# Patient Record
Sex: FEMALE | Race: WHITE | Employment: OTHER | URBAN - METROPOLITAN AREA
[De-identification: names, ages, dates, MRNs, and addresses within clinical notes are randomized per-mention and may not be internally consistent; named-entity substitution may affect disease eponyms.]

---

## 2017-01-18 ENCOUNTER — RADIANT APPOINTMENT (OUTPATIENT)
Dept: GENERAL RADIOLOGY | Facility: CLINIC | Age: 67
End: 2017-01-18
Attending: INTERNAL MEDICINE
Payer: COMMERCIAL

## 2017-01-18 ENCOUNTER — OFFICE VISIT (OUTPATIENT)
Dept: INTERNAL MEDICINE | Facility: CLINIC | Age: 67
End: 2017-01-18
Payer: COMMERCIAL

## 2017-01-18 VITALS
TEMPERATURE: 97 F | HEIGHT: 65 IN | HEART RATE: 90 BPM | SYSTOLIC BLOOD PRESSURE: 108 MMHG | OXYGEN SATURATION: 95 % | BODY MASS INDEX: 22.63 KG/M2 | DIASTOLIC BLOOD PRESSURE: 70 MMHG | WEIGHT: 135.8 LBS

## 2017-01-18 DIAGNOSIS — M25.531 RIGHT WRIST PAIN: ICD-10-CM

## 2017-01-18 DIAGNOSIS — D17.30 LIPOMA OF SKIN AND SUBCUTANEOUS TISSUE: Primary | ICD-10-CM

## 2017-01-18 DIAGNOSIS — M65.4 RADIAL STYLOID TENOSYNOVITIS: ICD-10-CM

## 2017-01-18 PROCEDURE — 99213 OFFICE O/P EST LOW 20 MIN: CPT | Performed by: INTERNAL MEDICINE

## 2017-01-18 PROCEDURE — 73110 X-RAY EXAM OF WRIST: CPT | Mod: RT

## 2017-01-18 NOTE — PROGRESS NOTES
SUBJECTIVE:                                                    Nita Trevizo is a 66 year old female who presents to clinic today for the following health issues:    Pt is a 66 year old female who is seen here to day with the complaints of having the pain in her right wrist on and off since  2 years  and the pain has increased in the last 2 months. Patient states that it has been difficult for her to lift things, open the jars with her right hand,  pt is left hand dominant,  Patient has had EMG in her right hand about 2 years ago and the results were inconclusive. Patient does complain of occasional tingling or numbness in the right fourth and fifth fingers.   Patient also complains of a lump in right wrist since a few years. No pain in the lump has not grown in size.      Patient Active Problem List   Diagnosis     Chronic maxillary sinusitis     Esophageal reflux     Osteopenia     CARDIOVASCULAR SCREENING; LDL GOAL LESS THAN 130     Dry eyes, bilateral     Past Surgical History   Procedure Laterality Date     C total abdom hysterectomy       for fibroids     Hc remove tonsils/adenoids,<12 y/o       C removal gallbladder       C ligate fallopian tube       C nonspecific procedure       vein stripping x 3     C appendectomy         Social History   Substance Use Topics     Smoking status: Never Smoker      Smokeless tobacco: Never Used     Alcohol Use: 0.0 oz/week     0 Standard drinks or equivalent per week     Family History   Problem Relation Age of Onset     DIABETES Mother      born 1928     Hypertension Mother      CANCER Paternal Grandfather      skin     Colon Cancer Maternal Grandfather          Current Outpatient Prescriptions   Medication Sig Dispense Refill     triamcinolone (KENALOG) 0.1 % cream Apply to affected area on neck bid for 7-10 days.Not to be used more then 10-14 consecutive days, not to be used on face or groin 30 g 0     polyethylene glycol 0.4%- propylene glycol 0.3% (SYSTANE ULTRA  "PF) 0.4-0.3 % SOLN ophthalmic solution Place 1 drop into both eyes 3 times daily as needed for dry eyes       estradiol 0.0375 MG/24HR PTWK Place 1 patch onto the skin once a week 12 patch 3     nitroglycerin (NITROSTAT) 0.4 MG SL tablet Place 1 tablet (0.4 mg) under the tongue every 5 minutes as needed for chest pain If you are still having symptoms after 3 doses (15 minutes) call 911. 25 tablet 0     NAPROXEN 500 MG OR TBEC 1 TABLET TWICE DAILY prn 30 3     IBUPROFEN 200 MG OR TABS 2 TABLET EVERY 4 TO 6 HOURS AS NEEDED for headaches          ROS:  C: NEGATIVE for fever, chills, change in weight  MUSCULOSKELETAL: rt wrist pain   NEURO: occ tingling /numbness Rt 4th/5th fingers.       OBJECTIVE:                                                    /70 mmHg  Pulse 90  Temp(Src) 97  F (36.1  C) (Oral)  Ht 5' 5\" (1.651 m)  Wt 135 lb 12.8 oz (61.598 kg)  BMI 22.60 kg/m2  SpO2 95%  Breastfeeding? No  Body mass index is 22.6 kg/(m^2).   GENERAL: healthy, alert, well nourished, well hydrated, no distress  MS:  Rt wrist- no swelling, no erythema, has tenderness at the base of rt thumb , also noted soft lump/lipoma on rt wrist on ulnar side.  NEURO: strength and tone- normal, sensory exam- grossly normal, mentation- intact, speech- normal, reflexes- symmetric, Tinel's and Phalen.s negative       ASSESSMENT/PLAN:                                                      (M65.4) Radial styloid tenosynovitis  Comment: explained about the condition  Plan:   DE QUERVAIN TENOSYNOVITIS-explained clearly about the condition.Apply ice,restrict thumb gripping and grasping.Try OTC Ibuprofen as directed,explined clearly about medication,insructions and side effects.will get x ray of the rt hand to r/o any arthritis. Also referred to hand specialist.. ORTHO  REFERRAL       (D17.30) Lipoma of skin and subcutaneous tissue    Plan: reassured, will be seeing hand specialist.         Andrae Burns MD  [place] " [facility]

## 2017-01-18 NOTE — NURSING NOTE
"Chief Complaint   Patient presents with     Wrist Pain     right wrist pain-had EMG done in past on that wrist       Initial /70 mmHg  Pulse 90  Temp(Src) 97  F (36.1  C) (Oral)  Ht 5' 5\" (1.651 m)  Wt 135 lb 12.8 oz (61.598 kg)  BMI 22.60 kg/m2  SpO2 95%  Breastfeeding? No Estimated body mass index is 22.6 kg/(m^2) as calculated from the following:    Height as of this encounter: 5' 5\" (1.651 m).    Weight as of this encounter: 135 lb 12.8 oz (61.598 kg).  BP completed using cuff size: regular  Toya Flynn CMA      "

## 2017-01-20 ENCOUNTER — OFFICE VISIT (OUTPATIENT)
Dept: ORTHOPEDICS | Facility: CLINIC | Age: 67
End: 2017-01-20
Payer: COMMERCIAL

## 2017-01-20 VITALS
WEIGHT: 135 LBS | DIASTOLIC BLOOD PRESSURE: 64 MMHG | SYSTOLIC BLOOD PRESSURE: 112 MMHG | HEIGHT: 65 IN | BODY MASS INDEX: 22.49 KG/M2

## 2017-01-20 DIAGNOSIS — M19.031 ARTHRITIS OF RIGHT WRIST: Primary | ICD-10-CM

## 2017-01-20 PROCEDURE — 99203 OFFICE O/P NEW LOW 30 MIN: CPT | Performed by: ORTHOPAEDIC SURGERY

## 2017-01-20 NOTE — NURSING NOTE
"Chief Complaint   Patient presents with     Wrist Pain     right wrist, radial styloid tenosynovitis       Initial /64 mmHg  Ht 5' 5\" (1.651 m)  Wt 135 lb (61.236 kg)  BMI 22.47 kg/m2 Estimated body mass index is 22.47 kg/(m^2) as calculated from the following:    Height as of this encounter: 5' 5\" (1.651 m).    Weight as of this encounter: 135 lb (61.236 kg).  BP completed using cuff size: small regular    "

## 2017-01-20 NOTE — MR AVS SNAPSHOT
After Visit Summary   1/20/2017    Nita Trevizo    MRN: [id number]           Patient Information     Date Of Birth          1950        Visit Information        Provider Department      1/20/2017 10:40 AM Rashid Toth MD [facility] ORTHOPEDIC SURGERY        Today's Diagnoses     Arthritis of right wrist    -  1        Follow-ups after your visit        Additional Services     ORTHO  REFERRAL       [facility] is referring you to the Orthopedic  Services at [facility].       The  Representative will assist you in the coordination of your Orthopedic and Musculoskeletal Care as prescribed by your physician.    The  Representative will call you within 1 business day to help schedule your appointment, or you may contact the  Representative at:    All areas ~ [phone]     Type of Referral : Non Surgical - Dr Sparks or Dr Zhong - US guided right distal radial ulnar joint injection for arthritis      Timeframe requested: Within 2 weeks    Coverage of these services is subject to the terms and limitations of your health insurance plan.  Please call member services at your health plan with any benefit or coverage questions.      If X-rays, CT or MRI's have been performed, please contact the facility where they were done to arrange for , prior to your scheduled appointment.  Please bring this referral request to your appointment and present it to your specialist.                  Who to contact     If you have questions or need follow up information about today's clinic visit or your schedule please contact [facility] ORTHOPEDIC SURGERY directly at [phone].  Normal or non-critical lab and imaging results will be communicated to you by MyChart, letter or phone within 4 business days after the clinic has received the results. If you do not hear from us within 7 days, please contact the  "clinic through [employer] or phone. If you have a critical or abnormal lab result, we will notify you by phone as soon as possible.  Submit refill requests through [employer] or call your pharmacy and they will forward the refill request to us. Please allow 3 business days for your refill to be completed.          Additional Information About Your Visit        [employer] Information     [employer] gives you secure access to your electronic health record. If you see a primary care provider, you can also send messages to your care team and make appointments. If you have questions, please call your primary care clinic.  If you do not have a primary care provider, please call [phone] and they will assist you.        Care EveryWhere ID     This is your Care EveryWhere ID. This could be used by other organizations to access your [place] medical records  [id number]        Your Vitals Were     Height BMI (Body Mass Index)                5' 5\" (1.651 m) 22.47 kg/m2           Blood Pressure from Last 3 Encounters:   01/20/17 112/64   01/18/17 108/70   10/12/16 90/50    Weight from Last 3 Encounters:   01/20/17 135 lb (61.236 kg)   01/18/17 135 lb 12.8 oz (61.598 kg)   10/12/16 137 lb (62.143 kg)              We Performed the Following     ORTHO  REFERRAL        Primary Care Provider Office Phone # Fax #    Evelynchevy SUSHMA Burns [phone]       [place] [address] [place] [address]        Thank you!     Thank you for choosing [facility] ORTHOPEDIC SURGERY  for your care. Our goal is always to provide you with excellent care. Hearing back from our patients is one way we can continue to improve our services. Please take a few minutes to complete the written survey that you may receive in the mail after your visit with us. Thank you!             Your Updated Medication List - Protect others around you: Learn how to safely use, store and throw away your medicines at " www.disposemymeds.org.          This list is accurate as of: 1/20/17 11:04 AM.  Always use your most recent med list.                   Brand Name Dispense Instructions for use    estradiol 0.0375 MG/24HR Ptwk     12 patch    Place 1 patch onto the skin once a week       ibuprofen 200 MG tablet    ADVIL/MOTRIN     2 TABLET EVERY 4 TO 6 HOURS AS NEEDED for headaches       naproxen 500 MG Tbec     30    1 TABLET TWICE DAILY prn       nitroglycerin 0.4 MG sublingual tablet    NITROSTAT    25 tablet    Place 1 tablet (0.4 mg) under the tongue every 5 minutes as needed for chest pain If you are still having symptoms after 3 doses (15 minutes) call 911.       SYSTANE ULTRA PF 0.4-0.3 % Soln ophthalmic solution   Generic drug:  polyethylene glycol 0.4%- propylene glycol 0.3%      Place 1 drop into both eyes 3 times daily as needed for dry eyes       triamcinolone 0.1 % cream    KENALOG    30 g    Apply to affected area on neck bid for 7-10 days.Not to be used more then 10-14 consecutive days, not to be used on face or groin

## 2017-01-20 NOTE — PROGRESS NOTES
HISTORY OF PRESENT ILLNESS:    Nita Trevizo is a 66 year old female who is seen in consultation at the request of Dr. Burns for right wrist pain.  Present symptoms: Pt states pain has been off and on for the last 2 years and has worsened over the last month.  Pt states pain is across the wrist, will get tingling in the 4th and 5th fingers, pain with turning door knobs and rotational movements cause pain. Pt states she will get pain through the palm of the hand, reports having a lipoma over the volar aspect of the wrist.  Treatments tried to this point: OTC Medication:  Ibuprofen (Advil)  Topical Treatments: Ice and wrist brace  Orthopedic PMH: no ortho surgeries    Past Medical History   Diagnosis Date     Disorder of bone and cartilage, unspecified      Esophageal reflux      Arthritis      Osteopenia 6/14     -1.5       Past Surgical History   Procedure Laterality Date     C total abdom hysterectomy       for fibroids     Hc remove tonsils/adenoids,<14 y/o       C removal gallbladder       C ligate fallopian tube       C nonspecific procedure       vein stripping x 3     C appendectomy         Family History   Problem Relation Age of Onset     DIABETES Mother      born 1928     Hypertension Mother      CANCER Paternal Grandfather      skin     Colon Cancer Maternal Grandfather        Social History     Social History     Marital Status:      Spouse Name: N/A     Number of Children: N/A     Years of Education: N/A     Occupational History     Not on file.     Social History Main Topics     Smoking status: Never Smoker      Smokeless tobacco: Never Used     Alcohol Use: 0.0 oz/week     0 Standard drinks or equivalent per week     Drug Use: No     Sexual Activity: No     Other Topics Concern     Not on file     Social History Narrative       Current Outpatient Prescriptions   Medication Sig Dispense Refill     triamcinolone (KENALOG) 0.1 % cream Apply to affected area on neck bid for 7-10 days.Not to be  "used more then 10-14 consecutive days, not to be used on face or groin 30 g 0     polyethylene glycol 0.4%- propylene glycol 0.3% (SYSTANE ULTRA PF) 0.4-0.3 % SOLN ophthalmic solution Place 1 drop into both eyes 3 times daily as needed for dry eyes       estradiol 0.0375 MG/24HR PTWK Place 1 patch onto the skin once a week 12 patch 3     nitroglycerin (NITROSTAT) 0.4 MG SL tablet Place 1 tablet (0.4 mg) under the tongue every 5 minutes as needed for chest pain If you are still having symptoms after 3 doses (15 minutes) call 911. 25 tablet 0     NAPROXEN 500 MG OR TBEC 1 TABLET TWICE DAILY prn 30 3     IBUPROFEN 200 MG OR TABS 2 TABLET EVERY 4 TO 6 HOURS AS NEEDED for headaches          Allergies   Allergen Reactions     Amides      Neosporin \"rash\"     Codeine      \"chest pain\"       REVIEW OF SYSTEMS:  CONSTITUTIONAL:  NEGATIVE for fever, chills, change in weight  INTEGUMENTARY/SKIN:  NEGATIVE for worrisome rashes, moles or lesions  EYES:  NEGATIVE for vision changes or irritation  ENT/MOUTH:  NEGATIVE for ear, mouth and throat problems  RESP:  NEGATIVE for significant cough or SOB  BREAST:  NEGATIVE for masses, tenderness or discharge  CV:  NEGATIVE for chest pain, palpitations or peripheral edema  GI:  NEGATIVE for nausea, abdominal pain, heartburn, or change in bowel habits  :  Negative   MUSCULOSKELETAL:  See HPI above  NEURO:  NEGATIVE for weakness, dizziness or paresthesias  ENDOCRINE:  NEGATIVE for temperature intolerance, skin/hair changes  HEME/ALLERGY/IMMUNE:  NEGATIVE for bleeding problems  PSYCHIATRIC:  NEGATIVE for changes in mood or affect      PHYSICAL EXAM:  /64 mmHg  Ht 5' 5\" (1.651 m)  Wt 135 lb (61.236 kg)  BMI 22.47 kg/m2  Body mass index is 22.47 kg/(m^2).   GENERAL APPEARANCE: healthy, alert and no distress   SKIN: no suspicious lesions or rashes  NEURO: Normal strength and tone, mentation intact and speech normal  VASCULAR: Good pulses, and capillary refill   LYMPH: no " lymphadenopathy   PSYCH:  mentation appears normal and affect normal/bright    MSK:  Distal radioulnar joint area tenderness and rotational pain. It is ligamentously stable however.     ASSESSMENT / PLAN: Distal radioulnar joint dysfunction. For both its diagnostic as well as therapeutic value I offered her an intra-articular corticosteroid injection.      Imaging Interpretation:         Manuel Toth MD  Department of Orthopedic Surgery        Disclaimer: This note consists of symbols derived from keyboarding, dictation and/or voice recognition software. As a result, there may be errors in the script that have gone undetected. Please consider this when interpreting information found in this chart.

## 2017-01-27 ENCOUNTER — OFFICE VISIT (OUTPATIENT)
Dept: ORTHOPEDICS | Facility: CLINIC | Age: 67
End: 2017-01-27
Payer: COMMERCIAL

## 2017-01-27 VITALS
WEIGHT: 135 LBS | DIASTOLIC BLOOD PRESSURE: 68 MMHG | SYSTOLIC BLOOD PRESSURE: 118 MMHG | HEIGHT: 65 IN | BODY MASS INDEX: 22.49 KG/M2

## 2017-01-27 DIAGNOSIS — M19.031 ARTHRITIS OF RIGHT WRIST: Primary | ICD-10-CM

## 2017-01-27 PROCEDURE — 20606 DRAIN/INJ JOINT/BURSA W/US: CPT | Mod: RT | Performed by: FAMILY MEDICINE

## 2017-01-27 RX ORDER — METHYLPREDNISOLONE ACETATE 40 MG/ML
40 INJECTION, SUSPENSION INTRA-ARTICULAR; INTRALESIONAL; INTRAMUSCULAR; SOFT TISSUE ONCE
Qty: 1 ML | Refills: 0 | OUTPATIENT
Start: 2017-01-27 | End: 2017-01-27

## 2017-01-27 NOTE — PROGRESS NOTES
ASSESSMENT & PLAN    ICD-10-CM    1. Arthritis of right wrist M19.90 HC ARTHROCENTESIS ASPIR&/INJ INTERM JT/BURS W/US     METHYLPREDNISOLONE 40 MG INJ     methylPREDNISolone acetate (DEPO-MEDROL) 40 MG/ML injection   Presents for DRUJ cortisone injection which was completed today.  No complications however given degree of arthritic changes she could only tolerate a total of 1 cc of the solution.    Follow up as directed by Dr. Toth. Call direct clinic number [phone] at any time with questions or concerns. Instructed to call the office if the condition evolves or worsens.    -----    SUBJECTIVE  Nita Trevizo is a/an 66 year old  left hand dominant female who is seen in consultation at the request of Dr. Toth for a DRUJ cortisone injection. The patient is seen by themselves.    Patient's past medical, surgical, social, and family histories were reviewed today and no changes are noted.    Right Distal Radioulnar Joint (DRUJ) Joint Corticosteroid Injection    Diagnoses (preoperative and postoperative): Right DRUJ osteoarthrosis    Current Procedure (include preoperative):  Sonographically guided right DRUJ corticosteroid injection    Current Indication (include preoperative):  Alleviation of pain    REFERRED BY: Dr. Toth    REASON FOR PROCEDURE: Dr. Toth has requested a sonographically guided right DRUJ corticosteroid injection to help with modulation of pain.     PATIENT EDUCATION: Ready to learn with no apparent learning barriers identified.  Learning preferences include listening. Explained diagnosis and treatment plan as well as treatment alternatives. Patient expressed understanding of the content.    Following denial of allergy and review of potential side effects and complications including but not necessarily limited to infection, bleeding, allergic reaction, post-injection flare, local tissue breakdown (including but not limited to potential for skin depigmentation and/or subcutaneous fat  atrophy), systemic effects of corticosteroids, elevation of blood glucose, injury to soft tissue and/or nerves and seizure, patient indicated their understanding and agreed to proceed. Written and signed consent was obtained and is scanned into the chart.    PROCEDURE: Prior to the procedure, the right wrist/hand region was examined with a hockey stick transducer to visualize the joint space and determine the approach for the procedure.    Procedure was carried out using sterile technique including ChloraPrep, a sterile transducer cover, and sterile transducer gel. A simple surgical tray was used.    PROCEDURAL PAUSE: Procedural pause conducted to verify correct patient identity, procedure to be performed, and as applicable, correct side/site, correct patient position, availability of implants, special equipment, or special requirements.     Patient position:  Seated    Transducer type:  Hockey-stick transducer    Approach:  Out of plane, distal to proximal    Local Anesthesia:  None    Aspirate:  None    Injectate: Sonographically guided 25-gauge, 1.5 inch needle was directly visualized entering down into the right DRUJ.  After confirming needle tip position a total volume of 1cc was injected of a solution containing 1 ml of 40 mg/ml Depo Medrol and 1 of 1% Lidocaine was injected and seen flowing into the DRUJ.    AFTERCARE:  Patient tolerated the procedure without complication. After a short observation period, patient was discharged under their own power and in excellent condition.     Patient noted to have 3/10 pain before completion of the procedure and 3/10 upon completion of the procedure.    Patient's conditions were thoroughly discussed during today's visit with greater than 50% of the visit spent counseling the patient with total time spent face-to-face with the patient being 20 minutes.    Arpan Zhong DO [facility] [place]

## 2017-01-27 NOTE — NURSING NOTE
"Chief Complaint   Patient presents with     Musculoskeletal Problem       Initial /68 mmHg  Ht 5' 5\" (1.651 m)  Wt 135 lb (61.236 kg)  BMI 22.47 kg/m2 Estimated body mass index is 22.47 kg/(m^2) as calculated from the following:    Height as of this encounter: 5' 5\" (1.651 m).    Weight as of this encounter: 135 lb (61.236 kg).  BP completed using cuff size: wm Paulino ATC    "

## 2017-01-27 NOTE — PATIENT INSTRUCTIONS
Thank you for allowing us to participate in your care today.  Please find below your visit diagnosis and the plan going forward.    1. Arthritis of right wrist      Steroid injection of the right wrist was performed today in clinic    - Would not soak in a hot tub, bath or swimming pool for 48 hours  - Ok to shower  - The lidocaine (what is giving you pain relief right now) will likely stop working in 1-2 hours.  You will then have pain again, similar to before you received the injection. The corticosteroid will not start working until approximately 1-2 weeks from now.  - Ice today and only do your normal amounts of activity    Follow up as needed (sooner if needed; call direct clinic number [[phone]] at any time with questions or concerns)    Arpan Zhong, DO [facility]

## 2017-02-02 ENCOUNTER — HOSPITAL ENCOUNTER (OUTPATIENT)
Dept: LAB | Facility: CLINIC | Age: 67
Discharge: HOME | End: 2017-02-02
Attending: ORTHOPAEDIC SURGERY | Admitting: ORTHOPAEDIC SURGERY
Payer: MEDICARE

## 2017-02-02 DIAGNOSIS — M25.539 WRIST PAIN: Primary | ICD-10-CM

## 2017-02-02 LAB
CRP SERPL-MCNC: <2.9 MG/L (ref 0–8)
ERYTHROCYTE [SEDIMENTATION RATE] IN BLOOD BY WESTERGREN METHOD: 10 MM/H (ref 0–30)

## 2017-02-02 PROCEDURE — 85652 RBC SED RATE AUTOMATED: CPT | Performed by: ORTHOPAEDIC SURGERY

## 2017-02-02 PROCEDURE — 86200 CCP ANTIBODY: CPT | Performed by: ORTHOPAEDIC SURGERY

## 2017-02-02 PROCEDURE — 36415 COLL VENOUS BLD VENIPUNCTURE: CPT | Performed by: ORTHOPAEDIC SURGERY

## 2017-02-02 PROCEDURE — 86140 C-REACTIVE PROTEIN: CPT | Performed by: ORTHOPAEDIC SURGERY

## 2017-02-02 PROCEDURE — 86038 ANTINUCLEAR ANTIBODIES: CPT | Performed by: ORTHOPAEDIC SURGERY

## 2017-02-02 PROCEDURE — 86431 RHEUMATOID FACTOR QUANT: CPT | Performed by: ORTHOPAEDIC SURGERY

## 2017-02-03 LAB
ANA SER QL IA: 2.4
CCP AB SER IA-ACNC: 1 U/ML
RHEUMATOID FACT SER NEPH-ACNC: <20 IU/ML (ref 0–20)

## 2017-02-28 ENCOUNTER — OFFICE VISIT (OUTPATIENT)
Dept: INTERNAL MEDICINE | Facility: CLINIC | Age: 67
End: 2017-02-28
Payer: COMMERCIAL

## 2017-02-28 VITALS
OXYGEN SATURATION: 99 % | RESPIRATION RATE: 16 BRPM | WEIGHT: 133.4 LBS | BODY MASS INDEX: 22.23 KG/M2 | HEIGHT: 65 IN | HEART RATE: 102 BPM | DIASTOLIC BLOOD PRESSURE: 66 MMHG | SYSTOLIC BLOOD PRESSURE: 108 MMHG | TEMPERATURE: 97.8 F

## 2017-02-28 DIAGNOSIS — Z01.818 PRE-OP EXAM: Primary | ICD-10-CM

## 2017-02-28 LAB
ANION GAP SERPL CALCULATED.3IONS-SCNC: 9 MMOL/L (ref 3–14)
BUN SERPL-MCNC: 14 MG/DL (ref 7–30)
CALCIUM SERPL-MCNC: 8.9 MG/DL (ref 8.5–10.1)
CHLORIDE SERPL-SCNC: 105 MMOL/L (ref 94–109)
CO2 SERPL-SCNC: 27 MMOL/L (ref 20–32)
CREAT SERPL-MCNC: 0.64 MG/DL (ref 0.52–1.04)
ERYTHROCYTE [DISTWIDTH] IN BLOOD BY AUTOMATED COUNT: 14.2 % (ref 10–15)
GFR SERPL CREATININE-BSD FRML MDRD: NORMAL ML/MIN/1.7M2
GLUCOSE SERPL-MCNC: 78 MG/DL (ref 70–99)
HCT VFR BLD AUTO: 46 % (ref 35–47)
HGB BLD-MCNC: 15 G/DL (ref 11.7–15.7)
MCH RBC QN AUTO: 28.6 PG (ref 26.5–33)
MCHC RBC AUTO-ENTMCNC: 32.6 G/DL (ref 31.5–36.5)
MCV RBC AUTO: 88 FL (ref 78–100)
PLATELET # BLD AUTO: 196 10E9/L (ref 150–450)
POTASSIUM SERPL-SCNC: 4 MMOL/L (ref 3.4–5.3)
RBC # BLD AUTO: 5.25 10E12/L (ref 3.8–5.2)
SODIUM SERPL-SCNC: 141 MMOL/L (ref 133–144)
WBC # BLD AUTO: 5.4 10E9/L (ref 4–11)

## 2017-02-28 PROCEDURE — 85027 COMPLETE CBC AUTOMATED: CPT | Performed by: NURSE PRACTITIONER

## 2017-02-28 PROCEDURE — 80048 BASIC METABOLIC PNL TOTAL CA: CPT | Performed by: NURSE PRACTITIONER

## 2017-02-28 PROCEDURE — 99214 OFFICE O/P EST MOD 30 MIN: CPT | Performed by: NURSE PRACTITIONER

## 2017-02-28 PROCEDURE — 93000 ELECTROCARDIOGRAM COMPLETE: CPT | Performed by: NURSE PRACTITIONER

## 2017-02-28 RX ORDER — CARBOXYMETHYLCELLULOSE SODIUM 5 MG/ML
1 SOLUTION/ DROPS OPHTHALMIC 3 TIMES DAILY PRN
COMMUNITY
End: 2017-08-10

## 2017-02-28 NOTE — NURSING NOTE
"Chief Complaint   Patient presents with     Pre-Op Exam     rt wrist on 03/07/17 at [facility]       Initial /66 (BP Location: Right arm, Patient Position: Chair, Cuff Size: Adult Regular)  Pulse 102  Temp 97.8  F (36.6  C) (Oral)  Resp 16  Ht 5' 5\" (1.651 m)  Wt 133 lb 6.4 oz (60.5 kg)  SpO2 99%  BMI 22.2 kg/m2 Estimated body mass index is 22.2 kg/(m^2) as calculated from the following:    Height as of this encounter: 5' 5\" (1.651 m).    Weight as of this encounter: 133 lb 6.4 oz (60.5 kg).  Medication Reconciliation: complete    "

## 2017-02-28 NOTE — MR AVS SNAPSHOT
After Visit Summary   2/28/2017    Nita Trevizo    MRN: [id number]           Patient Information     Date Of Birth          1950        Visit Information        Provider Department      2/28/2017 10:40 AM Deidre Lowry NP [facility]        Today's Diagnoses     Pre-op exam    -  1      Care Instructions      Before Your Surgery      Call your surgeon if there is any change in your health. This includes signs of a cold or flu (such as a sore throat, runny nose, cough, rash or fever).    Do not smoke, drink alcohol or take over the counter medicine (unless your surgeon or primary care doctor tells you to) for the 24 hours before and after surgery.    If you take prescribed drugs: Follow your doctor s orders about which medicines to take and which to stop until after surgery.    Eating and drinking prior to surgery: follow the instructions from your surgeon    Take a shower or bath the night before surgery. Use the soap your surgeon gave you to gently clean your skin. If you do not have soap from your surgeon, use your regular soap. Do not shave or scrub the surgery site.  Wear clean pajamas and have clean sheets on your bed.         Follow-ups after your visit        Who to contact     If you have questions or need follow up information about today's clinic visit or your schedule please contact [facility] directly at [phone].  Normal or non-critical lab and imaging results will be communicated to you by MyChart, letter or phone within 4 business days after the clinic has received the results. If you do not hear from us within 7 days, please contact the clinic through MyChart or phone. If you have a critical or abnormal lab result, we will notify you by phone as soon as possible.  Submit refill requests through [employer] or call your pharmacy and they will forward the refill request to us. Please allow 3 business days for your refill to be completed.  "         Additional Information About Your Visit        MyChart Information     [employer] gives you secure access to your electronic health record. If you see a primary care provider, you can also send messages to your care team and make appointments. If you have questions, please call your primary care clinic.  If you do not have a primary care provider, please call [phone] and they will assist you.        Care EveryWhere ID     This is your Care EveryWhere ID. This could be used by other organizations to access your [place] medical records  [id number]        Your Vitals Were     Pulse Temperature Respirations Height Pulse Oximetry BMI (Body Mass Index)    102 97.8  F (36.6  C) (Oral) 16 5' 5\" (1.651 m) 99% 22.2 kg/m2       Blood Pressure from Last 3 Encounters:   02/28/17 108/66   01/27/17 118/68   01/20/17 112/64    Weight from Last 3 Encounters:   02/28/17 133 lb 6.4 oz (60.5 kg)   01/27/17 135 lb (61.2 kg)   01/20/17 135 lb (61.2 kg)              We Performed the Following     Basic metabolic panel     Basic metabolic panel     CBC with platelets     CBC with platelets     EKG 12-lead complete w/read - Clinics        Primary Care Provider Office Phone # Fax #    Nasirknathanieli SUSHMA Burns [phone]       [place] [address] [place] [address]        Thank you!     Thank you for choosing [facility]  for your care. Our goal is always to provide you with excellent care. Hearing back from our patients is one way we can continue to improve our services. Please take a few minutes to complete the written survey that you may receive in the mail after your visit with us. Thank you!             Your Updated Medication List - Protect others around you: Learn how to safely use, store and throw away your medicines at www.disposemymeds.org.          This list is accurate as of: 2/28/17 11:02 AM.  Always use your most recent med list.                   Brand " Name Dispense Instructions for use    carboxymethylcellulose 0.5 % Soln ophthalmic solution    REFRESH PLUS     Place 1 drop into both eyes 3 times daily as needed for dry eyes       estradiol 0.0375 MG/24HR Ptwk     12 patch    Place 1 patch onto the skin once a week       ibuprofen 200 MG tablet    ADVIL/MOTRIN     2 TABLET EVERY 4 TO 6 HOURS AS NEEDED for headaches       naproxen 500 MG Tbec     30    1 TABLET TWICE DAILY prn       nitroglycerin 0.4 MG sublingual tablet    NITROSTAT    25 tablet    Place 1 tablet (0.4 mg) under the tongue every 5 minutes as needed for chest pain If you are still having symptoms after 3 doses (15 minutes) call 911.       SYSTANE ULTRA PF 0.4-0.3 % Soln ophthalmic solution   Generic drug:  polyethylene glycol 0.4%- propylene glycol 0.3%      Place 1 drop into both eyes 3 times daily as needed for dry eyes Reported on 2/28/2017       triamcinolone 0.1 % cream    KENALOG    30 g    Apply to affected area on neck bid for 7-10 days.Not to be used more then 10-14 consecutive days, not to be used on face or groin

## 2017-02-28 NOTE — PROGRESS NOTES
[place] [address]  [facility] [address]  [phone]  Dept: [phone]    PRE-OP EVALUATION:  Today's date: 2017    Nita Trevizo (: 1950) presents for pre-operative evaluation assessment as requested by Dr. Stevens.  She requires evaluation and anesthesia risk assessment prior to undergoing surgery/procedure for treatment of Right wrist bone spurs .  Proposed procedure: Right wrist surgery    Date of Surgery/ Procedure:   Time of Surgery/ Procedure: 1:30 pm  Hospital/Surgical Facility: [facility]  Fax number for surgical facility: [phone]  Primary Physician: Andrae Burns  Type of Anesthesia Anticipated: General    Patient has a Health Care Directive or Living Will:  NO    1. NO - Do you have a history of heart attack, stroke, stent, bypass or surgery on an artery in the head, neck, heart or legs?  2. NO - Do you ever have any pain or discomfort in your chest?  3. NO - Do you have a history of  Heart Failure?  4. NO - Are you troubled by shortness of breath when: walking on the level, up a slight hill or at night?  5. NO - Do you currently have a cold, bronchitis or other respiratory infection?  6. NO - Do you have a cough, shortness of breath or wheezing?  7. NO - Do you sometimes get pains in the calves of your legs when you walk?  8. NO - Do you or anyone in your family have previous history of blood clots?  9. NO - Do you or does anyone in your family have a serious bleeding problem such as prolonged bleeding following surgeries or cuts?  10. NO - Have you ever had problems with anemia or been told to take iron pills?  11. NO - Have you had any abnormal blood loss such as black, tarry or bloody stools, or abnormal vaginal bleeding?  12. NO - Have you ever had a blood transfusion?  13. YES - Have you or any of your relatives ever had problems with anesthesia?  14. NO - Do you have sleep apnea, excessive snoring  or daytime drowsiness?  15. NO - Do you have any prosthetic heart valves?  16. NO - Do you have prosthetic joints?  17. NO - Is there any chance that you may be pregnant?      HPI:                                                      Brief HPI related to upcoming procedure: R wrist bone spurs      See problem list for active medical problems.  Problems all longstanding and stable, except as noted/documented.  See ROS for pertinent symptoms related to these conditions.                                                                                                  .    MEDICAL HISTORY:                                                      Patient Active Problem List    Diagnosis Date Noted     Dry eyes, bilateral 10/12/2016     Priority: Medium     Osteopenia 08/01/2016     Priority: Medium     CARDIOVASCULAR SCREENING; LDL GOAL LESS THAN 130 08/01/2016     Priority: Medium     Esophageal reflux 06/27/2006     Priority: Medium     Chronic maxillary sinusitis 08/03/2004     Priority: Medium      Past Medical History   Diagnosis Date     Arthritis      Disorder of bone and cartilage, unspecified      Esophageal reflux      Osteopenia 6/14     -1.5     Past Surgical History   Procedure Laterality Date     C total abdom hysterectomy       for fibroids     Hc remove tonsils/adenoids,<12 y/o       C removal gallbladder       C ligate fallopian tube       C nonspecific procedure       vein stripping x 3     C appendectomy       Current Outpatient Prescriptions   Medication Sig Dispense Refill     carboxymethylcellulose (REFRESH PLUS) 0.5 % SOLN ophthalmic solution Place 1 drop into both eyes 3 times daily as needed for dry eyes       estradiol 0.0375 MG/24HR PTWK Place 1 patch onto the skin once a week 12 patch 3     nitroglycerin (NITROSTAT) 0.4 MG SL tablet Place 1 tablet (0.4 mg) under the tongue every 5 minutes as needed for chest pain If you are still having symptoms after 3 doses (15 minutes) call 911. 25 tablet 0      "NAPROXEN 500 MG OR TBEC 1 TABLET TWICE DAILY prn 30 3     IBUPROFEN 200 MG OR TABS 2 TABLET EVERY 4 TO 6 HOURS AS NEEDED for headaches        triamcinolone (KENALOG) 0.1 % cream Apply to affected area on neck bid for 7-10 days.Not to be used more then 10-14 consecutive days, not to be used on face or groin (Patient not taking: Reported on 2/28/2017) 30 g 0     polyethylene glycol 0.4%- propylene glycol 0.3% (SYSTANE ULTRA PF) 0.4-0.3 % SOLN ophthalmic solution Place 1 drop into both eyes 3 times daily as needed for dry eyes Reported on 2/28/2017       OTC products: None, except as noted above    Allergies   Allergen Reactions     Amides      Neosporin \"rash\"     Codeine      \"chest pain\"      Latex Allergy: NO    Social History   Substance Use Topics     Smoking status: Never Smoker     Smokeless tobacco: Never Used     Alcohol use 0.0 oz/week     0 Standard drinks or equivalent per week     History   Drug Use No       REVIEW OF SYSTEMS:                                                    C: NEGATIVE for fever, chills, change in weight  E: NEGATIVE for vision changes or irritation  E/M: NEGATIVE for ear, mouth and throat problems  R: NEGATIVE for significant cough or SOB  CV: NEGATIVE for chest pain, palpitations or peripheral edema  GI: NEGATIVE for nausea, abdominal pain, heartburn, or change in bowel habits  : NEGATIVE for frequency, dysuria, or hematuria  M: NEGATIVE for significant arthralgias or myalgia  E: NEGATIVE for temperature intolerance, skin/hair changes  H: NEGATIVE for bleeding problems  P: NEGATIVE for changes in mood or affect    EXAM:                                                    /66 (BP Location: Right arm, Patient Position: Chair, Cuff Size: Adult Regular)  Pulse 102  Temp 97.8  F (36.6  C) (Oral)  Resp 16  Ht 5' 5\" (1.651 m)  Wt 133 lb 6.4 oz (60.5 kg)  SpO2 99%  BMI 22.2 kg/m2    GENERAL APPEARANCE: healthy, alert and no distress     NECK: no adenopathy, no asymmetry, masses, or " scars and thyroid normal to palpation     RESP: lungs clear to auscultation - no rales, rhonchi or wheezes     CV: regular rates and rhythm, normal S1 S2, no S3 or S4 and no murmur, click or rub     ABDOMEN:  soft, nontender, no HSM or masses and bowel sounds normal     NEURO: Normal strength and tone, sensory exam grossly normal, mentation intact and speech normal     PSYCH: mentation appears normal. and affect normal/bright    DIAGNOSTICS:                                                    EKG: appears normal, NSR    Recent Labs   Lab Test  08/01/16   0902  05/30/09   0827   HGB   --   14.1   NA  140  140   POTASSIUM  3.9  5.3   CR  0.63  0.65        IMPRESSION:                                                    Reason for surgery/procedure: R wrist bone spurs    The proposed surgical procedure is considered LOW risk.    REVISED CARDIAC RISK INDEX  The patient has the following serious cardiovascular risks for perioperative complications such as (MI, PE, VFib and 3  AV Block):  No serious cardiac risks  INTERPRETATION: 0 risks: Class I (very low risk - 0.4% complication rate)    The patient has the following additional risks for perioperative complications:  No identified additional risks      ICD-10-CM    1. Pre-op exam Z01.818 EKG 12-lead complete w/read - Clinics       RECOMMENDATIONS:                                                      --Consult hospital rounder / IM to assist post-op medical management        APPROVAL GIVEN to proceed with proposed procedure, without further diagnostic evaluation       Signed Electronically by: Deidre Lowry NP    Copy of this evaluation report is provided to requesting physician.    [facility] Preop Guidelines

## 2017-07-07 ENCOUNTER — TELEPHONE (OUTPATIENT)
Dept: INTERNAL MEDICINE | Facility: CLINIC | Age: 67
End: 2017-07-07

## 2017-07-07 DIAGNOSIS — Z78.0 ASYMPTOMATIC MENOPAUSAL STATE: Primary | ICD-10-CM

## 2017-07-07 NOTE — TELEPHONE ENCOUNTER
Patient calling requesting Dexa scan, FLP, and any other lab necessary for physical with Dr. Henriquez 8/10/17. Patient would like all labs done that day as pt is coming from [place], [place]. Patient will come fasting.   FLP and Dexa pended.  Provider please review and advise.

## 2017-07-07 NOTE — TELEPHONE ENCOUNTER
We don't put in lab orders now; I order them when I see her and she goes to labs after the appointment. Not sure if there are openings for DXA that day but order has been put in.

## 2017-08-10 ENCOUNTER — OFFICE VISIT (OUTPATIENT)
Dept: INTERNAL MEDICINE | Facility: CLINIC | Age: 67
End: 2017-08-10
Payer: COMMERCIAL

## 2017-08-10 ENCOUNTER — RADIANT APPOINTMENT (OUTPATIENT)
Dept: BONE DENSITY | Facility: CLINIC | Age: 67
End: 2017-08-10
Attending: INTERNAL MEDICINE
Payer: COMMERCIAL

## 2017-08-10 ENCOUNTER — HOSPITAL ENCOUNTER (OUTPATIENT)
Dept: MAMMOGRAPHY | Facility: CLINIC | Age: 67
Discharge: HOME | End: 2017-08-10
Attending: INTERNAL MEDICINE | Admitting: INTERNAL MEDICINE
Payer: MEDICARE

## 2017-08-10 VITALS
SYSTOLIC BLOOD PRESSURE: 88 MMHG | HEIGHT: 65 IN | DIASTOLIC BLOOD PRESSURE: 62 MMHG | HEART RATE: 91 BPM | BODY MASS INDEX: 23.32 KG/M2 | WEIGHT: 140 LBS | RESPIRATION RATE: 16 BRPM | TEMPERATURE: 97.7 F | OXYGEN SATURATION: 96 %

## 2017-08-10 DIAGNOSIS — N95.1 SYMPTOMATIC MENOPAUSAL OR FEMALE CLIMACTERIC STATES: ICD-10-CM

## 2017-08-10 DIAGNOSIS — Z00.00 ENCOUNTER FOR ROUTINE ADULT HEALTH EXAMINATION WITHOUT ABNORMAL FINDINGS: Primary | ICD-10-CM

## 2017-08-10 DIAGNOSIS — Z78.0 ASYMPTOMATIC MENOPAUSAL STATE: ICD-10-CM

## 2017-08-10 DIAGNOSIS — Z12.31 VISIT FOR SCREENING MAMMOGRAM: ICD-10-CM

## 2017-08-10 DIAGNOSIS — L98.9 SKIN LESION: ICD-10-CM

## 2017-08-10 DIAGNOSIS — H61.22 IMPACTED CERUMEN OF LEFT EAR: ICD-10-CM

## 2017-08-10 DIAGNOSIS — Z00.00 MEDICARE WELCOME VISIT: ICD-10-CM

## 2017-08-10 LAB
ANION GAP SERPL CALCULATED.3IONS-SCNC: 6 MMOL/L (ref 3–14)
BUN SERPL-MCNC: 18 MG/DL (ref 7–30)
CALCIUM SERPL-MCNC: 9.1 MG/DL (ref 8.5–10.1)
CHLORIDE SERPL-SCNC: 107 MMOL/L (ref 94–109)
CHOLEST SERPL-MCNC: 216 MG/DL
CO2 SERPL-SCNC: 29 MMOL/L (ref 20–32)
CREAT SERPL-MCNC: 0.65 MG/DL (ref 0.52–1.04)
GFR SERPL CREATININE-BSD FRML MDRD: NORMAL ML/MIN/1.7M2
GLUCOSE SERPL-MCNC: 87 MG/DL (ref 70–99)
HCV AB SERPL QL IA: NORMAL
HDLC SERPL-MCNC: 78 MG/DL
LDLC SERPL CALC-MCNC: 122 MG/DL
NONHDLC SERPL-MCNC: 138 MG/DL
POTASSIUM SERPL-SCNC: 4.5 MMOL/L (ref 3.4–5.3)
SODIUM SERPL-SCNC: 142 MMOL/L (ref 133–144)
TRIGL SERPL-MCNC: 81 MG/DL

## 2017-08-10 PROCEDURE — 80048 BASIC METABOLIC PNL TOTAL CA: CPT | Performed by: INTERNAL MEDICINE

## 2017-08-10 PROCEDURE — 77080 DXA BONE DENSITY AXIAL: CPT | Performed by: INTERNAL MEDICINE

## 2017-08-10 PROCEDURE — 77063 BREAST TOMOSYNTHESIS BI: CPT

## 2017-08-10 PROCEDURE — G0402 INITIAL PREVENTIVE EXAM: HCPCS | Performed by: INTERNAL MEDICINE

## 2017-08-10 PROCEDURE — 80061 LIPID PANEL: CPT | Performed by: INTERNAL MEDICINE

## 2017-08-10 PROCEDURE — G0403 EKG FOR INITIAL PREVENT EXAM: HCPCS | Performed by: INTERNAL MEDICINE

## 2017-08-10 PROCEDURE — 36415 COLL VENOUS BLD VENIPUNCTURE: CPT | Performed by: INTERNAL MEDICINE

## 2017-08-10 PROCEDURE — 69210 REMOVE IMPACTED EAR WAX UNI: CPT | Performed by: INTERNAL MEDICINE

## 2017-08-10 PROCEDURE — 86803 HEPATITIS C AB TEST: CPT | Performed by: INTERNAL MEDICINE

## 2017-08-10 PROCEDURE — G0202 SCR MAMMO BI INCL CAD: HCPCS

## 2017-08-10 RX ORDER — ESTRADIOL 0.04 MG/D
1 PATCH TRANSDERMAL WEEKLY
Qty: 12 PATCH | Refills: 3 | Status: SHIPPED | OUTPATIENT
Start: 2017-08-10 | End: 2018-07-19

## 2017-08-10 NOTE — NURSING NOTE
"Chief Complaint   Patient presents with     Medicare Visit     Welcome to Medicare: Fasting       Initial BP (!) 88/62  Pulse 91  Temp 97.7  F (36.5  C) (Oral)  Resp 16  Ht 5' 5\" (1.651 m)  Wt 140 lb (63.5 kg)  SpO2 96%  BMI 23.3 kg/m2 Estimated body mass index is 23.3 kg/(m^2) as calculated from the following:    Height as of this encounter: 5' 5\" (1.651 m).    Weight as of this encounter: 140 lb (63.5 kg).  Medication Reconciliation: complete      Gino Leon, CHALINO    Pt decline Welcome to Medicare physical. Mammogram schedule for today       "

## 2017-08-10 NOTE — MR AVS SNAPSHOT
After Visit Summary   8/10/2017    Nita Trevizo    MRN: [id number]           Patient Information     Date Of Birth          1950        Visit Information        Provider Department      8/10/2017 7:40 AM Jerilyn Henriquez MD [facility]        Today's Diagnoses     Encounter for routine adult health examination without abnormal findings    -  1    Medicare welcome visit        Symptomatic menopausal or female climacteric states        Skin lesion        Impacted cerumen of left ear          Care Instructions      PREVENTIVE HEALTH RECOMMENDATIONS:     Vaccines: Get a flu shot each year. Get a tetanus shot every 10 years.     Exercise for at least 150 minutes a week (an average of 30 minutes a day, 5 days of the week). This will help you control your weight and prevent disease.    Limit alcohol to one drink per day.    No smoking.     Wear sunscreen to prevent skin cancer.     See your dentist twice a year for an exam and cleaning.    Try to get Calcium 1200 mg total per day. It is best to not take it all at once. Try to get Vitamin D at least 1000 units per day.    BMI or Body Mass Index is a way of indicating weight and health risk for cardiovascular diseases, high blood pressure, diabetes.   Definitions:    Underweight is less than 18.5 and will be associated with health risk.   Normal BMI is 18.5 to 25   Overweight is 25-29   Obesity is 30 or greater   Morbid Obesity is 40 or greater or 35 or greater with diabetes or high blood pressure  Obesity and Morbid Obesity are associated with higher health risks. Lowering calories, exercising more may lower your BMI and even small decreases can have positive impact on lowering health risks.   Your Body mass index is 23.3 kg/(m^2)..             Follow-ups after your visit        Additional Services     DERMATOLOGY REFERRAL       Your provider has referred you to: [facility]: [facility] [phone]    [email or web address]  [facility]: [facility] [place] [phone]  [facility]: [facility] [place] [phone]   [email or web address]  [facility] [phone]   [email or web address]    Please be aware that coverage of these services is subject to the terms and limitations of your health insurance plan.  Call member services at your health plan with any benefit or coverage questions.      Please bring the following with you to your appointment:    (1) Any X-Rays, CTs or MRIs which have been performed.  Contact the facility where they were done to arrange for  prior to your scheduled appointment.    (2) List of current medications  (3) This referral request   (4) Any documents/labs given to you for this referral                  Your next 10 appointments already scheduled     Aug 10, 2017 10:05 AM CDT   MA SCREENING DIGITAL BILATERAL with [id number]   [facility] ([facility])    [address], [address]  [facility] [address] [phone]           Do not use any powder, lotion or deodorant under your arms or on your breast. If you do, we will ask you to remove it before your exam.  Wear comfortable, two-piece clothing.  If you have any allergies, tell your care team.  Bring any previous mammograms from other facilities or have them mailed to the breast center. Three-dimensional (3D) mammograms are available at [facility] locations in [facility], and [place]. [facility] locations include [facility] in [place]. Benefits of 3D mammograms include: - Improved rate of cancer detection - Decreases your chance of having to go back for more tests, which means fewer: - \"False-positive\" results (This means that there is an abnormal area but it isn't cancer.) - Invasive testing " procedures, such as a biopsy or surgery - Can provide clearer images of the breast if you have dense breast tissue. 3D mammography is an optional exam that anyone can have with a 2D mammogram. It doesn't replace or take the place of a 2D mammogram. 2D mammograms remain an effective screening test for all women.  Not all insurance companies cover the cost of a 3D mammogram. Check with your insurance.            Aug 10, 2017 11:00 AM CDT   DX HIP/PELVIS/SPINE with RIDX1   [facility]    [address]  [address]  [facility] [address]              Please do not take any of the following 24 hours prior to the day of your exam: vitamins, calcium tablets, antacids.  If possible, please wear clothes without metal (snaps, zippers). A sweatsuit works well.              Future tests that were ordered for you today     Open Future Orders        Priority Expected Expires Ordered    MA Screening Digital Bilateral Routine  8/9/2018 8/9/2017            Who to contact     If you have questions or need follow up information about today's clinic visit or your schedule please contact [facility] directly at [phone].  Normal or non-critical lab and imaging results will be communicated to you by MyChart, letter or phone within 4 business days after the clinic has received the results. If you do not hear from us within 7 days, please contact the clinic through [employer] or phone. If you have a critical or abnormal lab result, we will notify you by phone as soon as possible.  Submit refill requests through [employer] or call your pharmacy and they will forward the refill request to us. Please allow 3 business days for your refill to be completed.          Additional Information About Your Visit        C[employer] Information     [employer] gives you secure access to your electronic health record. If you see a primary care provider, you can also send messages to your  "care team and make appointments. If you have questions, please call your primary care clinic.  If you do not have a primary care provider, please call [phone] and they will assist you.        Care EveryWhere ID     This is your Care EveryWhere ID. This could be used by other organizations to access your [place] medical records  [id number]        Your Vitals Were     Pulse Temperature Respirations Height Pulse Oximetry BMI (Body Mass Index)    91 97.7  F (36.5  C) (Oral) 16 5' 5\" (1.651 m) 96% 23.3 kg/m2       Blood Pressure from Last 3 Encounters:   08/10/17 (!) 88/62   02/28/17 108/66   01/27/17 118/68    Weight from Last 3 Encounters:   08/10/17 140 lb (63.5 kg)   02/28/17 133 lb 6.4 oz (60.5 kg)   01/27/17 135 lb (61.2 kg)              We Performed the Following     Basic metabolic panel     DERMATOLOGY REFERRAL     EKG 12-lead complete w/read - Clinics     Hepatitis C Screen Reflex to HCV RNA Quant and Genotype     Lipid panel reflex to direct LDL     REMOVE IMPACTED CERUMEN          Today's Medication Changes          These changes are accurate as of: 8/10/17  8:19 AM.  If you have any questions, ask your nurse or doctor.               Stop taking these medicines if you haven't already. Please contact your care team if you have questions.     carboxymethylcellulose 0.5 % Soln ophthalmic solution   Commonly known as:  REFRESH PLUS   Stopped by:  Jerilyn Henriquez MD                Where to get your medicines      These medications were sent to [facility] [address] [place] [address] - [address]  [address] [facility] [address]     Phone:  [phone]     estradiol 0.0375 MG/24HR Ptwk                Primary Care Provider Office Phone # Fax #    Jerilyn Henriquez [phone]       [address]   [facility] [address]        Equal Access to Services     HELENA GRACE AH: Hadii ruben romano hadasho Soomaali, waaxda luqadaha, qaybta kaalmada adeashleyyaana rosa, sandra perez " layony ge. So [facility] [phone].    ATENCIÓN: Si elidala emilie, tiene a gomes disposición servicios gratuitos de asistencia lingüística. Fariha sagastume [phone].    We comply with applicable federal civil rights laws and [place] laws. We do not discriminate on the basis of race, color, national origin, age, disability sex, sexual orientation or gender identity.            Thank you!     Thank you for choosing [facility]  for your care. Our goal is always to provide you with excellent care. Hearing back from our patients is one way we can continue to improve our services. Please take a few minutes to complete the written survey that you may receive in the mail after your visit with us. Thank you!             Your Updated Medication List - Protect others around you: Learn how to safely use, store and throw away your medicines at www.disposemymeds.org.          This list is accurate as of: 8/10/17  8:19 AM.  Always use your most recent med list.                   Brand Name Dispense Instructions for use Diagnosis    estradiol 0.0375 MG/24HR Ptwk     12 patch    Place 1 patch onto the skin once a week    Symptomatic menopausal or female climacteric states       ibuprofen 200 MG tablet    ADVIL/MOTRIN     2 TABLET EVERY 4 TO 6 HOURS AS NEEDED for headaches        naproxen 500 MG Tbec     30    1 TABLET TWICE DAILY prn    Sciatica       nitroGLYcerin 0.4 MG sublingual tablet    NITROSTAT    25 tablet    Place 1 tablet (0.4 mg) under the tongue every 5 minutes as needed for chest pain If you are still having symptoms after 3 doses (15 minutes) call 911.        SYSTANE ULTRA PF 0.4-0.3 % Soln ophthalmic solution   Generic drug:  polyethylene glycol 0.4%- propylene glycol 0.3%      Place 1 drop into both eyes 3 times daily as needed for dry eyes Reported on 2/28/2017    Dry eyes, bilateral

## 2017-08-10 NOTE — PATIENT INSTRUCTIONS
PREVENTIVE HEALTH RECOMMENDATIONS:     Vaccines: Get a flu shot each year. Get a tetanus shot every 10 years.     Exercise for at least 150 minutes a week (an average of 30 minutes a day, 5 days of the week). This will help you control your weight and prevent disease.    Limit alcohol to one drink per day.    No smoking.     Wear sunscreen to prevent skin cancer.     See your dentist twice a year for an exam and cleaning.    Try to get Calcium 1200 mg total per day. It is best to not take it all at once. Try to get Vitamin D at least 1000 units per day.    BMI or Body Mass Index is a way of indicating weight and health risk for cardiovascular diseases, high blood pressure, diabetes.   Definitions:    Underweight is less than 18.5 and will be associated with health risk.   Normal BMI is 18.5 to 25   Overweight is 25-29   Obesity is 30 or greater   Morbid Obesity is 40 or greater or 35 or greater with diabetes or high blood pressure  Obesity and Morbid Obesity are associated with higher health risks. Lowering calories, exercising more may lower your BMI and even small decreases can have positive impact on lowering health risks.   Your Body mass index is 23.3 kg/(m^2)..

## 2017-08-10 NOTE — PROGRESS NOTES
SUBJECTIVE:   Nita Trevizo is a 66 year old female who presents for Preventive Visit.    Are you in the first 12 months of your Medicare Part B coverage?  Yes,  Visual Acuity:  Right Eye: 25/20   Left Eye: 25/20  Both Eyes: 20/20    Healthy Habits:    Do you get at least three servings of calcium containing foods daily (dairy, green leafy vegetables, etc.)? yes    Amount of exercise or daily activities, outside of work: yes    Problems taking medications regularly No    Medication side effects: No    Have you had an eye exam in the past two years? yes    Do you see a dentist twice per year? yes    Do you have sleep apnea, excessive snoring or daytime drowsiness?yes      COGNITIVE SCREEN  1) Repeat 3 items (Banana, Sunrise, Chair)    2) Clock draw: NORMAL  3) 3 item recall: Recalls 3 objects  Results: 3 items recalled: COGNITIVE IMPAIRMENT LESS LIKELY    Mini-CogTM Copyright S Mark. Licensed by the author for use in [facility]; reprinted with permission ([email or web address]). All rights reserved.        Current concerns:   1. She has some chronic postnasal drainage, nasal congestion. Claritin did not help. Ears plugged.     Reviewed and updated as needed this visit by clinical staff         Reviewed and updated as needed this visit by Provider      Social History   Substance Use Topics     Smoking status: Never Smoker     Smokeless tobacco: Never Used     Alcohol use 0.0 oz/week     0 Standard drinks or equivalent per week       The patient does not drink >3 drinks per day nor >7 drinks per week.    Today's PHQ-2 Score:   PHQ-2 ( 1999 Pfizer) 8/1/2016   Q1: Little interest or pleasure in doing things 0   Q2: Feeling down, depressed or hopeless 0   PHQ-2 Score 0         Do you feel safe in your environment - Yes    Do you have a Health Care Directive?: No: Advance care planning reviewed with patient; information given to patient to review.      Current providers sharing in care for this patient include:  Patient Care Team:  Andrae Burns MD as PCP - General (Internal Medicine)      Hearing impairment: No    Ability to successfully perform activities of daily living: Yes,  assistance needed due to wrist surgery. Trouble lifting anything heavy.    Fall risk:  Fallen 2 or more times in the past year?: No  Any fall with injury in the past year?: No      Home safety:  none identified      The following health maintenance items are reviewed in Epic and correct as of today:Health Maintenance   Topic Date Due     HEPATITIS C SCREENING  08/16/1969     ADVANCE DIRECTIVE PLANNING Q5 YRS  08/16/2006     MAMMO Q1 YR  08/01/2017     INFLUENZA VACCINE (SYSTEM ASSIGNED)  09/01/2017     PNEUMOCOCCAL (2 of 2 - PPSV23) 09/01/2017     FALL RISK ASSESSMENT  01/18/2018     COLONOSCOPY Q10 YR  08/15/2020     LIPID SCREEN Q5 YR FEMALE (SYSTEM ASSIGNED)  08/01/2021     TETANUS Q10 YR  07/05/2026     DEXA SCAN SCREENING (SYSTEM ASSIGNED)  Completed     Patient Active Problem List   Diagnosis     Chronic maxillary sinusitis     Esophageal reflux     Osteopenia     CARDIOVASCULAR SCREENING; LDL GOAL LESS THAN 130     Dry eyes, bilateral     Current Outpatient Prescriptions   Medication Sig Dispense Refill     polyethylene glycol 0.4%- propylene glycol 0.3% (SYSTANE ULTRA PF) 0.4-0.3 % SOLN ophthalmic solution Place 1 drop into both eyes 3 times daily as needed for dry eyes Reported on 2/28/2017       estradiol 0.0375 MG/24HR PTWK Place 1 patch onto the skin once a week 12 patch 3     nitroglycerin (NITROSTAT) 0.4 MG SL tablet Place 1 tablet (0.4 mg) under the tongue every 5 minutes as needed for chest pain If you are still having symptoms after 3 doses (15 minutes) call 911. 25 tablet 0     NAPROXEN 500 MG OR TBEC 1 TABLET TWICE DAILY prn 30 3     IBUPROFEN 200 MG OR TABS 2 TABLET EVERY 4 TO 6 HOURS AS NEEDED for headaches         Review Of Systems  Skin: back of neck  Eyes: negative  Ears/Nose/Throat: as above  Respiratory:  "Cough- related postnasal drainage  and No dyspnea on exertion  Cardiovascular: palpitations stable  Gastrointestinal: constipation; has IBS and dumping  Genitourinary: negative  Musculoskeletal: negative  Neurologic: negative  Psychiatric: negative  Hematologic/Lymphatic/Immunologic: negative  Endocrine: negative   Gynecologic ros reveals:no breast pain or new or enlarging lumps on self exam    Objective:  Patient alert, in no acute distress  BP (!) 88/62  Pulse 91  Temp 97.7  F (36.5  C) (Oral)  Resp 16  Ht 5' 5\" (1.651 m)  Wt 140 lb (63.5 kg)  SpO2 96%  BMI 23.3 kg/m2    HEENT: extraocular movements are intact, pupils equal and reactive to light and accommodation, oropharynx clear, right TM clear, left obscured by wax, moderate amount removed manually with curette, the rest flushed with normal TM. Nasal mucosa moderately edematous  NECK: Neck supple. No adenopathy. Thyroid symmetric, normal size,, Carotids without bruits.  PULMONARY: clear to auscultation  CARDIAC: regular rate and rhythm and no murmurs, clicks, or gallops  PULSES: 2/2 throughout  BACK: no spinal or CVAT  ABDOMINAL: Soft, nontender.  Normal bowel sounds.  No hepatosplenomegaly or abnormal masses  BREAST: No breast masses or tenderness, No axillary masses or tenderness and No galactorrhea  PELVIC: not needed  REFLEXES: 2+ throughout  SKIN: small raised papule on left upper back with small eschar, irregular, seb ks on back      EKG: NSR, overall normal    ASSESSMENT / PLAN:   1. Encounter for routine adult health examination without abnormal findings    - Hepatitis C Screen Reflex to HCV RNA Quant and Genotype    2. Medicare welcome visit    - Basic metabolic panel  - Lipid panel reflex to direct LDL  - EKG 12-lead complete w/read - Clinics    3. Symptomatic menopausal or female climacteric states  Stable, refill med  - estradiol 0.0375 MG/24HR PTWK; Place 1 patch onto the skin once a week  Dispense: 12 patch; Refill: 3    4. Skin lesion  May " "be benign but hard to tell with eshcar, recommend derm  - DERMATOLOGY REFERRAL    5. Impacted cerumen of left ear    - REMOVE IMPACTED CERUMEN    End of Life Planning:  Patient currently has an advanced directive: No.  I have verified the patient's ablity to prepare an advanced directive/make health care decisions.  Literature was provided to assist patient in preparing an advanced directive.    COUNSELING:  Reviewed preventive health counseling, as reflected in patient instructions       Osteoporosis Prevention/Bone Health        Estimated body mass index is 22.2 kg/(m^2) as calculated from the following:    Height as of 2/28/17: 5' 5\" (1.651 m).    Weight as of 2/28/17: 133 lb 6.4 oz (60.5 kg).     reports that she has never smoked. She has never used smokeless tobacco.        Appropriate preventive services were discussed with this patient, including applicable screening as appropriate for cardiovascular disease, diabetes, osteopenia/osteoporosis, and glaucoma.  As appropriate for age/gender, discussed screening for colorectal cancer, prostate cancer, breast cancer, and cervical cancer. Checklist reviewing preventive services available has been given to the patient.    Reviewed patients plan of care and provided an AVS. The Basic Care Plan (routine screening as documented in Health Maintenance) for Nita meets the Care Plan requirement. This Care Plan has been established and reviewed with the Patient.    Counseling Resources:  ATP IV Guidelines  Pooled Cohorts Equation Calculator  Breast Cancer Risk Calculator  FRAX Risk Assessment  ICSI Preventive Guidelines  Dietary Guidelines for Americans, 2010  USDA's MyPlate  ASA Prophylaxis  Lung CA Screening    Jerilyn Henriquez MD  [facility]  "

## 2017-08-23 ENCOUNTER — OFFICE VISIT (OUTPATIENT)
Dept: FAMILY MEDICINE | Facility: CLINIC | Age: 67
End: 2017-08-23
Payer: COMMERCIAL

## 2017-08-23 DIAGNOSIS — L82.1 SEBORRHEIC KERATOSES: Primary | ICD-10-CM

## 2017-08-23 PROCEDURE — 99213 OFFICE O/P EST LOW 20 MIN: CPT | Performed by: FAMILY MEDICINE

## 2017-08-23 NOTE — MR AVS SNAPSHOT
After Visit Summary   8/23/2017    Nita Trevizo    MRN: [id number]           Patient Information     Date Of Birth          1950        Visit Information        Provider Department      8/23/2017 11:40 AM Luna Ventura MD [facility] - Primary Care Skin        Today's Diagnoses     Seborrheic keratoses    -  1      Care Instructions    FUTURE APPOINTMENTS  Follow up as needed.    DRY SKIN INSTRUCTIONS  Routine use of moisturizer is important for healthy, resilient skin.    Twice daily use of a moisturizer such as over-the-counter (OTC) CeraVe moisturizer cream (in the jar), OTC Cetaphil moisturizer cream or OTC Vanicream. CeraVe products contain ceramides and filaggrin proteins that can help to maintain the body's moisture layer.    Always apply moisturizer after washing, within 3 minutes of drying off for best effect.    Do not overuse soap. Just apply soap on the groin and armpits, unless you have been sweating extensively. Recommended products include OTC unscented Dove sensitive skin or OTC Vanicream cleansing bar.    Good facial cleansers include OTC CeraVe hydrating facial cleanser or OTC Cetaphil daily facial cleanser.    Avoid use of scented products and/or antistatic dryer sheets.    Always try to wear rubber gloves when washing dishes or cleaning.          Follow-ups after your visit        Your next 10 appointments already scheduled     Aug 23, 2017 11:40 AM CDT   Office Visit with Luna Ventura MD   [facility] - Primary Care Skin ([facility] )    [address] [place] [id number] [phone]           Bring a current list of meds and any records pertaining to this visit. For Physicals, please bring immunization records and any forms needing to be filled out. Please arrive 10 minutes early to complete paperwork.              Who to contact     If you have questions or need follow up  information about today's clinic visit or your schedule please contact [facility] - PRIMARY CARE SKIN directly at [phone].  Normal or non-critical lab and imaging results will be communicated to you by MyChart, letter or phone within 4 business days after the clinic has received the results. If you do not hear from us within 7 days, please contact the clinic through AdStack[employer] or phone. If you have a critical or abnormal lab result, we will notify you by phone as soon as possible.  Submit refill requests through [employer]Dash or call your pharmacy and they will forward the refill request to us. Please allow 3 business days for your refill to be completed.          Additional Information About Your Visit        AdStackhart Information     Bu[employer] gives you secure access to your electronic health record. If you see a primary care provider, you can also send messages to your care team and make appointments. If you have questions, please call your primary care clinic.  If you do not have a primary care provider, please call [phone] and they will assist you.        Care EveryWhere ID     This is your Care EveryWhere ID. This could be used by other organizations to access your [place] medical records  [id number]         Blood Pressure from Last 3 Encounters:   08/10/17 (!) 88/62   02/28/17 108/66   01/27/17 118/68    Weight from Last 3 Encounters:   08/10/17 140 lb (63.5 kg)   02/28/17 133 lb 6.4 oz (60.5 kg)   01/27/17 135 lb (61.2 kg)              Today, you had the following     No orders found for display       Primary Care Provider Office Phone # Fax #    Jerilyn Henriquez [phone]       [address]  [facility] [address]        Equal Access to Services     [facility]: Hadii aad rosalina martinez Soanitra, waaxda luqadaha, qaybta kaalmada hector, sandra ge. So [facility] [phone].    ATENCIÓN: Si habla español, tiene a gomes disposición servicios gratuitos de  inge lingüística. Fariha al [phone].    We comply with applicable federal civil rights laws and [place] laws. We do not discriminate on the basis of race, color, national origin, age, disability sex, sexual orientation or gender identity.            Thank you!     Thank you for choosing [facility] - PRIMARY CARE [facility]  for your care. Our goal is always to provide you with excellent care. Hearing back from our patients is one way we can continue to improve our services. Please take a few minutes to complete the written survey that you may receive in the mail after your visit with us. Thank you!             Your Updated Medication List - Protect others around you: Learn how to safely use, store and throw away your medicines at www.disposemymeds.org.          This list is accurate as of: 8/23/17 11:17 AM.  Always use your most recent med list.                   Brand Name Dispense Instructions for use Diagnosis    estradiol 0.0375 MG/24HR Ptwk     12 patch    Place 1 patch onto the skin once a week    Symptomatic menopausal or female climacteric states       ibuprofen 200 MG tablet    ADVIL/MOTRIN     2 TABLET EVERY 4 TO 6 HOURS AS NEEDED for headaches        KRILL OIL PO      250 mg capsules        naproxen 500 MG Tbec     30    1 TABLET TWICE DAILY prn    Sciatica       nitroGLYcerin 0.4 MG sublingual tablet    NITROSTAT    25 tablet    Place 1 tablet (0.4 mg) under the tongue every 5 minutes as needed for chest pain If you are still having symptoms after 3 doses (15 minutes) call 911.        SYSTANE ULTRA PF 0.4-0.3 % Soln ophthalmic solution   Generic drug:  polyethylene glycol 0.4%- propylene glycol 0.3%      Place 1 drop into both eyes 3 times daily as needed for dry eyes Reported on 2/28/2017    Dry eyes, bilateral

## 2017-08-23 NOTE — PROGRESS NOTES
[facility] - PRIMARY CARE SKIN    CC : Lesion(s)  SUBJECTIVE:                                                    Nita Trevizo is a 67 year old female who presents to clinic today because of a lesion on the back.    Bothersome lesions noticed by the patient or other skin concerns :  Issue One : There are itchy areas on the back. Itchiness is also noted on the arms with skin flaking. She uses CeraVe moisturizer cream and Lubriderm moisturizers, Dove for sensitive skin soap. She has also used Norwex silver-impregnated wash rag, [employer] facial cleanser.  Onset : unknown.  Enlarging : unknown.  Bleeding : YES - when scratched  Itchy or irritating : YES.  Pain or tenderness : NO.  Changing color : NO.    Sun Exposure History  Previous history of significant sun exposure:  Blistering sunburns : YES  Tanning beds : NO.  Sunscreen Use : YES.  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     Refer to electronic medical record (EMR) for past medical history and medications.    INTEGUMENTARY/SKIN: POSITIVE for pruritis  ROS : 14 point review of systems was negative except the symptoms listed above in the HPI.    This document serves as a record of the services and decisions personally performed and made by Constance Ventura MD. It was created on her behalf by Luis Felipe Gunderson, a trained medical scribe.  The creation of this document is based on the scribe's personal observations and the provider's statements to the medical scribe.  Luis Felipe Gunderson, August 23, 2017 11:09 AM      OBJECTIVE:                       "                              GENERAL: healthy, alert and no distress  SKIN: [place] Skin Type - II.  Trunk were examined. The dermatoscope was used to help evaluate pigmented lesions.  Skin Pertinent Findings:  Back : Multiple, 3 mm - 7 mm in size, \"stuck on\" appearing papules, raised, brown, coarse-textured, round lesion(s) most consistent with seborrheic keratoses.     Left upper back : Partially excoriated seborrheic keratosis.    Diagnostic Test Results:  none           ASSESSMENT:                                                      Encounter Diagnosis   Name Primary?     Seborrheic keratoses Yes         PLAN:                                                    Patient Instructions   FUTURE APPOINTMENTS  Follow up as needed.    DRY SKIN INSTRUCTIONS  Routine use of moisturizer is important for healthy, resilient skin.    Twice daily use of a moisturizer such as over-the-counter (OTC) CeraVe moisturizer cream (in the jar), OTC Cetaphil moisturizer cream or OTC Vanicream. CeraVe products contain ceramides and filaggrin proteins that can help to maintain the body's moisture layer.    Always apply moisturizer after washing, within 3 minutes of drying off for best effect.    Do not overuse soap. Just apply soap on the groin and armpits, unless you have been sweating extensively. Recommended products include OTC unscented Dove sensitive skin or OTC Vanicream cleansing bar.    Good facial cleansers include OTC CeraVe hydrating facial cleanser or OTC Cetaphil daily facial cleanser.    Avoid use of scented products and/or antistatic dryer sheets.    Always try to wear rubber gloves when washing dishes or cleaning.        PROCEDURES:                                                    None.    TT : 20 minutes.  CT : 15 minutes.      The information in this document, created by the medical scribe for me, accurately reflects the services I personally performed and the decisions made by me. I have reviewed and approved " this document for accuracy prior to leaving the patient care area.  Constance Ventura MD August 23, 2017 11:09 AM  [facility] - PRIMARY CARE SKIN

## 2017-08-23 NOTE — PATIENT INSTRUCTIONS
FUTURE APPOINTMENTS  Follow up as needed.    DRY SKIN INSTRUCTIONS  Routine use of moisturizer is important for healthy, resilient skin.    Twice daily use of a moisturizer such as over-the-counter (OTC) CeraVe moisturizer cream (in the jar), OTC Cetaphil moisturizer cream or OTC Vanicream. CeraVe products contain ceramides and filaggrin proteins that can help to maintain the body's moisture layer.    Always apply moisturizer after washing, within 3 minutes of drying off for best effect.    Do not overuse soap. Just apply soap on the groin and armpits, unless you have been sweating extensively. Recommended products include OTC unscented Dove sensitive skin or OTC Vanicream cleansing bar.    Good facial cleansers include OTC CeraVe hydrating facial cleanser or OTC Cetaphil daily facial cleanser.    Avoid use of scented products and/or antistatic dryer sheets.    Always try to wear rubber gloves when washing dishes or cleaning.

## 2017-10-19 ENCOUNTER — OFFICE VISIT (OUTPATIENT)
Dept: INTERNAL MEDICINE | Facility: CLINIC | Age: 67
End: 2017-10-19
Payer: COMMERCIAL

## 2017-10-19 VITALS
HEIGHT: 65 IN | HEART RATE: 106 BPM | OXYGEN SATURATION: 97 % | BODY MASS INDEX: 22.99 KG/M2 | WEIGHT: 138 LBS | DIASTOLIC BLOOD PRESSURE: 58 MMHG | TEMPERATURE: 98.2 F | RESPIRATION RATE: 16 BRPM | SYSTOLIC BLOOD PRESSURE: 108 MMHG

## 2017-10-19 DIAGNOSIS — Z01.818 PREOP GENERAL PHYSICAL EXAM: Primary | ICD-10-CM

## 2017-10-19 LAB
ERYTHROCYTE [DISTWIDTH] IN BLOOD BY AUTOMATED COUNT: 14.7 % (ref 10–15)
HCT VFR BLD AUTO: 43.9 % (ref 35–47)
HGB BLD-MCNC: 14.2 G/DL (ref 11.7–15.7)
MCH RBC QN AUTO: 28 PG (ref 26.5–33)
MCHC RBC AUTO-ENTMCNC: 32.3 G/DL (ref 31.5–36.5)
MCV RBC AUTO: 87 FL (ref 78–100)
PLATELET # BLD AUTO: 185 10E9/L (ref 150–450)
RBC # BLD AUTO: 5.07 10E12/L (ref 3.8–5.2)
WBC # BLD AUTO: 6.7 10E9/L (ref 4–11)

## 2017-10-19 PROCEDURE — 85027 COMPLETE CBC AUTOMATED: CPT | Performed by: NURSE PRACTITIONER

## 2017-10-19 PROCEDURE — 99214 OFFICE O/P EST MOD 30 MIN: CPT | Performed by: NURSE PRACTITIONER

## 2017-10-19 PROCEDURE — 80048 BASIC METABOLIC PNL TOTAL CA: CPT | Performed by: NURSE PRACTITIONER

## 2017-10-19 PROCEDURE — 36415 COLL VENOUS BLD VENIPUNCTURE: CPT | Performed by: NURSE PRACTITIONER

## 2017-10-19 RX ORDER — POLYETHYLENE GLYCOL 3350 17 G/17G
1 POWDER, FOR SOLUTION ORAL PRN
COMMUNITY

## 2017-10-19 NOTE — MR AVS SNAPSHOT
After Visit Summary   10/19/2017    Nita Trevizo    MRN: [id number]           Patient Information     Date Of Birth          1950        Visit Information        Provider Department      10/19/2017 12:40 PM Deidre Lowry NP [facility]        Today's Diagnoses     Preop general physical exam    -  1      Care Instructions      Before Your Surgery      Call your surgeon if there is any change in your health. This includes signs of a cold or flu (such as a sore throat, runny nose, cough, rash or fever).    Do not smoke, drink alcohol or take over the counter medicine (unless your surgeon or primary care doctor tells you to) for the 24 hours before and after surgery.    If you take prescribed drugs: Follow your doctor s orders about which medicines to take and which to stop until after surgery.    Eating and drinking prior to surgery: follow the instructions from your surgeon    Take a shower or bath the night before surgery. Use the soap your surgeon gave you to gently clean your skin. If you do not have soap from your surgeon, use your regular soap. Do not shave or scrub the surgery site.  Wear clean pajamas and have clean sheets on your bed.           Follow-ups after your visit        Who to contact     If you have questions or need follow up information about today's clinic visit or your schedule please contact [facility] directly at [phone].  Normal or non-critical lab and imaging results will be communicated to you by MyChart, letter or phone within 4 business days after the clinic has received the results. If you do not hear from us within 7 days, please contact the clinic through MyChart or phone. If you have a critical or abnormal lab result, we will notify you by phone as soon as possible.  Submit refill requests through [employer] or call your pharmacy and they will forward the refill request to us. Please allow 3 business days for your  "refill to be completed.          Additional Information About Your Visit        MyChart Information     [employer] gives you secure access to your electronic health record. If you see a primary care provider, you can also send messages to your care team and make appointments. If you have questions, please call your primary care clinic.  If you do not have a primary care provider, please call [phone] and they will assist you.        Care EveryWhere ID     This is your Care EveryWhere ID. This could be used by other organizations to access your [place] medical records  [id number]        Your Vitals Were     Pulse Temperature Respirations Height Pulse Oximetry BMI (Body Mass Index)    106 98.2  F (36.8  C) (Oral) 16 5' 5\" (1.651 m) 97% 22.96 kg/m2       Blood Pressure from Last 3 Encounters:   10/19/17 108/58   08/10/17 (!) 88/62   02/28/17 108/66    Weight from Last 3 Encounters:   10/19/17 138 lb (62.6 kg)   08/10/17 140 lb (63.5 kg)   02/28/17 133 lb 6.4 oz (60.5 kg)              We Performed the Following     Basic metabolic panel     CBC with platelets        Primary Care Provider Office Phone # Fax #    Deidre Lowry, MARCELA [phone]       [address] [facility] [address]        Equal Access to Services     HELENA GRACE : Hadii aad ku hadasho Soomaali, waaxda luqadaha, qaybta kaalmada adeegyada, waxay yosvany hayrebecca forrest . So [facility] [phone].    ATENCIÓN: Si habla español, tiene a gomes disposición servicios gratuitos de asistencia lingüística. Llame al [phone].    We comply with applicable federal civil rights laws and [place] laws. We do not discriminate on the basis of race, color, national origin, age, disability, sex, sexual orientation, or gender identity.            Thank you!     Thank you for choosing [facility]  for your care. Our goal is always to provide you with excellent care. Hearing back from our patients is one way we can " continue to improve our services. Please take a few minutes to complete the written survey that you may receive in the mail after your visit with us. Thank you!             Your Updated Medication List - Protect others around you: Learn how to safely use, store and throw away your medicines at www.disposemymeds.org.          This list is accurate as of: 10/19/17  1:21 PM.  Always use your most recent med list.                   Brand Name Dispense Instructions for use Diagnosis    estradiol 0.0375 MG/24HR Ptwk     12 patch    Place 1 patch onto the skin once a week    Symptomatic menopausal or female climacteric states       ibuprofen 200 MG tablet    ADVIL/MOTRIN     2 TABLET EVERY 4 TO 6 HOURS AS NEEDED for headaches        KRILL OIL PO      250 mg capsules        naproxen 500 MG Tbec     30    1 TABLET TWICE DAILY prn    Sciatica       nitroGLYcerin 0.4 MG sublingual tablet    NITROSTAT    25 tablet    Place 1 tablet (0.4 mg) under the tongue every 5 minutes as needed for chest pain If you are still having symptoms after 3 doses (15 minutes) call 911.        polyethylene glycol powder    MIRALAX/GLYCOLAX     Take 1 capful by mouth as needed for constipation        SYSTANE ULTRA PF 0.4-0.3 % Soln ophthalmic solution   Generic drug:  polyethylene glycol 0.4%- propylene glycol 0.3%      Place 1 drop into both eyes 3 times daily as needed for dry eyes Reported on 2/28/2017    Dry eyes, bilateral

## 2017-10-19 NOTE — NURSING NOTE
"Chief Complaint   Patient presents with     Pre-Op Exam     rt wrist.       Initial /58 (BP Location: Right arm, Patient Position: Sitting, Cuff Size: Adult Large)  Pulse 106  Temp 98.2  F (36.8  C) (Oral)  Resp 16  Ht 5' 5\" (1.651 m)  Wt 138 lb (62.6 kg)  SpO2 97%  BMI 22.96 kg/m2 Estimated body mass index is 22.96 kg/(m^2) as calculated from the following:    Height as of this encounter: 5' 5\" (1.651 m).    Weight as of this encounter: 138 lb (62.6 kg).  Medication Reconciliation: complete    "

## 2017-10-19 NOTE — LETTER
.[place] [address], [address]  [place] [address]  Appointments: [phone]      October 23, 2017    Nita Trevizo  [address]  [facility] [address]          Dear Nita,      The results of your recent lab tests were within acceptable limits/stable.    If you have any further questions or problems, please contact our office.    Sincerely,    Deidre Lowry CNP

## 2017-10-19 NOTE — PROGRESS NOTES
[place] [address]  [facility] [address]  [phone]  Dept: [phone]    PRE-OP EVALUATION:  Today's date: 10/19/2017    Nita Trevizo (: 1950) presents for pre-operative evaluation assessment as requested by Dr. Devora Stevens.  She requires evaluation and anesthesia risk assessment prior to undergoing surgery/procedure for treatment of rt wrist tendon repair.  Proposed procedure: R wrist tendon repair and fusion    Date of Surgery/ Procedure: 10/31/17  Time of Surgery/ Procedure: 11:15  Hospital/Surgical Facility: [facility]  Fax number for surgical facility: [phone]  Primary Physician: Deidre Lowry  Type of Anesthesia Anticipated: General    Patient has a Health Care Directive or Living Will:  NO    Preop Questions 10/19/2017   1.  Do you have a history of heart attack, stroke, stent, bypass or surgery on an artery in the head, neck, heart or legs? No   2.  Do you ever have any pain or discomfort in your chest? No   3.  Do you have a history of  Heart Failure? No   4.   Are you troubled by shortness of breath when:  walking on a level surface, or up a slight hill, or at night? No   5.  Do you currently have a cold, bronchitis or other respiratory infection? No   6.  Do you have a cough, shortness of breath, or wheezing? No   7.  Do you sometimes get pains in the calves of your legs when you walk? No   8. Do you or anyone in your family have previous history of blood clots? No   9.  Do you or does anyone in your family have a serious bleeding problem such as prolonged bleeding following surgeries or cuts? No   10. Have you ever had problems with anemia or been told to take iron pills? No   11. Have you had any abnormal blood loss such as black, tarry or bloody stools, or abnormal vaginal bleeding? No   12. Have you ever had a blood transfusion? No   13. Have you or any of your relatives ever had problems with anesthesia? YES - nausea    14. Do you have sleep apnea, excessive snoring or daytime drowsiness? No   15. Do you have any prosthetic heart valves? No   16. Do you have prosthetic joints? No   17. Is there any chance that you may be pregnant? No           HPI:                                                      Brief HPI related to upcoming procedure: R wrist tendon repair      See problem list for active medical problems.  Problems all longstanding and stable, except as noted/documented.  See ROS for pertinent symptoms related to these conditions.                                                                                                  .    MEDICAL HISTORY:                                                    Patient Active Problem List    Diagnosis Date Noted     Dry eyes, bilateral 10/12/2016     Priority: Medium     Osteopenia 08/01/2016     Priority: Medium     CARDIOVASCULAR SCREENING; LDL GOAL LESS THAN 130 08/01/2016     Priority: Medium     Esophageal reflux 06/27/2006     Priority: Medium     Chronic maxillary sinusitis 08/03/2004     Priority: Medium      Past Medical History:   Diagnosis Date     Arthritis      Disorder of bone and cartilage, unspecified      Esophageal reflux      Osteopenia 6/14    -1.5     Past Surgical History:   Procedure Laterality Date     C APPENDECTOMY       C LIGATE FALLOPIAN TUBE       C NONSPECIFIC PROCEDURE      vein stripping x 3     C TOTAL ABDOM HYSTERECTOMY      for fibroids with BSO     HC REMOVAL GALLBLADDER       HC REMOVE TONSILS/ADENOIDS,<11 Y/O       ORTHOPEDIC SURGERY      wrist 2017 [facility]      Current Outpatient Prescriptions   Medication Sig Dispense Refill     polyethylene glycol (MIRALAX/GLYCOLAX) powder Take 1 capful by mouth as needed for constipation       KRILL OIL  mg capsules       estradiol 0.0375 MG/24HR PTWK Place 1 patch onto the skin once a week 12 patch 3     polyethylene glycol 0.4%- propylene glycol 0.3% (SYSTANE ULTRA PF) 0.4-0.3 % SOLN ophthalmic  "solution Place 1 drop into both eyes 3 times daily as needed for dry eyes Reported on 2/28/2017       nitroglycerin (NITROSTAT) 0.4 MG SL tablet Place 1 tablet (0.4 mg) under the tongue every 5 minutes as needed for chest pain If you are still having symptoms after 3 doses (15 minutes) call 911. 25 tablet 0     NAPROXEN 500 MG OR TBEC 1 TABLET TWICE DAILY prn 30 3     IBUPROFEN 200 MG OR TABS 2 TABLET EVERY 4 TO 6 HOURS AS NEEDED for headaches        OTC products: None, except as noted above    Allergies   Allergen Reactions     Amides      Neosporin \"rash\"     Codeine      \"chest pain\"      Latex Allergy: NO    Social History   Substance Use Topics     Smoking status: Never Smoker     Smokeless tobacco: Never Used     Alcohol use No     History   Drug Use No       REVIEW OF SYSTEMS:                                                    C: NEGATIVE for fever, chills, change in weight  E/M: NEGATIVE for ear, mouth and throat problems  R: NEGATIVE for significant cough or SOB  CV: NEGATIVE for chest pain, palpitations or peripheral edema  GI: NEGATIVE for nausea, abdominal pain, heartburn, or change in bowel habits  : NEGATIVE for frequency, dysuria, or hematuria  M: NEGATIVE for significant arthralgias or myalgia  N: NEGATIVE for weakness, dizziness or paresthesias  E: NEGATIVE for temperature intolerance, skin/hair changes  H: NEGATIVE for bleeding problems  P: NEGATIVE for changes in mood or affect    EXAM:                                                    /58 (BP Location: Right arm, Patient Position: Sitting, Cuff Size: Adult Large)  Pulse 106  Temp 98.2  F (36.8  C) (Oral)  Resp 16  Ht 5' 5\" (1.651 m)  Wt 138 lb (62.6 kg)  SpO2 97%  BMI 22.96 kg/m2    GENERAL APPEARANCE: healthy, alert and no distress     NECK: no adenopathy, no asymmetry, masses, or scars and thyroid normal to palpation     RESP: lungs clear to auscultation - no rales, rhonchi or wheezes     CV: regular rates and rhythm, normal S1 " S2, no S3 or S4 and no murmur, click or rub     ABDOMEN:  soft, nontender, no HSM or masses and bowel sounds normal     MS: extremities normal- no gross deformities noted, no evidence of inflammation in joints, FROM in all extremities.     NEURO: Normal strength and tone, sensory exam grossly normal, mentation intact and speech normal     PSYCH: mentation appears normal. and affect normal/bright    DIAGNOSTICS:                                                    EKG: Not indicated due to non-vascular surgery and last ekg on 8/10/17 (within 30 days for CAD history or last year for cardiac risk factors)    Recent Labs   Lab Test  08/10/17   0854  02/28/17   1055   HGB   --   15.0   PLT   --   196   NA  142  141   POTASSIUM  4.5  4.0   CR  0.65  0.64        IMPRESSION:                                                    Reason for surgery/procedure: R wrist tendon repair    The proposed surgical procedure is considered LOW risk.    REVISED CARDIAC RISK INDEX  The patient has the following serious cardiovascular risks for perioperative complications such as (MI, PE, VFib and 3  AV Block):  No serious cardiac risks  INTERPRETATION: 0 risks: Class I (very low risk - 0.4% complication rate)    The patient has the following additional risks for perioperative complications:  No identified additional risks    (Z01.818) Preop general physical exam  (primary encounter diagnosis)  Comment:   Plan: CBC with platelets, Basic metabolic panel                RECOMMENDATIONS:                                                      --Consult hospital rounder / IM to assist post-op medical management    --Patient is to take all scheduled medications on the day of surgery EXCEPT for modifications listed below.    APPROVAL GIVEN to proceed with proposed procedure, without further diagnostic evaluation       Signed Electronically by: Deidre Lowry NP    Copy of this evaluation report is provided to requesting physicianKylie Martinez Preop  Guidelines

## 2017-10-20 LAB
ANION GAP SERPL CALCULATED.3IONS-SCNC: 9 MMOL/L (ref 3–14)
BUN SERPL-MCNC: 15 MG/DL (ref 7–30)
CALCIUM SERPL-MCNC: 8.5 MG/DL (ref 8.5–10.1)
CHLORIDE SERPL-SCNC: 102 MMOL/L (ref 94–109)
CO2 SERPL-SCNC: 27 MMOL/L (ref 20–32)
CREAT SERPL-MCNC: 0.69 MG/DL (ref 0.52–1.04)
GFR SERPL CREATININE-BSD FRML MDRD: 85 ML/MIN/1.7M2
GLUCOSE SERPL-MCNC: 69 MG/DL (ref 70–99)
POTASSIUM SERPL-SCNC: 3.9 MMOL/L (ref 3.4–5.3)
SODIUM SERPL-SCNC: 138 MMOL/L (ref 133–144)

## 2018-03-12 ENCOUNTER — OFFICE VISIT (OUTPATIENT)
Dept: INTERNAL MEDICINE | Facility: CLINIC | Age: 68
End: 2018-03-12
Payer: COMMERCIAL

## 2018-03-12 VITALS
OXYGEN SATURATION: 98 % | BODY MASS INDEX: 22.99 KG/M2 | HEIGHT: 65 IN | WEIGHT: 138 LBS | HEART RATE: 90 BPM | RESPIRATION RATE: 16 BRPM | DIASTOLIC BLOOD PRESSURE: 66 MMHG | SYSTOLIC BLOOD PRESSURE: 116 MMHG | TEMPERATURE: 98.6 F

## 2018-03-12 DIAGNOSIS — Z01.818 PRE-OP EXAM: Primary | ICD-10-CM

## 2018-03-12 LAB
ERYTHROCYTE [DISTWIDTH] IN BLOOD BY AUTOMATED COUNT: 15.4 % (ref 10–15)
HCT VFR BLD AUTO: 44.2 % (ref 35–47)
HGB BLD-MCNC: 13.8 G/DL (ref 11.7–15.7)
MCH RBC QN AUTO: 27.7 PG (ref 26.5–33)
MCHC RBC AUTO-ENTMCNC: 31.2 G/DL (ref 31.5–36.5)
MCV RBC AUTO: 89 FL (ref 78–100)
PLATELET # BLD AUTO: 202 10E9/L (ref 150–450)
RBC # BLD AUTO: 4.98 10E12/L (ref 3.8–5.2)
WBC # BLD AUTO: 5.6 10E9/L (ref 4–11)

## 2018-03-12 PROCEDURE — 80048 BASIC METABOLIC PNL TOTAL CA: CPT | Performed by: NURSE PRACTITIONER

## 2018-03-12 PROCEDURE — 93000 ELECTROCARDIOGRAM COMPLETE: CPT | Performed by: NURSE PRACTITIONER

## 2018-03-12 PROCEDURE — 36415 COLL VENOUS BLD VENIPUNCTURE: CPT | Performed by: NURSE PRACTITIONER

## 2018-03-12 PROCEDURE — 99214 OFFICE O/P EST MOD 30 MIN: CPT | Performed by: NURSE PRACTITIONER

## 2018-03-12 PROCEDURE — 85027 COMPLETE CBC AUTOMATED: CPT | Performed by: NURSE PRACTITIONER

## 2018-03-12 RX ORDER — NITROGLYCERIN 0.4 MG/1
0.4 TABLET SUBLINGUAL EVERY 5 MIN PRN
Qty: 25 TABLET | Refills: 0 | Status: SHIPPED | OUTPATIENT
Start: 2018-03-12 | End: 2021-09-13

## 2018-03-12 NOTE — LETTER
March 26, 2018      Nita Trevizo  [address]  [facility] [place] [address]        Dear ,    We are writing to inform you of your test results.    Test results were within normal range/stable    Resulted Orders   CBC with platelets   Result Value Ref Range    WBC 5.6 4.0 - 11.0 10e9/L    RBC Count 4.98 3.8 - 5.2 10e12/L    Hemoglobin 13.8 11.7 - 15.7 g/dL    Hematocrit 44.2 35.0 - 47.0 %    MCV 89 78 - 100 fl    MCH 27.7 26.5 - 33.0 pg    MCHC 31.2 (L) 31.5 - 36.5 g/dL    RDW 15.4 (H) 10.0 - 15.0 %    Platelet Count 202 150 - 450 10e9/L      Comment:      Results confirmed by repeat test   Basic metabolic panel   Result Value Ref Range    Sodium 142 133 - 144 mmol/L    Potassium 3.7 3.4 - 5.3 mmol/L    Chloride 106 94 - 109 mmol/L    Carbon Dioxide 30 20 - 32 mmol/L    Anion Gap 6 3 - 14 mmol/L    Glucose 91 70 - 99 mg/dL      Comment:      Non Fasting    Urea Nitrogen 13 7 - 30 mg/dL    Creatinine 0.61 0.52 - 1.04 mg/dL    GFR Estimate >90 >60 mL/min/1.7m2      Comment:      Non  GFR Calc    GFR Estimate If Black >90 >60 mL/min/1.7m2      Comment:       GFR Calc    Calcium 8.7 8.5 - 10.1 mg/dL       If you have any questions or concerns, please call the clinic at the number listed above.       Sincerely,        Deidre Lowry NP

## 2018-03-12 NOTE — MR AVS SNAPSHOT
After Visit Summary   3/12/2018    Nita Trevizo    MRN: [id number]           Patient Information     Date Of Birth          1950        Visit Information        Provider Department      3/12/2018 2:20 PM Deidre Lowry NP [facility]        Today's Diagnoses     Pre-op exam    -  1      Care Instructions      Before Your Surgery      Call your surgeon if there is any change in your health. This includes signs of a cold or flu (such as a sore throat, runny nose, cough, rash or fever).    Do not smoke, drink alcohol or take over the counter medicine (unless your surgeon or primary care doctor tells you to) for the 24 hours before and after surgery.    If you take prescribed drugs: Follow your doctor s orders about which medicines to take and which to stop until after surgery.    Eating and drinking prior to surgery: follow the instructions from your surgeon    Take a shower or bath the night before surgery. Use the soap your surgeon gave you to gently clean your skin. If you do not have soap from your surgeon, use your regular soap. Do not shave or scrub the surgery site.  Wear clean pajamas and have clean sheets on your bed.           Follow-ups after your visit        Who to contact     If you have questions or need follow up information about today's clinic visit or your schedule please contact [facility] directly at [phone].  Normal or non-critical lab and imaging results will be communicated to you by MyChart, letter or phone within 4 business days after the clinic has received the results. If you do not hear from us within 7 days, please contact the clinic through MyChart or phone. If you have a critical or abnormal lab result, we will notify you by phone as soon as possible.  Submit refill requests through [employer] or call your pharmacy and they will forward the refill request to us. Please allow 3 business days for your refill to be completed.  "         Additional Information About Your Visit        MyChart Information     [employer] gives you secure access to your electronic health record. If you see a primary care provider, you can also send messages to your care team and make appointments. If you have questions, please call your primary care clinic.  If you do not have a primary care provider, please call [phone] and they will assist you.        Care EveryWhere ID     This is your Care EveryWhere ID. This could be used by other organizations to access your [place] medical records  [id number]        Your Vitals Were     Pulse Temperature Respirations Height Pulse Oximetry BMI (Body Mass Index)    90 98.6  F (37  C) (Oral) 16 5' 5\" (1.651 m) 98% 22.96 kg/m2       Blood Pressure from Last 3 Encounters:   03/12/18 116/66   10/19/17 108/58   08/10/17 (!) 88/62    Weight from Last 3 Encounters:   03/12/18 138 lb (62.6 kg)   10/19/17 138 lb (62.6 kg)   08/10/17 140 lb (63.5 kg)              We Performed the Following     Basic metabolic panel     CBC with platelets     EKG 12-lead complete w/read - Clinics          Where to get your medicines      Some of these will need a paper prescription and others can be bought over the counter.  Ask your nurse if you have questions.     Bring a paper prescription for each of these medications     nitroGLYcerin 0.4 MG sublingual tablet          Primary Care Provider Office Phone # Fax #    Deidre Lowry [phone]       [address] [facility] [address]        Equal Access to Services     [facility] AH: Hadii aad ku hadasho Soomaali, waaxda luqadaha, qaybta kaalmada adeegyada, sandra forrest . So [facility] [phone].    ATENCIÓN: Si habla español, tiene a gomes disposición servicios gratuitos de asistencia lingüística. Llame al [phone].    We comply with applicable federal civil rights laws and [place] laws. We do not discriminate on the basis of race, " color, national origin, age, disability, sex, sexual orientation, or gender identity.            Thank you!     Thank you for choosing [facility]  for your care. Our goal is always to provide you with excellent care. Hearing back from our patients is one way we can continue to improve our services. Please take a few minutes to complete the written survey that you may receive in the mail after your visit with us. Thank you!             Your Updated Medication List - Protect others around you: Learn how to safely use, store and throw away your medicines at www.disposemymeds.org.          This list is accurate as of 3/12/18  2:50 PM.  Always use your most recent med list.                   Brand Name Dispense Instructions for use Diagnosis    estradiol 0.0375 MG/24HR Ptwk     12 patch    Place 1 patch onto the skin once a week    Symptomatic menopausal or female climacteric states       ibuprofen 200 MG tablet    ADVIL/MOTRIN     2 TABLET EVERY 4 TO 6 HOURS AS NEEDED for headaches        KRILL OIL PO      250 mg capsules        naproxen 500 MG Tbec     30    1 TABLET TWICE DAILY prn    Sciatica       nitroGLYcerin 0.4 MG sublingual tablet    NITROSTAT    25 tablet    Place 1 tablet (0.4 mg) under the tongue every 5 minutes as needed for chest pain If you are still having symptoms after 3 doses (15 minutes) call 911.    Pre-op exam       polyethylene glycol powder    MIRALAX/GLYCOLAX     Take 1 capful by mouth as needed for constipation        SYSTANE ULTRA PF 0.4-0.3 % Soln ophthalmic solution   Generic drug:  polyethylene glycol 0.4%- propylene glycol 0.3%      Place 1 drop into both eyes 3 times daily as needed for dry eyes Reported on 2/28/2017    Dry eyes, bilateral

## 2018-03-12 NOTE — NURSING NOTE
"Chief Complaint   Patient presents with     Pre-Op Exam      Rt Wrist       Initial /66 (BP Location: Right arm, Patient Position: Sitting, Cuff Size: Adult Large)  Pulse 90  Temp 98.6  F (37  C) (Oral)  Resp 16  Ht 5' 5\" (1.651 m)  Wt 138 lb (62.6 kg)  SpO2 98%  BMI 22.96 kg/m2 Estimated body mass index is 22.96 kg/(m^2) as calculated from the following:    Height as of this encounter: 5' 5\" (1.651 m).    Weight as of this encounter: 138 lb (62.6 kg).  Medication Reconciliation: complete    "

## 2018-03-12 NOTE — PROGRESS NOTES
[place] [address]  [facility] [address]  [phone]  Dept: [phone]    PRE-OP EVALUATION:  Today's date: 3/12/2018    Nita Trevizo (: 1950) presents for pre-operative evaluation assessment as requested by Dr. Joi Dunham.  She requires evaluation and anesthesia risk assessment prior to undergoing surgery/procedure for treatment of Rt wrist fusion revision    Fax number for surgical facility: [phone]  Primary Physician: Deidre Lowry  Type of Anesthesia Anticipated: General    Patient has a Health Care Directive or Living Will:  NO    Preop Questions 3/12/2018   Who is doing your surgery? joi dunham   What are you having done? R wrist fusion revision   Date of Surgery/Procedure: 4-3-2018   Facility or Hospital where procedure/surgery will be performed: [facility]   1.  Do you have a history of Heart attack, stroke, stent, coronary bypass surgery, or other heart surgery? No   2.  Do you ever have any pain or discomfort in your chest? No   3.  Do you have a history of  Heart Failure? No   4.   Are you troubled by shortness of breath when:  walking on a level surface, or up a slight hill, or at night? No   5.  Do you currently have a cold, bronchitis or other respiratory infection? No   6.  Do you have a cough, shortness of breath, or wheezing? No   7.  Do you sometimes get pains in the calves of your legs when you walk? No   8. Do you or anyone in your family have previous history of blood clots? No   9.  Do you or does anyone in your family have a serious bleeding problem such as prolonged bleeding following surgeries or cuts? No   10. Have you ever had problems with anemia or been told to take iron pills? No   11. Have you had any abnormal blood loss such as black, tarry or bloody stools, or abnormal vaginal bleeding? No   12. Have you ever had a blood transfusion? No   13. Have you or any of your relatives ever had problems with anesthesia? YES  - PONV   14. Do you have sleep apnea, excessive snoring or daytime drowsiness? No   15. Do you have any prosthetic heart valves? No   16. Do you have prosthetic joints? No   17. Is there any chance that you may be pregnant? No         HPI:     HPI related to upcoming procedure: R wrist fusion revision      See problem list for active medical problems.  Problems all longstanding and stable, except as noted/documented.  See ROS for pertinent symptoms related to these conditions.                                                                                                  .    MEDICAL HISTORY:     Patient Active Problem List    Diagnosis Date Noted     Dry eyes, bilateral 10/12/2016     Priority: Medium     Osteopenia 08/01/2016     Priority: Medium     CARDIOVASCULAR SCREENING; LDL GOAL LESS THAN 130 08/01/2016     Priority: Medium     Esophageal reflux 06/27/2006     Priority: Medium     Chronic maxillary sinusitis 08/03/2004     Priority: Medium      Past Medical History:   Diagnosis Date     Arthritis      Disorder of bone and cartilage, unspecified      Esophageal reflux      Osteopenia 6/14    -1.5     Past Surgical History:   Procedure Laterality Date     C APPENDECTOMY       C LIGATE FALLOPIAN TUBE       C NONSPECIFIC PROCEDURE      vein stripping x 3     C TOTAL ABDOM HYSTERECTOMY      for fibroids with BSO     HC REMOVAL GALLBLADDER       HC REMOVE TONSILS/ADENOIDS,<13 Y/O       ORTHOPEDIC SURGERY      wrist 2017 george dunham      Current Outpatient Prescriptions   Medication Sig Dispense Refill     polyethylene glycol (MIRALAX/GLYCOLAX) powder Take 1 capful by mouth as needed for constipation       KRILL OIL  mg capsules       estradiol 0.0375 MG/24HR PTWK Place 1 patch onto the skin once a week 12 patch 3     polyethylene glycol 0.4%- propylene glycol 0.3% (SYSTANE ULTRA PF) 0.4-0.3 % SOLN ophthalmic solution Place 1 drop into both eyes 3 times daily as needed for dry eyes Reported on 2/28/2017        "nitroglycerin (NITROSTAT) 0.4 MG SL tablet Place 1 tablet (0.4 mg) under the tongue every 5 minutes as needed for chest pain If you are still having symptoms after 3 doses (15 minutes) call 911. 25 tablet 0     NAPROXEN 500 MG OR TBEC 1 TABLET TWICE DAILY prn 30 3     IBUPROFEN 200 MG OR TABS 2 TABLET EVERY 4 TO 6 HOURS AS NEEDED for headaches        OTC products: None, except as noted above    Allergies   Allergen Reactions     Amides      Neosporin \"rash\"     Codeine      \"chest pain\"      Latex Allergy: NO    Social History   Substance Use Topics     Smoking status: Never Smoker     Smokeless tobacco: Never Used     Alcohol use No     History   Drug Use No       REVIEW OF SYSTEMS:   CONSTITUTIONAL: NEGATIVE for fever, chills, change in weight  ENT/MOUTH: NEGATIVE for ear, mouth and throat problems  RESP: NEGATIVE for significant cough or SOB  CV: NEGATIVE for chest pain, palpitations or peripheral edema  GI: NEGATIVE for nausea, abdominal pain, heartburn, or change in bowel habits  : NEGATIVE for frequency, dysuria, or hematuria  MUSCULOSKELETAL: NEGATIVE for significant arthralgias or myalgia  NEURO: NEGATIVE for weakness, dizziness or paresthesias  ENDOCRINE: NEGATIVE for temperature intolerance, skin/hair changes  HEME: NEGATIVE for bleeding problems  PSYCHIATRIC: NEGATIVE for changes in mood or affect    EXAM:   /66 (BP Location: Right arm, Patient Position: Sitting, Cuff Size: Adult Large)  Pulse 90  Temp 98.6  F (37  C) (Oral)  Resp 16  Ht 5' 5\" (1.651 m)  Wt 138 lb (62.6 kg)  SpO2 98%  BMI 22.96 kg/m2    GENERAL APPEARANCE: healthy, alert and no distress     NECK: no adenopathy, no asymmetry, masses, or scars and thyroid normal to palpation     RESP: lungs clear to auscultation - no rales, rhonchi or wheezes     CV: regular rates and rhythm, normal S1 S2, no S3 or S4 and no murmur, click or rub     ABDOMEN:  soft, nontender, no HSM or masses and bowel sounds normal     MS: extremities " normal- no gross deformities noted, no evidence of inflammation in joints, FROM in all extremities.     SKIN: no suspicious lesions or rashes     NEURO: Normal strength and tone, sensory exam grossly normal, mentation intact and speech normal     PSYCH: mentation appears normal. and affect normal/bright    DIAGNOSTICS:   EKG: appears normal, NSR    Recent Labs   Lab Test  10/19/17   1315  08/10/17   0854  02/28/17   1055   HGB  14.2   --   15.0   PLT  185   --   196   NA  138  142  141   POTASSIUM  3.9  4.5  4.0   CR  0.69  0.65  0.64        IMPRESSION:   Reason for surgery/procedure: R wrist fusion revision    The proposed surgical procedure is considered LOW risk.    REVISED CARDIAC RISK INDEX  The patient has the following serious cardiovascular risks for perioperative complications such as (MI, PE, VFib and 3  AV Block):  No serious cardiac risks  INTERPRETATION: 0 risks: Class I (very low risk - 0.4% complication rate)    The patient has the following additional risks for perioperative complications:  No identified additional risks      ICD-10-CM    1. Pre-op exam Z01.818 EKG 12-lead complete w/read - Clinics   2. Preop general physical exam Z01.818        RECOMMENDATIONS:     --Consult hospital rounder / IM to assist post-op medical management    --Patient is to take all scheduled medications on the day of surgery EXCEPT for modifications listed below.    APPROVAL GIVEN to proceed with proposed procedure, without further diagnostic evaluation       Signed Electronically by: Deidre Lowry NP    Copy of this evaluation report is provided to requesting physician.    Michelle Preop Guidelines

## 2018-03-13 LAB
ANION GAP SERPL CALCULATED.3IONS-SCNC: 6 MMOL/L (ref 3–14)
BUN SERPL-MCNC: 13 MG/DL (ref 7–30)
CALCIUM SERPL-MCNC: 8.7 MG/DL (ref 8.5–10.1)
CHLORIDE SERPL-SCNC: 106 MMOL/L (ref 94–109)
CO2 SERPL-SCNC: 30 MMOL/L (ref 20–32)
CREAT SERPL-MCNC: 0.61 MG/DL (ref 0.52–1.04)
GFR SERPL CREATININE-BSD FRML MDRD: >90 ML/MIN/1.7M2
GLUCOSE SERPL-MCNC: 91 MG/DL (ref 70–99)
POTASSIUM SERPL-SCNC: 3.7 MMOL/L (ref 3.4–5.3)
SODIUM SERPL-SCNC: 142 MMOL/L (ref 133–144)

## 2018-07-18 ENCOUNTER — MYC MEDICAL ADVICE (OUTPATIENT)
Dept: INTERNAL MEDICINE | Facility: CLINIC | Age: 68
End: 2018-07-18

## 2018-07-18 DIAGNOSIS — N95.1 SYMPTOMATIC MENOPAUSAL OR FEMALE CLIMACTERIC STATES: ICD-10-CM

## 2018-07-19 RX ORDER — ESTRADIOL 0.04 MG/D
1 PATCH TRANSDERMAL WEEKLY
Qty: 12 PATCH | Refills: 0 | Status: SHIPPED | OUTPATIENT
Start: 2018-07-19 | End: 2019-02-11

## 2018-07-19 NOTE — TELEPHONE ENCOUNTER
"Requested Prescriptions   Pending Prescriptions Disp Refills     estradiol 0.0375 MG/24HR PTWK  Last Written Prescription Date:  8/10/17  Last Fill Quantity: 12,  # refills: 3   Last office visit: 3/12/2018 with prescribing provider:  Alen   Future Office Visit:   Next 5 appointments (look out 90 days)     Aug 13, 2018  9:00 AM CDT   [facility]    [address]  [facility] [id number] [phone]                  12 patch 3     Sig: Place 1 patch onto the skin once a week    Hormone Replacement Therapy Passed    7/19/2018  8:27 AM       Passed - Blood pressure under 140/90 in past 12 months    BP Readings from Last 3 Encounters:   03/12/18 116/66   10/19/17 108/58   08/10/17 (!) 88/62                Passed - Recent (12 mo) or future (30 days) visit within the authorizing provider's specialty    Patient had office visit in the last 12 months or has a visit in the next 30 days with authorizing provider or within the authorizing provider's specialty.  See \"Patient Info\" tab in inbasket, or \"Choose Columns\" in Meds & Orders section of the refill encounter.           Passed - Patient has mammogram in past 2 years on file if age 50-75       Passed - Patient is 18 years of age or older       Passed - No active pregnancy on record       Passed - No positive pregnancy test on record in past 12 months      Medication is being filled for 1 time refill only due to:  future appt scheduled   "

## 2018-08-13 ENCOUNTER — OFFICE VISIT (OUTPATIENT)
Dept: INTERNAL MEDICINE | Facility: CLINIC | Age: 68
End: 2018-08-13
Payer: COMMERCIAL

## 2018-08-13 ENCOUNTER — HOSPITAL ENCOUNTER (OUTPATIENT)
Dept: MAMMOGRAPHY | Facility: CLINIC | Age: 68
Discharge: HOME | End: 2018-08-13
Attending: NURSE PRACTITIONER | Admitting: NURSE PRACTITIONER
Payer: MEDICARE

## 2018-08-13 VITALS
HEART RATE: 95 BPM | DIASTOLIC BLOOD PRESSURE: 62 MMHG | BODY MASS INDEX: 23.39 KG/M2 | HEIGHT: 65 IN | TEMPERATURE: 97.9 F | OXYGEN SATURATION: 97 % | RESPIRATION RATE: 18 BRPM | SYSTOLIC BLOOD PRESSURE: 110 MMHG | WEIGHT: 140.4 LBS

## 2018-08-13 DIAGNOSIS — N95.1 MENOPAUSAL SYNDROME (HOT FLASHES): ICD-10-CM

## 2018-08-13 DIAGNOSIS — Z12.31 VISIT FOR SCREENING MAMMOGRAM: ICD-10-CM

## 2018-08-13 DIAGNOSIS — Z13.6 CARDIOVASCULAR SCREENING; LDL GOAL LESS THAN 130: ICD-10-CM

## 2018-08-13 DIAGNOSIS — Z00.00 HEALTH CARE MAINTENANCE: Primary | ICD-10-CM

## 2018-08-13 LAB
ERYTHROCYTE [DISTWIDTH] IN BLOOD BY AUTOMATED COUNT: 14.9 % (ref 10–15)
HCT VFR BLD AUTO: 43.6 % (ref 35–47)
HGB BLD-MCNC: 13.8 G/DL (ref 11.7–15.7)
MCH RBC QN AUTO: 28 PG (ref 26.5–33)
MCHC RBC AUTO-ENTMCNC: 31.7 G/DL (ref 31.5–36.5)
MCV RBC AUTO: 88 FL (ref 78–100)
PLATELET # BLD AUTO: 196 10E9/L (ref 150–450)
RBC # BLD AUTO: 4.93 10E12/L (ref 3.8–5.2)
WBC # BLD AUTO: 5.2 10E9/L (ref 4–11)

## 2018-08-13 PROCEDURE — 77067 SCR MAMMO BI INCL CAD: CPT

## 2018-08-13 PROCEDURE — 84443 ASSAY THYROID STIM HORMONE: CPT | Performed by: NURSE PRACTITIONER

## 2018-08-13 PROCEDURE — 84460 ALANINE AMINO (ALT) (SGPT): CPT | Performed by: NURSE PRACTITIONER

## 2018-08-13 PROCEDURE — 80048 BASIC METABOLIC PNL TOTAL CA: CPT | Performed by: NURSE PRACTITIONER

## 2018-08-13 PROCEDURE — G0438 PPPS, INITIAL VISIT: HCPCS | Performed by: NURSE PRACTITIONER

## 2018-08-13 PROCEDURE — 85027 COMPLETE CBC AUTOMATED: CPT | Performed by: NURSE PRACTITIONER

## 2018-08-13 PROCEDURE — 80061 LIPID PANEL: CPT | Performed by: NURSE PRACTITIONER

## 2018-08-13 PROCEDURE — 36415 COLL VENOUS BLD VENIPUNCTURE: CPT | Performed by: NURSE PRACTITIONER

## 2018-08-13 RX ORDER — VENLAFAXINE HYDROCHLORIDE 75 MG/1
75 CAPSULE, EXTENDED RELEASE ORAL DAILY
Qty: 90 CAPSULE | Refills: 1 | Status: SHIPPED | OUTPATIENT
Start: 2018-08-13 | End: 2019-02-11

## 2018-08-13 ASSESSMENT — ACTIVITIES OF DAILY LIVING (ADL)
CURRENT_FUNCTION: NO ASSISTANCE NEEDED
I_NEED_ASSISTANCE_FOR_THE_FOLLOWING_DAILY_ACTIVITIES:: NO ASSISTANCE IS NEEDED

## 2018-08-13 NOTE — PROGRESS NOTES
SUBJECTIVE:   Nita Trevizo is a 67 year old female who presents for Preventive Visit:    Are you in the first 12 months of your Medicare Part B coverage?  No    Answers for HPI/ROS submitted by the patient on 8/13/2018   Annual Exam:  Getting at least 3 servings of Calcium per day:: Yes  Bi-annual eye exam:: Yes  Dental care twice a year:: Yes  Sleep apnea or symptoms of sleep apnea:: None  Diet:: Regular (no restrictions)  Frequency of exercise:: 4-5 days/week  Taking medications regularly:: Yes  Medication side effects:: Not applicable  Additional concerns today:: No  Activities of Daily Living: no assistance needed  Home safety: lack of grab bars in the bathroom  Hearing Impairment:: no hearing concerns  PHQ-2 Score: 0  Duration of exercise:: 15-30 minutes    Fall risk:  Fallen 2 or more times in the past year?: No  Any fall with injury in the past year?: No        COGNITIVE SCREEN  1) Repeat 3 items (Leader, Season, Table)    2) Clock draw: NORMAL  3) 3 item recall: Recalls 3 objects  Results: 3 items recalled: COGNITIVE IMPAIRMENT LESS LIKELY    Mini-CogTM Copyright S Mark. Licensed by the author for use in [facility]; reprinted with permission (soob@[facility]). All rights reserved.            She has some sinus issue, drainage in the back of her throat, and coughing.    Reviewed and updated as needed this visit by clinical staff  Tobacco  Allergies  Meds  Med Hx  Surg Hx  Fam Hx  Soc Hx        Reviewed and updated as needed this visit by Provider        Social History   Substance Use Topics     Smoking status: Never Smoker     Smokeless tobacco: Never Used     Alcohol use No       If you drink alcohol do you typically have >3 drinks per day or >7 drinks per week? No                        Today's PHQ-2 Score:   PHQ-2 ( 1999 [employer]) 8/13/2018 8/10/2017   Q1: Little interest or pleasure in doing things 0 0   Q2: Feeling down, depressed or hopeless 0 0   PHQ-2 Score 0 0   Q1: Little  interest or pleasure in doing things Not at all -   Q2: Feeling down, depressed or hopeless Not at all -   PHQ-2 Score 0 -       Do you feel safe in your environment - Yes    Do you have a Health Care Directive?: No: Advance care planning was reviewed with patient; patient declined at this time.    Current providers sharing in care for this patient include:   Patient Care Team:  Deidre Lowry, NP as PCP - General (Nurse Practitioner - Adult Health)    The following health maintenance items are reviewed in Epic and correct as of today:  Health Maintenance   Topic Date Due     ADVANCE DIRECTIVE PLANNING Q5 YRS  08/16/2006     PNEUMOCOCCAL (2 of 2 - PPSV23) 09/01/2017     FALL RISK ASSESSMENT  08/10/2018     MAMMO Q1 YR  08/10/2018     PHQ-2 Q1 YR  08/10/2018     INFLUENZA VACCINE (1) 09/01/2018     COLONOSCOPY Q10 YR  08/15/2020     LIPID SCREEN Q5 YR FEMALE (SYSTEM ASSIGNED)  08/10/2022     TETANUS Q10 YR  07/05/2026     DEXA SCAN SCREENING (SYSTEM ASSIGNED)  Completed     HEPATITIS C SCREENING  Completed     BP Readings from Last 3 Encounters:   08/13/18 110/62   03/12/18 116/66   10/19/17 108/58    Wt Readings from Last 3 Encounters:   08/13/18 140 lb 6.4 oz (63.7 kg)   03/12/18 138 lb (62.6 kg)   10/19/17 138 lb (62.6 kg)                  Patient Active Problem List   Diagnosis     Chronic maxillary sinusitis     Esophageal reflux     Osteopenia     CARDIOVASCULAR SCREENING; LDL GOAL LESS THAN 130     Dry eyes, bilateral     Past Surgical History:   Procedure Laterality Date     C APPENDECTOMY       C LIGATE FALLOPIAN TUBE       C NONSPECIFIC PROCEDURE      vein stripping x 3     C TOTAL ABDOM HYSTERECTOMY      for fibroids with BSO     HC REMOVAL GALLBLADDER       HC REMOVE TONSILS/ADENOIDS,<13 Y/O       ORTHOPEDIC SURGERY      wrist 2017 george dunham        Social History   Substance Use Topics     Smoking status: Never Smoker     Smokeless tobacco: Never Used     Alcohol use No     Family History   Problem  "Relation Age of Onset     Diabetes Mother      born 1928     Hypertension Mother      Cancer Paternal Grandfather      skin     Colon Cancer Maternal Grandfather          Current Outpatient Prescriptions   Medication Sig Dispense Refill     estradiol 0.0375 MG/24HR PTWK Place 1 patch onto the skin once a week 12 patch 0     IBUPROFEN 200 MG OR TABS 2 TABLET EVERY 4 TO 6 HOURS AS NEEDED for headaches        KRILL OIL  mg capsules       NAPROXEN 500 MG OR TBEC 1 TABLET TWICE DAILY prn 30 3     nitroGLYcerin (NITROSTAT) 0.4 MG sublingual tablet Place 1 tablet (0.4 mg) under the tongue every 5 minutes as needed for chest pain If you are still having symptoms after 3 doses (15 minutes) call 911. 25 tablet 0     polyethylene glycol (MIRALAX/GLYCOLAX) powder Take 1 capful by mouth as needed for constipation       polyethylene glycol 0.4%- propylene glycol 0.3% (SYSTANE ULTRA PF) 0.4-0.3 % SOLN ophthalmic solution Place 1 drop into both eyes 3 times daily as needed for dry eyes Reported on 2/28/2017       venlafaxine (EFFEXOR-XR) 75 MG 24 hr capsule Take 1 capsule (75 mg) by mouth daily 90 capsule 1       Mammogram Screening: Patient over age 50, mutual decision to screen reflected in health maintenance.    ROS:  CONSTITUTIONAL: NEGATIVE for fever, chills, change in weight  ENT/MOUTH: NEGATIVE for ear, mouth and throat problems  RESP: NEGATIVE for significant cough or SOB  CV: NEGATIVE for chest pain, palpitations or peripheral edema  PSYCHIATRIC: NEGATIVE for changes in mood or affect  ROS otherwise negative    OBJECTIVE:   /62 (BP Location: Right arm, Patient Position: Sitting, Cuff Size: Adult Regular)  Pulse 95  Temp 97.9  F (36.6  C) (Oral)  Resp 18  Ht 5' 5.25\" (1.657 m)  Wt 140 lb 6.4 oz (63.7 kg)  SpO2 97%  BMI 23.19 kg/m2 Estimated body mass index is 23.19 kg/(m^2) as calculated from the following:    Height as of this encounter: 5' 5.25\" (1.657 m).    Weight as of this encounter: 140 lb 6.4 oz " "(63.7 kg).  EXAM:   GENERAL: healthy, alert and no distress  NECK: no adenopathy, no asymmetry, masses, or scars and thyroid normal to palpation  RESP: lungs clear to auscultation - no rales, rhonchi or wheezes  CV: regular rate and rhythm, normal S1 S2, no S3 or S4, no murmur, click or rub, no peripheral edema and peripheral pulses strong  ABDOMEN: soft, nontender, no hepatosplenomegaly, no masses and bowel sounds normal  MS: no gross musculoskeletal defects noted, no edema  PSYCH: mentation appears normal, affect normal/bright and anxious        ASSESSMENT / PLAN:       ICD-10-CM    1. Health care maintenance Z00.00 CBC with platelets     Basic metabolic panel   2. CARDIOVASCULAR SCREENING; LDL GOAL LESS THAN 130 Z13.6 ALT     Lipid Profile   3. Menopausal syndrome (hot flashes) N95.1 TSH with free T4 reflex     venlafaxine (EFFEXOR-XR) 75 MG 24 hr capsule       End of Life Planning:  Patient currently has an advanced directive: Yes.  Practitioner is supportive of decision.    COUNSELING:  Reviewed preventive health counseling, as reflected in patient instructions     Poor out come when discontinue estrogen patch in past.  Willing to try effexor and ween off patch.    BP Readings from Last 1 Encounters:   08/13/18 110/62     Estimated body mass index is 23.19 kg/(m^2) as calculated from the following:    Height as of this encounter: 5' 5.25\" (1.657 m).    Weight as of this encounter: 140 lb 6.4 oz (63.7 kg).           reports that she has never smoked. She has never used smokeless tobacco.      Appropriate preventive services were discussed with this patient, including applicable screening as appropriate for cardiovascular disease, diabetes, osteopenia/osteoporosis, and glaucoma.  As appropriate for age/gender, discussed screening for colorectal cancer, prostate cancer, breast cancer, and cervical cancer. Checklist reviewing preventive services available has been given to the patient.    Reviewed patients plan of " care and provided an AVS. The Basic Care Plan (routine screening as documented in Health Maintenance) for Nita meets the Care Plan requirement. This Care Plan has been established and reviewed with the Patient.    Counseling Resources:  ATP IV Guidelines  Pooled Cohorts Equation Calculator  Breast Cancer Risk Calculator  FRAX Risk Assessment  ICSI Preventive Guidelines  Dietary Guidelines for Americans, 2010  USDA's MyPlate  ASA Prophylaxis  Lung CA Screening    Deidre Lowry NP  [facility]

## 2018-08-13 NOTE — PATIENT INSTRUCTIONS

## 2018-08-13 NOTE — NURSING NOTE
"Vital signs:  Temp: 97.9  F (36.6  C) Temp src: Oral BP: 110/62 Pulse: 95   Resp: 18 SpO2: 97 %     Height: 5' 5.25\" (165.7 cm) Weight: 140 lb 6.4 oz (63.7 kg)  Estimated body mass index is 23.19 kg/(m^2) as calculated from the following:    Height as of this encounter: 5' 5.25\" (1.657 m).    Weight as of this encounter: 140 lb 6.4 oz (63.7 kg).        "

## 2018-08-13 NOTE — MR AVS SNAPSHOT
After Visit Summary   8/13/2018    Nita Trevizo    MRN: [id number]           Patient Information     Date Of Birth          1950        Visit Information        Provider Department      8/13/2018 9:00 AM Deidre Lowry NP [facility]        Today's Diagnoses     [facility] care maintenance    -  1    CARDIOVASCULAR SCREENING; LDL GOAL LESS THAN 130        Menopausal syndrome (hot flashes)          Care Instructions      Preventive Health Recommendations    Female Ages 65 +    Yearly exam:     See your health care provider every year in order to  o Review health changes.   o Discuss preventive care.    o Review your medicines if your doctor has prescribed any.      You no longer need a yearly Pap test unless you've had an abnormal Pap test in the past 10 years. If you have vaginal symptoms, such as bleeding or discharge, be sure to talk with your provider about a Pap test.      Every 1 to 2 years, have a mammogram.  If you are over 69, talk with your health care provider about whether or not you want to continue having screening mammograms.      Every 10 years, have a colonoscopy. Or, have a yearly FIT test (stool test). These exams will check for colon cancer.       Have a cholesterol test every 5 years, or more often if your doctor advises it.       Have a diabetes test (fasting glucose) every three years. If you are at risk for diabetes, you should have this test more often.       At age 65, have a bone density scan (DEXA) to check for osteoporosis (brittle bone disease).    Shots:    Get a flu shot each year.    Get a tetanus shot every 10 years.    Talk to your doctor about your pneumonia vaccines. There are now two you should receive - Pneumovax (PPSV 23) and Prevnar (PCV 13).    Talk to your pharmacist about the shingles vaccine.    Talk to your doctor about the hepatitis B vaccine.    Nutrition:     Eat at least 5 servings of fruits and vegetables each day.      Eat  "whole-grain bread, whole-wheat pasta and brown rice instead of white grains and rice.      Get adequate Calcium and Vitamin D.     Lifestyle    Exercise at least 150 minutes a week (30 minutes a day, 5 days a week). This will help you control your weight and prevent disease.      Limit alcohol to one drink per day.      No smoking.       Wear sunscreen to prevent skin cancer.       See your dentist twice a year for an exam and cleaning.      See your eye doctor every 1 to 2 years to screen for conditions such as glaucoma, macular degeneration and cataracts.    Effexor x 2 weeks, then stop premarin patch    Deidre Lowry CNP            Follow-ups after your visit        Your next 10 appointments already scheduled     Aug 13, 2018 10:30 AM CDT   MA SCREENING BILATERAL W/ ANNALEE with [id number]   [facility] ([facility])    [address] [facility], [address]  [facility] [address]   [phone]           Three-dimensional (3D) mammograms are available at [facility] locations in [facility], [facility], [place], [place], and [place]. -Health locations include [place] and [facility] in [place]. Benefits of 3D mammograms include: - Improved rate of cancer detection - Decreases your chance of having to go back for more tests, which means fewer: - \"False-positive\" results (This means that there is an abnormal area but it isn't cancer.) - Invasive testing procedures, such as a biopsy or surgery - Can provide clearer images of the breast if you have dense breast tissue. 3D mammography is an optional exam that anyone can have with a 2D mammogram. It doesn't replace or take the place of a 2D mammogram. 2D mammograms remain an effective screening test for all women.  Not all insurance companies cover the cost of a 3D mammogram. Check with your insurance.              Who to contact     If you have questions or need follow up information about today's clinic " "visit or your schedule please contact [facility] directly at [phone].  Normal or non-critical lab and imaging results will be communicated to you by MyChart, letter or phone within 4 business days after the clinic has received the results. If you do not hear from us within 7 days, please contact the clinic through [employer] or phone. If you have a critical or abnormal lab result, we will notify you by phone as soon as possible.  Submit refill requests through [employer] or call your pharmacy and they will forward the refill request to us. Please allow 3 business days for your refill to be completed.          Additional Information About Your Visit        [employer] Information     [employer] gives you secure access to your electronic health record. If you see a primary care provider, you can also send messages to your care team and make appointments. If you have questions, please call your primary care clinic.  If you do not have a primary care provider, please call [phone] and they will assist you.        Care EveryWhere ID     This is your Care EveryWhere ID. This could be used by other organizations to access your [place] medical records  [id number]        Your Vitals Were     Pulse Temperature Respirations Height Pulse Oximetry BMI (Body Mass Index)    95 97.9  F (36.6  C) (Oral) 18 5' 5.25\" (1.657 m) 97% 23.19 kg/m2       Blood Pressure from Last 3 Encounters:   08/13/18 110/62   03/12/18 116/66   10/19/17 108/58    Weight from Last 3 Encounters:   08/13/18 140 lb 6.4 oz (63.7 kg)   03/12/18 138 lb (62.6 kg)   10/19/17 138 lb (62.6 kg)              We Performed the Following     ALT     Basic metabolic panel     CBC with platelets     Lipid Profile     TSH with free T4 reflex          Today's Medication Changes          These changes are accurate as of 8/13/18  9:26 AM.  If you have any questions, ask your nurse or doctor.               Start taking these medicines.        Dose/Directions    " venlafaxine 75 MG 24 hr capsule   Commonly known as:  EFFEXOR-XR   Used for:  Menopausal syndrome (hot flashes)   Started by:  Deidre Lowry NP        Dose:  75 mg   Take 1 capsule (75 mg) by mouth daily   Quantity:  90 capsule   Refills:  1            Where to get your medicines      These medications were sent to [id number] - [place], [place] - [address]  [address] [facility] [address]     Phone:  [phone]     venlafaxine 75 MG 24 hr capsule                Primary Care Provider Office Phone # Fax #    Deidre Lowry [phone]       [address] [facility] [address]        Equal Access to Services     [facility]: Hadii ruben romano hadasho Soomaali, waaxda luqadaha, qaybta kaalmada adeegyada, sandra forrest . So [facility] [phone].    ATENCIÓN: Si habla español, tiene a gomes disposición servicios gratuitos de asistencia lingüística. [facility] [phone].    We comply with applicable federal civil rights laws and [place] laws. We do not discriminate on the basis of race, color, national origin, age, disability, sex, sexual orientation, or gender identity.            Thank you!     Thank you for choosing [facility]  for your care. Our goal is always to provide you with excellent care. Hearing back from our patients is one way we can continue to improve our services. Please take a few minutes to complete the written survey that you may receive in the mail after your visit with us. Thank you!             Your Updated Medication List - Protect others around you: Learn how to safely use, store and throw away your medicines at www.disposemymeds.org.          This list is accurate as of 8/13/18  9:26 AM.  Always use your most recent med list.                   Brand Name Dispense Instructions for use Diagnosis    estradiol 0.0375 MG/24HR Ptwk     12 patch    Place 1 patch onto the skin once a week    Symptomatic  menopausal or female climacteric states       ibuprofen 200 MG tablet    ADVIL/MOTRIN     2 TABLET EVERY 4 TO 6 HOURS AS NEEDED for headaches        KRILL OIL PO      250 mg capsules        naproxen 500 MG Tbec     30    1 TABLET TWICE DAILY prn    Sciatica       nitroGLYcerin 0.4 MG sublingual tablet    NITROSTAT    25 tablet    Place 1 tablet (0.4 mg) under the tongue every 5 minutes as needed for chest pain If you are still having symptoms after 3 doses (15 minutes) call 911.    Pre-op exam       polyethylene glycol powder    MIRALAX/GLYCOLAX     Take 1 capful by mouth as needed for constipation        SYSTANE ULTRA PF 0.4-0.3 % Soln ophthalmic solution   Generic drug:  polyethylene glycol 0.4%- propylene glycol 0.3%      Place 1 drop into both eyes 3 times daily as needed for dry eyes Reported on 2/28/2017    Dry eyes, bilateral       venlafaxine 75 MG 24 hr capsule    EFFEXOR-XR    90 capsule    Take 1 capsule (75 mg) by mouth daily    Menopausal syndrome (hot flashes)

## 2018-08-14 LAB
ALT SERPL W P-5'-P-CCNC: 22 U/L (ref 0–50)
ANION GAP SERPL CALCULATED.3IONS-SCNC: 9 MMOL/L (ref 3–14)
BUN SERPL-MCNC: 12 MG/DL (ref 7–30)
CALCIUM SERPL-MCNC: 8.3 MG/DL (ref 8.5–10.1)
CHLORIDE SERPL-SCNC: 108 MMOL/L (ref 94–109)
CHOLEST SERPL-MCNC: 200 MG/DL
CO2 SERPL-SCNC: 28 MMOL/L (ref 20–32)
CREAT SERPL-MCNC: 0.7 MG/DL (ref 0.52–1.04)
GFR SERPL CREATININE-BSD FRML MDRD: 84 ML/MIN/1.7M2
GLUCOSE SERPL-MCNC: 85 MG/DL (ref 70–99)
HDLC SERPL-MCNC: 70 MG/DL
LDLC SERPL CALC-MCNC: 118 MG/DL
NONHDLC SERPL-MCNC: 130 MG/DL
POTASSIUM SERPL-SCNC: 3.8 MMOL/L (ref 3.4–5.3)
SODIUM SERPL-SCNC: 145 MMOL/L (ref 133–144)
TRIGL SERPL-MCNC: 61 MG/DL
TSH SERPL DL<=0.005 MIU/L-ACNC: 0.74 MU/L (ref 0.4–4)

## 2018-08-16 ENCOUNTER — MYC MEDICAL ADVICE (OUTPATIENT)
Dept: INTERNAL MEDICINE | Facility: CLINIC | Age: 68
End: 2018-08-16

## 2018-08-16 NOTE — TELEPHONE ENCOUNTER
Please advise pt take with food, any side effects should subside.  She can try taking it at different time of day.  Does not need to take it, just thought it might make it easier to stop estrogen patch, which she should do.  Deidre Lowry CNP

## 2019-02-11 ENCOUNTER — TELEPHONE (OUTPATIENT)
Dept: INTERNAL MEDICINE | Facility: CLINIC | Age: 69
End: 2019-02-11

## 2019-02-11 ENCOUNTER — OFFICE VISIT (OUTPATIENT)
Dept: INTERNAL MEDICINE | Facility: CLINIC | Age: 69
End: 2019-02-11
Payer: COMMERCIAL

## 2019-02-11 VITALS
HEART RATE: 94 BPM | RESPIRATION RATE: 18 BRPM | HEIGHT: 65 IN | BODY MASS INDEX: 23.32 KG/M2 | SYSTOLIC BLOOD PRESSURE: 110 MMHG | WEIGHT: 140 LBS | TEMPERATURE: 98.1 F | OXYGEN SATURATION: 98 % | DIASTOLIC BLOOD PRESSURE: 70 MMHG

## 2019-02-11 DIAGNOSIS — R30.0 DYSURIA: ICD-10-CM

## 2019-02-11 DIAGNOSIS — R10.2 PELVIC PAIN IN FEMALE: Primary | ICD-10-CM

## 2019-02-11 LAB
ALBUMIN UR-MCNC: NEGATIVE MG/DL
APPEARANCE UR: CLEAR
BILIRUB UR QL STRIP: NEGATIVE
COLOR UR AUTO: YELLOW
GLUCOSE UR STRIP-MCNC: NEGATIVE MG/DL
HGB UR QL STRIP: ABNORMAL
KETONES UR STRIP-MCNC: NEGATIVE MG/DL
LEUKOCYTE ESTERASE UR QL STRIP: NEGATIVE
NITRATE UR QL: NEGATIVE
NON-SQ EPI CELLS #/AREA URNS LPF: NORMAL /LPF
PH UR STRIP: 5.5 PH (ref 5–7)
RBC #/AREA URNS AUTO: NORMAL /HPF
SOURCE: ABNORMAL
SP GR UR STRIP: <=1.005 (ref 1–1.03)
UROBILINOGEN UR STRIP-ACNC: 0.2 EU/DL (ref 0.2–1)
WBC #/AREA URNS AUTO: NORMAL /HPF

## 2019-02-11 PROCEDURE — 99213 OFFICE O/P EST LOW 20 MIN: CPT | Performed by: NURSE PRACTITIONER

## 2019-02-11 PROCEDURE — 81001 URINALYSIS AUTO W/SCOPE: CPT | Performed by: NURSE PRACTITIONER

## 2019-02-11 RX ORDER — CHOLECALCIFEROL (VITAMIN D3) 50 MCG
1 TABLET ORAL DAILY
COMMUNITY
End: 2020-01-10

## 2019-02-11 ASSESSMENT — MIFFLIN-ST. JEOR: SCORE: 1174.88

## 2019-02-11 NOTE — PROGRESS NOTES
SUBJECTIVE:   Nita Trevizo is a 68 year old female who presents to clinic today for the following health issues:  Patient here for abdominal pain, all the way across.    Abdominal Pain      Duration: 2 days    Description (location/character/radiation): pelvic, RLQ, LLQ intermittent pain and bloating       Associated flank pain: None    Intensity:  mild    Accompanying signs and symptoms:        Fever/Chills: no        Gas/Bloating: YES       Nausea/vomitting: no        Diarrhea: YES- h/o IBS       Dysuria or Hematuria: no     History (previous similar pain/trauma/previous testing): no    Precipitating or alleviating factors:       Pain worse with eating/BM/urination: no       Pain relieved by BM: no     Therapies tried and outcome: None    LMP:  not applicable          Problem list and histories reviewed & adjusted, as indicated.  Additional history: as documented    Patient Active Problem List   Diagnosis     Chronic maxillary sinusitis     Esophageal reflux     Osteopenia     CARDIOVASCULAR SCREENING; LDL GOAL LESS THAN 130     Dry eyes, bilateral     Past Surgical History:   Procedure Laterality Date     C APPENDECTOMY       C LIGATE FALLOPIAN TUBE       C NONSPECIFIC PROCEDURE      vein stripping x 3     C TOTAL ABDOM HYSTERECTOMY      for fibroids with BSO     HC REMOVAL GALLBLADDER       HC REMOVE TONSILS/ADENOIDS,<13 Y/O       ORTHOPEDIC SURGERY      wrist 2017 [facility]        Social History     Tobacco Use     Smoking status: Never Smoker     Smokeless tobacco: Never Used   Substance Use Topics     Alcohol use: No     Alcohol/week: 0.0 oz     Family History   Problem Relation Age of Onset     Diabetes Mother         born 1928     Hypertension Mother      Cancer Paternal Grandfather         skin     Colon Cancer Maternal Grandfather          Current Outpatient Medications   Medication Sig Dispense Refill     IBUPROFEN 200 MG OR TABS 2 TABLET EVERY 4 TO 6 HOURS AS NEEDED for headaches        KRILL OIL  " mg capsules       NAPROXEN 500 MG OR TBEC 1 TABLET TWICE DAILY prn 30 3     nitroGLYcerin (NITROSTAT) 0.4 MG sublingual tablet Place 1 tablet (0.4 mg) under the tongue every 5 minutes as needed for chest pain If you are still having symptoms after 3 doses (15 minutes) call 911. 25 tablet 0     polyethylene glycol (MIRALAX/GLYCOLAX) powder Take 1 capful by mouth as needed for constipation       polyethylene glycol 0.4%- propylene glycol 0.3% (SYSTANE ULTRA PF) 0.4-0.3 % SOLN ophthalmic solution Place 1 drop into both eyes 3 times daily as needed for dry eyes Reported on 2/28/2017       vitamin D3 (CHOLECALCIFEROL) 2000 units tablet Take 1 tablet by mouth daily       BP Readings from Last 3 Encounters:   02/11/19 110/70   08/13/18 110/62   03/12/18 116/66    Wt Readings from Last 3 Encounters:   02/11/19 63.5 kg (140 lb)   08/13/18 63.7 kg (140 lb 6.4 oz)   03/12/18 62.6 kg (138 lb)                    Reviewed and updated as needed this visit by clinical staff  Tobacco  Allergies  Meds  Med Hx  Surg Hx  Fam Hx  Soc Hx      Reviewed and updated as needed this visit by Provider         ROS:  Constitutional, HEENT, cardiovascular, pulmonary, gi and gu systems are negative, except as otherwise noted.    OBJECTIVE:     /70 (BP Location: Left arm, Patient Position: Sitting, Cuff Size: Adult Regular)   Pulse 94   Temp 98.1  F (36.7  C) (Oral)   Resp 18   Ht 1.657 m (5' 5.25\")   Wt 63.5 kg (140 lb)   SpO2 98%   BMI 23.12 kg/m    Body mass index is 23.12 kg/m .  GENERAL: healthy, alert and no distress  NECK: no adenopathy, no asymmetry, masses, or scars and thyroid normal to palpation  RESP: lungs clear to auscultation - no rales, rhonchi or wheezes  CV: regular rate and rhythm, normal S1 S2, no S3 or S4, no murmur, click or rub, no peripheral edema and peripheral pulses strong  ABDOMEN: soft, nontender, no hepatosplenomegaly, no masses and bowel sounds normal        ASSESSMENT/PLAN:               " ICD-10-CM    1. Pelvic pain in female R10.2    2. Dysuria R30.0 UA reflex to Microscopic     Urine Microscopic       Daily miralax  Consider OAB tx.    Deidre Lowry NP  [facility]

## 2019-02-11 NOTE — TELEPHONE ENCOUNTER
"Nita Trevizo is a 68 year old female  who calls with abdominal pain.    NURSING ASSESSMENT:  The pain began 5-7 days ago, worse the past 2 nights.    Pain scale (0-10): right now 2/10 but has been as high as 10/10 for brief periods.  Always present to some degree.  LMP  was  Total abdominal hysterectomy age 41s  The pain is described as aching, burning and cramping and is located suprapubic, which is without radiation.  Symptom associated with the abdominal pain: none. Denies fever, nausea, vomiting, black or burgundy stools, dysuria, frequency.  Patient has had previous abdominal surgery, including appendectomy, cholecystectomy and CHANTAL with BSO.  Pain is aggravated just before having a bowel movement or when laying on her left side, and relieved by pressing on area and slight relief after bowel movements.  Patient with history of IBS so does vacillate between constipation and diarrhea.  Allergies:   Allergies   Allergen Reactions     Amides      Neosporin \"rash\"     Codeine      \"chest pain\"       MEDICATIONS:   Taking medication(s) as prescribed? Yes  Taking over the counter medication(s)? No  Any medication side effects? No significant side effects    Any barriers to taking medication(s) as prescribed?  No  Medication(s) improving/managing symptoms?  N/A  Medication reconciliation completed: No    NURSING PLAN: Nursing advice to patient Needs to be seen in clinic but if pain worsens significantly to head to ER.    RECOMMENDED DISPOSITION:  To office now, scheduled with primary care provider.  Patient driving from [place].  Will comply with recommendation: Yes  If further questions/concerns or if symptoms do not improve, worsen or new symptoms develop, call your PCP or [facility] as soon as possible.    Guideline used:  Telephone Triage Protocols for Nurses, Fifth Edition, Denise Lopez RN    "

## 2019-02-11 NOTE — NURSING NOTE
"/70 (BP Location: Left arm, Patient Position: Sitting, Cuff Size: Adult Regular)   Pulse 94   Temp 98.1  F (36.7  C) (Oral)   Resp 18   Ht 1.657 m (5' 5.25\")   Wt 63.5 kg (140 lb)   SpO2 98%   BMI 23.12 kg/m    Patient here for abdominal pain. All the way across  "

## 2019-08-19 ENCOUNTER — HOSPITAL ENCOUNTER (OUTPATIENT)
Dept: MAMMOGRAPHY | Facility: CLINIC | Age: 69
Discharge: HOME | End: 2019-08-19
Attending: INTERNAL MEDICINE | Admitting: INTERNAL MEDICINE
Payer: COMMERCIAL

## 2019-08-19 ENCOUNTER — OFFICE VISIT (OUTPATIENT)
Dept: INTERNAL MEDICINE | Facility: CLINIC | Age: 69
End: 2019-08-19
Payer: COMMERCIAL

## 2019-08-19 VITALS
DIASTOLIC BLOOD PRESSURE: 68 MMHG | SYSTOLIC BLOOD PRESSURE: 102 MMHG | BODY MASS INDEX: 22.16 KG/M2 | OXYGEN SATURATION: 99 % | WEIGHT: 133 LBS | TEMPERATURE: 98 F | RESPIRATION RATE: 12 BRPM | HEART RATE: 94 BPM | HEIGHT: 65 IN

## 2019-08-19 DIAGNOSIS — Z12.31 VISIT FOR SCREENING MAMMOGRAM: ICD-10-CM

## 2019-08-19 DIAGNOSIS — R30.0 DYSURIA: ICD-10-CM

## 2019-08-19 DIAGNOSIS — R10.2 VAGINAL PAIN: ICD-10-CM

## 2019-08-19 DIAGNOSIS — Z00.00 ROUTINE HISTORY AND PHYSICAL EXAMINATION OF ADULT: Primary | ICD-10-CM

## 2019-08-19 LAB — HGB BLD-MCNC: 14.3 G/DL (ref 11.7–15.7)

## 2019-08-19 PROCEDURE — 84443 ASSAY THYROID STIM HORMONE: CPT | Performed by: INTERNAL MEDICINE

## 2019-08-19 PROCEDURE — 36415 COLL VENOUS BLD VENIPUNCTURE: CPT | Performed by: INTERNAL MEDICINE

## 2019-08-19 PROCEDURE — 77063 BREAST TOMOSYNTHESIS BI: CPT

## 2019-08-19 PROCEDURE — 85018 HEMOGLOBIN: CPT | Performed by: INTERNAL MEDICINE

## 2019-08-19 PROCEDURE — 99397 PER PM REEVAL EST PAT 65+ YR: CPT | Performed by: INTERNAL MEDICINE

## 2019-08-19 PROCEDURE — 80053 COMPREHEN METABOLIC PANEL: CPT | Performed by: INTERNAL MEDICINE

## 2019-08-19 PROCEDURE — 80061 LIPID PANEL: CPT | Performed by: INTERNAL MEDICINE

## 2019-08-19 RX ORDER — CYCLOSPORINE 0.5 MG/ML
1 EMULSION OPHTHALMIC 2 TIMES DAILY
COMMUNITY
End: 2020-01-13

## 2019-08-19 ASSESSMENT — MIFFLIN-ST. JEOR: SCORE: 1138.12

## 2019-08-19 ASSESSMENT — ENCOUNTER SYMPTOMS
SORE THROAT: 0
HEMATURIA: 0
MYALGIAS: 0
FREQUENCY: 0
NAUSEA: 0
HEMATOCHEZIA: 0
ABDOMINAL PAIN: 0
FEVER: 0
HEARTBURN: 0
COUGH: 0
WEAKNESS: 0
JOINT SWELLING: 0
SHORTNESS OF BREATH: 0
PARESTHESIAS: 0
BREAST MASS: 0
CONSTIPATION: 0
DIARRHEA: 1
DIARRHEA: 0
PALPITATIONS: 1
EYE PAIN: 0
NERVOUS/ANXIOUS: 0
ARTHRALGIAS: 0
DIZZINESS: 0
PALPITATIONS: 0
DYSURIA: 0
EYE PAIN: 1
CHILLS: 0
HEADACHES: 0

## 2019-08-19 ASSESSMENT — ACTIVITIES OF DAILY LIVING (ADL): CURRENT_FUNCTION: NO ASSISTANCE NEEDED

## 2019-08-19 NOTE — NURSING NOTE
"/68   Pulse 94   Temp 98  F (36.7  C) (Oral)   Resp 12   Ht 1.657 m (5' 5.25\")   Wt 60.3 kg (133 lb)   SpO2 99%   Breastfeeding? No   BMI 21.96 kg/m      "

## 2019-08-20 LAB
ALBUMIN SERPL-MCNC: 3.8 G/DL (ref 3.4–5)
ALP SERPL-CCNC: 73 U/L (ref 40–150)
ALT SERPL W P-5'-P-CCNC: 38 U/L (ref 0–50)
ANION GAP SERPL CALCULATED.3IONS-SCNC: 9 MMOL/L (ref 3–14)
AST SERPL W P-5'-P-CCNC: 23 U/L (ref 0–45)
BILIRUB SERPL-MCNC: 0.3 MG/DL (ref 0.2–1.3)
BUN SERPL-MCNC: 15 MG/DL (ref 7–30)
CALCIUM SERPL-MCNC: 9 MG/DL (ref 8.5–10.1)
CHLORIDE SERPL-SCNC: 108 MMOL/L (ref 94–109)
CHOLEST SERPL-MCNC: 203 MG/DL
CO2 SERPL-SCNC: 26 MMOL/L (ref 20–32)
CREAT SERPL-MCNC: 0.58 MG/DL (ref 0.52–1.04)
GFR SERPL CREATININE-BSD FRML MDRD: >90 ML/MIN/{1.73_M2}
GLUCOSE SERPL-MCNC: 87 MG/DL (ref 70–99)
HDLC SERPL-MCNC: 66 MG/DL
LDLC SERPL CALC-MCNC: 114 MG/DL
NONHDLC SERPL-MCNC: 137 MG/DL
POTASSIUM SERPL-SCNC: 4.5 MMOL/L (ref 3.4–5.3)
PROT SERPL-MCNC: 7.3 G/DL (ref 6.8–8.8)
SODIUM SERPL-SCNC: 143 MMOL/L (ref 133–144)
TRIGL SERPL-MCNC: 115 MG/DL
TSH SERPL DL<=0.005 MIU/L-ACNC: 0.94 MU/L (ref 0.4–4)

## 2019-09-09 ENCOUNTER — OFFICE VISIT (OUTPATIENT)
Dept: OBGYN | Facility: CLINIC | Age: 69
End: 2019-09-09
Payer: COMMERCIAL

## 2019-09-09 VITALS — DIASTOLIC BLOOD PRESSURE: 70 MMHG | WEIGHT: 135 LBS | SYSTOLIC BLOOD PRESSURE: 110 MMHG | BODY MASS INDEX: 22.29 KG/M2

## 2019-09-09 DIAGNOSIS — N89.8 VAGINAL LESION: Primary | ICD-10-CM

## 2019-09-09 DIAGNOSIS — N90.89 VULVAR LESION: ICD-10-CM

## 2019-09-09 PROCEDURE — 99203 OFFICE O/P NEW LOW 30 MIN: CPT | Performed by: OBSTETRICS & GYNECOLOGY

## 2019-09-09 RX ORDER — CLOBETASOL PROPIONATE 0.5 MG/G
OINTMENT TOPICAL 2 TIMES DAILY
Qty: 15 G | Refills: 3 | Status: SHIPPED | OUTPATIENT
Start: 2019-09-09 | End: 2020-01-13

## 2019-09-09 RX ORDER — LIDOCAINE HYDROCHLORIDE 20 MG/ML
JELLY TOPICAL PRN
Qty: 30 ML | Refills: 3 | Status: SHIPPED | OUTPATIENT
Start: 2019-09-09 | End: 2020-01-10

## 2019-09-09 NOTE — NURSING NOTE
"Chief Complaint   Patient presents with     Consult     vaginal dryness and sensitivity. Patient is not sexually active and states that her vulva is very tender, when she is wiping.        Initial /70 (BP Location: Right arm, Patient Position: Chair, Cuff Size: Adult Regular)   Wt 61.2 kg (135 lb)   BMI 22.29 kg/m   Estimated body mass index is 22.29 kg/m  as calculated from the following:    Height as of 19: 1.657 m (5' 5.25\").    Weight as of this encounter: 61.2 kg (135 lb).  BP completed using cuff size: regular    Questioned patient about current smoking habits.  Pt. has never smoked.          The following HM Due: NONE    Damaso Sidhu CMA               "

## 2019-09-09 NOTE — PROGRESS NOTES
OB/Gyn Consultation:    Nita Trevizo was sent to me for consultation by Dr Burns for evaluation of vaginal pain and dryness.      SUBJECTIVE:                                                   CC:  Patient presents with:  Consult: vaginal dryness and sensitivity. Patient is not sexually active and states that her vulva is very tender, when she is wiping.       HPI:  Nita Trevizo is a 69 year old  with months of vaginal pain and dryness.   She had been on premarin cream for years, then went to the hormone patch which she stopped last year.   For the past several months the vagina has been hurting, mostly irritated when she wipes.  No intercourse x4-5 years due to medication her  is taking.    ROS: 10 point ROS negative other than as listed above in HPI.    Gyn History:  No LMP recorded. Patient has had a hysterectomy.       PMH, PSH, Soc Hx, Fam Hx, Meds, and allergies reviewed in Epic.    OBJECTIVE:     /70 (BP Location: Right arm, Patient Position: Chair, Cuff Size: Adult Regular)   Wt 61.2 kg (135 lb)   BMI 22.29 kg/m      Gen: Healthy appearing female, no acute distress, comfortable, appears stated age, well groomed  HENT: No scleral injection or icterus  CV: Regular rate  Resp: Normal work of breathing, no cough  GI: Abdomen soft, non-tender. No masses, organomegaly  Skin: No suspicious lesions or rashes  Psychiatric: mentation appears normal and affect bright  : Normal external female genitalia c/w menopausal status.  No external lesions, normal hair distribution.   The posterior introitus is slightly fused and behind the area of fusion it contains a small 1cm x 2cm whitish raised ulcerated lesion which is painful to the touch, not bleeding      ASSESSMENT/PLAN:                                                      1. Vaginal lesion, concern for LS/SCC  Discussed biopsying the area to exclude SCC or other cell abnormality.  She is going on a [place] cruise in a couple of  weeks for her 30th anniversary, would like to wait until after that time.  rx for clobetasol given, if it improves the ulceration there may be no need for a biopsy.  Due to the fusion of the posterior introitus it could be due to presence of LS and this was discussed in brief.  Also gave rx for lidocaine jelly for pain and premarin as it may also help with the introital stenosis.  - lidocaine (XYLOCAINE) 2 % external gel; Apply topically as needed for moderate pain  Dispense: 30 mL; Refill: 3  - clobetasol (TEMOVATE) 0.05 % external ointment; Apply topically 2 times daily  Dispense: 15 g; Refill: 3  - conjugated estrogens (PREMARIN) 0.625 MG/GM vaginal cream; Place 0.5 g vaginally twice a week  Dispense: 4 g; Refill: 3    RTC 4-6 weeks for vulvar/vaginal biopsy.     Fiona Bertrand MD, MPH  Obstetrics and Gynecology

## 2019-09-11 ENCOUNTER — TELEPHONE (OUTPATIENT)
Dept: OBGYN | Facility: CLINIC | Age: 69
End: 2019-09-11

## 2019-09-11 DIAGNOSIS — N89.8 VAGINAL LESION: Primary | ICD-10-CM

## 2019-09-11 RX ORDER — LIDOCAINE HYDROCHLORIDE 20 MG/ML
JELLY TOPICAL PRN
Qty: 30 ML | Refills: 3 | Status: SHIPPED | OUTPATIENT
Start: 2019-09-11 | End: 2020-01-10

## 2019-09-11 NOTE — TELEPHONE ENCOUNTER
Fax received from [facility] pharmacy stating the external lidocaine 2% gel is not covered by the pt's insurance plan. Please advise if you would like to send in an alternative or start a PA.          Su Vences RN

## 2019-09-11 NOTE — TELEPHONE ENCOUNTER
Will start PA. Usually the pharmacy recommends sending in any other medication the provider thinks might be an acceptable alternative, as they don't usually get a list of covered alternatives. Okay to send different formulation as you desire.        Su Vences RN

## 2019-09-11 NOTE — TELEPHONE ENCOUNTER
Could we try a different formulation of lidocaine?  Also maybe we should start a prior auth just in case.    Fiona Bertrand MD, MPH  [facility]

## 2019-10-21 ENCOUNTER — OFFICE VISIT (OUTPATIENT)
Dept: OBGYN | Facility: CLINIC | Age: 69
End: 2019-10-21
Payer: COMMERCIAL

## 2019-10-21 VITALS
WEIGHT: 135 LBS | BODY MASS INDEX: 22.49 KG/M2 | HEIGHT: 65 IN | SYSTOLIC BLOOD PRESSURE: 120 MMHG | DIASTOLIC BLOOD PRESSURE: 80 MMHG

## 2019-10-21 DIAGNOSIS — N90.89 VULVAR LESION: Primary | ICD-10-CM

## 2019-10-21 PROCEDURE — 56605 BIOPSY OF VULVA/PERINEUM: CPT | Performed by: OBSTETRICS & GYNECOLOGY

## 2019-10-21 PROCEDURE — 88305 TISSUE EXAM BY PATHOLOGIST: CPT | Performed by: OBSTETRICS & GYNECOLOGY

## 2019-10-21 PROCEDURE — 88305 TISSUE EXAM BY PATHOLOGIST: CPT | Mod: 26 | Performed by: OBSTETRICS & GYNECOLOGY

## 2019-10-21 ASSESSMENT — MIFFLIN-ST. JEOR: SCORE: 1147.2

## 2019-10-21 NOTE — NURSING NOTE
"Chief Complaint   Patient presents with     Vaginal Problem     F/U Lichen sclerosis       Initial /80 (BP Location: Right arm, Patient Position: Chair, Cuff Size: Adult Regular)   Ht 1.657 m (5' 5.25\")   Wt 61.2 kg (135 lb)   Breastfeeding? No   BMI 22.29 kg/m   Estimated body mass index is 22.29 kg/m  as calculated from the following:    Height as of this encounter: 1.657 m (5' 5.25\").    Weight as of this encounter: 61.2 kg (135 lb).  BP completed using cuff size: regular    Questioned patient about current smoking habits.  Pt. has never smoked.          The following HM Due: NONE  Justa Arana CMA      "

## 2019-10-21 NOTE — PROGRESS NOTES
INDICATIONS:                                                    Nita Trevizo is a 69 year old female that was found to have a mildline vulvar/vaginal lesion on the posterior introitus just inside the introitus.  She was previously noted to have the lesion but deferred biopsy at the time due to planning a vacation.  Has been using lidocaine jelly on the spot, also clobetasol every day, a very small amount.  Never filled the premarin because it was too expensive.    Is a pregnancy test required: No.  Was a consent obtained?  Yes    PROCEDURE:                                                      The area was prepped with Betadine. the area was injected with 4 cc's of 1%  Lidocaine without epinephrine. After adequate anesthesia was reached, the skin was flattened with one hand and a sample of the abnormal area was made with a 4 mm punch biopsy instrument.  The skin disc was elevated and scissors used to cut the underlying tissue.  The specimen was placed in formalin and sent to pathology for evaluation.  Pressure was applied to the biopsy site to control bleeding. Silver nitrate was applied.  Hemostasis was adequate.     POST PROCEDURE:                                                      She was observed.  She tolerated the procedure well with minimal discomfort. There were no complications. Patient was discharged in stable condition.    Hot Sitz bath BID, no douching, tampons or intercourse.  Call if bleeding, swelling, pain, redness or fever occur.  Patient will be called with pathology results.    Fiona Bertrand MD    1. Vulvar lesion  Will call in 7-10 days with results from biopsy.  If consistent with LS, continue clobetasol and follow up annually.  If SCC or other process, may need further work up or surgical mgmt.   - Surgical pathology exam  - BIOPSY VULVA/PERINEUM, ONE LESION     Fiona Bertrand MD, MPH  [facility]

## 2019-10-23 LAB — COPATH REPORT: NORMAL

## 2019-11-03 ENCOUNTER — HEALTH MAINTENANCE LETTER (OUTPATIENT)
Age: 69
End: 2019-11-03

## 2019-12-04 ENCOUNTER — MYC MEDICAL ADVICE (OUTPATIENT)
Dept: INTERNAL MEDICINE | Facility: CLINIC | Age: 69
End: 2019-12-04

## 2020-01-02 ENCOUNTER — TRANSFERRED RECORDS (OUTPATIENT)
Dept: HEALTH INFORMATION MANAGEMENT | Facility: CLINIC | Age: 70
End: 2020-01-02

## 2020-01-09 ENCOUNTER — APPOINTMENT (OUTPATIENT)
Dept: CT IMAGING | Facility: CLINIC | Age: 70
End: 2020-01-09
Attending: PHYSICIAN ASSISTANT
Payer: COMMERCIAL

## 2020-01-09 ENCOUNTER — HOSPITAL ENCOUNTER (EMERGENCY)
Facility: CLINIC | Age: 70
Discharge: HOME | End: 2020-01-09
Attending: PHYSICIAN ASSISTANT | Admitting: PHYSICIAN ASSISTANT
Payer: COMMERCIAL

## 2020-01-09 VITALS
SYSTOLIC BLOOD PRESSURE: 135 MMHG | DIASTOLIC BLOOD PRESSURE: 58 MMHG | OXYGEN SATURATION: 97 % | RESPIRATION RATE: 16 BRPM | HEART RATE: 85 BPM | TEMPERATURE: 97.9 F

## 2020-01-09 DIAGNOSIS — R10.32 ABDOMINAL PAIN, LEFT LOWER QUADRANT: ICD-10-CM

## 2020-01-09 LAB
ALBUMIN SERPL-MCNC: 4 G/DL (ref 3.4–5)
ALBUMIN UR-MCNC: NEGATIVE MG/DL
ALP SERPL-CCNC: 73 U/L (ref 40–150)
ALT SERPL W P-5'-P-CCNC: 75 U/L (ref 0–50)
ANION GAP SERPL CALCULATED.3IONS-SCNC: 3 MMOL/L (ref 3–14)
APPEARANCE UR: CLEAR
AST SERPL W P-5'-P-CCNC: 13 U/L (ref 0–45)
BASOPHILS # BLD AUTO: 0 10E9/L (ref 0–0.2)
BASOPHILS NFR BLD AUTO: 0.4 %
BILIRUB SERPL-MCNC: 0.5 MG/DL (ref 0.2–1.3)
BILIRUB UR QL STRIP: NEGATIVE
BUN SERPL-MCNC: 26 MG/DL (ref 7–30)
CALCIUM SERPL-MCNC: 9.2 MG/DL (ref 8.5–10.1)
CHLORIDE SERPL-SCNC: 106 MMOL/L (ref 94–109)
CO2 SERPL-SCNC: 31 MMOL/L (ref 20–32)
COLOR UR AUTO: YELLOW
CREAT SERPL-MCNC: 0.64 MG/DL (ref 0.52–1.04)
DIFFERENTIAL METHOD BLD: ABNORMAL
EOSINOPHIL # BLD AUTO: 0.1 10E9/L (ref 0–0.7)
EOSINOPHIL NFR BLD AUTO: 1.6 %
ERYTHROCYTE [DISTWIDTH] IN BLOOD BY AUTOMATED COUNT: 13.2 % (ref 10–15)
GFR SERPL CREATININE-BSD FRML MDRD: >90 ML/MIN/{1.73_M2}
GLUCOSE SERPL-MCNC: 91 MG/DL (ref 70–99)
GLUCOSE UR STRIP-MCNC: NEGATIVE MG/DL
HCG UR QL: NORMAL
HCT VFR BLD AUTO: 48.6 % (ref 35–47)
HGB BLD-MCNC: 15.3 G/DL (ref 11.7–15.7)
HGB UR QL STRIP: ABNORMAL
IMM GRANULOCYTES # BLD: 0 10E9/L (ref 0–0.4)
IMM GRANULOCYTES NFR BLD: 0.4 %
KETONES UR STRIP-MCNC: NEGATIVE MG/DL
LACTATE BLD-SCNC: 0.6 MMOL/L (ref 0.7–2)
LEUKOCYTE ESTERASE UR QL STRIP: ABNORMAL
LIPASE SERPL-CCNC: 330 U/L (ref 73–393)
LYMPHOCYTES # BLD AUTO: 1.3 10E9/L (ref 0.8–5.3)
LYMPHOCYTES NFR BLD AUTO: 14.8 %
MCH RBC QN AUTO: 27.9 PG (ref 26.5–33)
MCHC RBC AUTO-ENTMCNC: 31.5 G/DL (ref 31.5–36.5)
MCV RBC AUTO: 89 FL (ref 78–100)
MONOCYTES # BLD AUTO: 0.7 10E9/L (ref 0–1.3)
MONOCYTES NFR BLD AUTO: 7.2 %
MUCOUS THREADS #/AREA URNS LPF: PRESENT /LPF
NEUTROPHILS # BLD AUTO: 6.8 10E9/L (ref 1.6–8.3)
NEUTROPHILS NFR BLD AUTO: 75.6 %
NITRATE UR QL: NEGATIVE
NRBC # BLD AUTO: 0 10*3/UL
NRBC BLD AUTO-RTO: 0 /100
PH UR STRIP: 5.5 PH (ref 5–7)
PLATELET # BLD AUTO: 237 10E9/L (ref 150–450)
POTASSIUM SERPL-SCNC: 3.8 MMOL/L (ref 3.4–5.3)
PROT SERPL-MCNC: 8 G/DL (ref 6.8–8.8)
RBC # BLD AUTO: 5.49 10E12/L (ref 3.8–5.2)
RBC #/AREA URNS AUTO: 2 /HPF (ref 0–2)
SODIUM SERPL-SCNC: 140 MMOL/L (ref 133–144)
SOURCE: ABNORMAL
SP GR UR STRIP: 1.02 (ref 1–1.03)
SQUAMOUS #/AREA URNS AUTO: 2 /HPF (ref 0–1)
UROBILINOGEN UR STRIP-MCNC: NORMAL MG/DL (ref 0–2)
WBC # BLD AUTO: 9 10E9/L (ref 4–11)
WBC #/AREA URNS AUTO: 4 /HPF (ref 0–5)

## 2020-01-09 PROCEDURE — 83605 ASSAY OF LACTIC ACID: CPT | Performed by: PHYSICIAN ASSISTANT

## 2020-01-09 PROCEDURE — 80053 COMPREHEN METABOLIC PANEL: CPT | Performed by: PHYSICIAN ASSISTANT

## 2020-01-09 PROCEDURE — 96374 THER/PROPH/DIAG INJ IV PUSH: CPT | Mod: 59

## 2020-01-09 PROCEDURE — 83690 ASSAY OF LIPASE: CPT | Performed by: PHYSICIAN ASSISTANT

## 2020-01-09 PROCEDURE — 74177 CT ABD & PELVIS W/CONTRAST: CPT

## 2020-01-09 PROCEDURE — 96361 HYDRATE IV INFUSION ADD-ON: CPT

## 2020-01-09 PROCEDURE — 25000128 H RX IP 250 OP 636: Performed by: PHYSICIAN ASSISTANT

## 2020-01-09 PROCEDURE — 99285 EMERGENCY DEPT VISIT HI MDM: CPT | Mod: 25

## 2020-01-09 PROCEDURE — 81001 URINALYSIS AUTO W/SCOPE: CPT | Performed by: EMERGENCY MEDICINE

## 2020-01-09 PROCEDURE — 25800030 ZZH RX IP 258 OP 636: Performed by: PHYSICIAN ASSISTANT

## 2020-01-09 PROCEDURE — 85025 COMPLETE CBC W/AUTO DIFF WBC: CPT | Performed by: PHYSICIAN ASSISTANT

## 2020-01-09 RX ORDER — KETOROLAC TROMETHAMINE 15 MG/ML
15 INJECTION, SOLUTION INTRAMUSCULAR; INTRAVENOUS ONCE
Status: COMPLETED | OUTPATIENT
Start: 2020-01-09 | End: 2020-01-09

## 2020-01-09 RX ORDER — NITROGLYCERIN 0.4 MG/1
0.4 TABLET SUBLINGUAL EVERY 5 MIN PRN
Qty: 10 TABLET | Refills: 0 | Status: SHIPPED | OUTPATIENT
Start: 2020-01-09 | End: 2020-01-10

## 2020-01-09 RX ORDER — HYDROCODONE BITARTRATE AND ACETAMINOPHEN 5; 325 MG/1; MG/1
1 TABLET ORAL EVERY 4 HOURS PRN
Qty: 15 TABLET | Refills: 0 | Status: SHIPPED | OUTPATIENT
Start: 2020-01-09 | End: 2020-05-28

## 2020-01-09 RX ORDER — IOPAMIDOL 755 MG/ML
68 INJECTION, SOLUTION INTRAVASCULAR ONCE
Status: COMPLETED | OUTPATIENT
Start: 2020-01-09 | End: 2020-01-09

## 2020-01-09 RX ADMIN — IOPAMIDOL 68 ML: 755 INJECTION, SOLUTION INTRAVENOUS at 12:55

## 2020-01-09 RX ADMIN — SODIUM CHLORIDE 1000 ML: 9 INJECTION, SOLUTION INTRAVENOUS at 12:32

## 2020-01-09 RX ADMIN — KETOROLAC TROMETHAMINE 15 MG: 15 INJECTION, SOLUTION INTRAMUSCULAR; INTRAVENOUS at 12:42

## 2020-01-09 ASSESSMENT — ENCOUNTER SYMPTOMS
NAUSEA: 0
FREQUENCY: 1
ABDOMINAL PAIN: 1
DIARRHEA: 1
DYSURIA: 0
VOMITING: 0

## 2020-01-09 NOTE — ED TRIAGE NOTES
Pt c/o left sided abdominal pain with associated lower abdominal cramping for the past 3 days, pt c/o increased cramping when urinating but denies actual dysuria. Pt has a hx of IBS but states this feels different. Pt alert, oriented x3 ABCs intact

## 2020-01-09 NOTE — ED PROVIDER NOTES
History     Chief Complaint:  Abdominal Pain       HPI   Nita Trevizo is a 69 year old female who presents with abdominal pain. The patient developed left upper abdominal pain and cramping lower abdominal pain 3 days ago. She reports having acid reflux as well and her abdominal pain is worse when she eats. She took miralax yesterday and had several episodes of diarrhea. In addition, the patient also reports having increased frequency and urgency today but denies any dysuria. She denies having nausea or vomiting.     Allergies:  Amides  Codeine     Medications:    Medications reviewed. No current medications.     Past Medical History:    Arthritis   Disorder of bone and cartilage, unspecified   Esophageal reflux   Osteopenia     Past Surgical History:    Appendectomy   Ligate fallopian tube  Vein stripping  Total abdominal hysterectomy   Cholecystectomy   Tonsillectomy and adenoidectomy   wrist surgery     Family History:    Diabetes     Social History:  The patient was accompanied to the ED by .  Smoking Status: Never Smoker  Smokeless Tobacco: Never Used  Alcohol Use: No  Drug Use: No  PCP: Andrae Burns       Review of Systems   Gastrointestinal: Positive for abdominal pain and diarrhea. Negative for nausea and vomiting.   Genitourinary: Positive for frequency and urgency. Negative for dysuria.   All other systems reviewed and are negative.        Physical Exam     Patient Vitals for the past 24 hrs:   BP Temp Temp src Pulse Heart Rate Resp SpO2   01/09/20 1245 -- -- -- -- -- -- 99 %   01/09/20 1237 -- -- -- -- -- -- 99 %   01/09/20 1125 132/71 97.9  F (36.6  C) Oral 101 101 16 99 %        Physical Exam  Constitutional: well appearing, no acute distress.   Head: No external signs of trauma noted to head or face.   Eyes: Pupils are equal, round, and reactive to light. Conjunctiva normal. No scleral icterus.   ENT: MMM. Normal voice.   Neck: normal ROM   Cardiovascular: Normal rate, regular  rhythm, and intact distal pulses.    Respiratory: Effort normal. No respiratory distress. Lungs clear to auscultation bilaterally.   GI: Soft. Left sided and suprapubic abdominal tenderness without rebound or guarding.   Musculoskeletal: No deformities appreciated. Normal ROM. No edema noted.  Neurological: Alert and Oriented x 3. Speech normal. Moves all extremities equally.   Psychiatric: Appropriate mood, affect, and behavior.   Skin: Skin is warm and dry. No jaundice.        Emergency Department Course     Imaging:  Radiology findings were communicated with the patient who voiced understanding of the findings.    CT Abdomen Pelvis w Contrast  Preliminary Result  IMPRESSION:  1. Distal colonic diverticulosis noted, but no convincing visible  diverticulitis at this time. No abscess.  2. Indeterminate hypodensity within the right kidney, too small for  characterization. This may be a complex cyst. Nonurgent renal MRI  should be considered for further assessment.  3. Nonobstructing stone within the right kidney.  Reading per radiology     Laboratory:  Laboratory findings were communicated with the patient who voiced understanding of the findings.    CBC:  WBC 9.0, HGB 15.3, , o/w WNL     CMP: ALT 75 (H), o/w WNL (Creatinine: 0.64)     Lipase: 330     Lactic Acid whole blood (1233): 0.6 (L)      UA with micro: Urine Blood trace (A) Leukocyte esterase urine trace (A) Squamous epithelial/HPF urine 2 (H) Mucous urine present (A)  o/w wnl/negative       Interventions:  1232 0.9% sodium chloride BOLUS 1000 mL IV   1242 toradol 15 mg IV    Emergency Department Course:  Past medical records, nursing notes, and vitals reviewed.    1155 I performed an exam of the patient as documented above.    IV was inserted and blood was drawn for laboratory testing, results above.     The patient was sent for a CT abdomen pelvis while in the emergency department, results above.      The patient provided a urine sample here in the  emergency department. This was sent for laboratory testing, findings above.      1400 Patient rechecked and updated.       Findings and plan explained to the Patient. Patient discharged home with instructions regarding supportive care, medications, and reasons to return. The importance of close follow-up was reviewed. The patient was prescribed norco.      Impression & Plan     Medical Decision Making:  Nita Trevizo is a 69 year old female who presents to the emergency department today with abdominal pain. Differential diagnosis considered includes but is not limited to UTI, pyelonephritis, renal colic, diverticulitis, IBS, IBD, among others. She is afebrile and well appearing. She does have left-sided abdominal tenderness, but no rebound or guarding. Labs are unrevealing, including a normal lactate. Urinalysis does not reveal any evidence of infection. CT abd/pelvis was obtained. This showed diverticulosis, but no evidence of diverticulitis at this time. No other cause of her pain noted on CT. The exact etiology of the pain is not clear at this time, but no life threatening cause has been identified today.  She will be discharged, and was warned that persistent or worsening symptoms should prompt re-examination (ED if necessary) in 8-12 hours.        Discharge Diagnosis:    ICD-10-CM    1. Abdominal pain, left lower quadrant R10.32        Disposition:  The patient is discharged to home.    Discharge Medications:  New Prescriptions    HYDROCODONE-ACETAMINOPHEN (NORCO) 5-325 MG TABLET    Take 1 tablet by mouth every 4 hours as needed for pain       Scribe Disclosure:  Mark FUENTES, am serving as a scribe at 11:55 AM on 1/9/2020 to document services personally performed by Denise Do PA-C based on my observations and the provider's statements to me.      1/9/2020   Denise Do PA-C Skiba, Julie Ann, PA-C  01/09/20 8748

## 2020-01-09 NOTE — ED AVS SNAPSHOT
[facility] Emergency Department  [address]  [facility] [address]  Phone:  [phone]  Fax:  [phone]                                    Nita Trevizo   MRN: [id number]    Department:  [facility] Emergency Department   Date of Visit:  1/9/2020           After Visit Summary Signature Page    I have received my discharge instructions, and my questions have been answered. I have discussed any challenges I see with this plan with the nurse or doctor.    ..........................................................................................................................................  Patient/Patient Representative Signature      ..........................................................................................................................................  Patient Representative Print Name and Relationship to Patient    ..................................................               ................................................  Date                                   Time    ..........................................................................................................................................  Reviewed by Signature/Title    ...................................................              ..............................................  Date                                               Time          22EPIC Rev 08/18

## 2020-01-10 ENCOUNTER — OFFICE VISIT (OUTPATIENT)
Dept: INTERNAL MEDICINE | Facility: CLINIC | Age: 70
End: 2020-01-10
Payer: COMMERCIAL

## 2020-01-10 VITALS
OXYGEN SATURATION: 97 % | RESPIRATION RATE: 18 BRPM | DIASTOLIC BLOOD PRESSURE: 75 MMHG | HEART RATE: 81 BPM | TEMPERATURE: 97.2 F | SYSTOLIC BLOOD PRESSURE: 146 MMHG

## 2020-01-10 DIAGNOSIS — N28.89 RIGHT KIDNEY MASS: ICD-10-CM

## 2020-01-10 DIAGNOSIS — R10.32 LLQ ABDOMINAL PAIN: Primary | ICD-10-CM

## 2020-01-10 PROCEDURE — 99214 OFFICE O/P EST MOD 30 MIN: CPT | Performed by: INTERNAL MEDICINE

## 2020-01-10 RX ORDER — CIPROFLOXACIN 500 MG/1
500 TABLET, FILM COATED ORAL 2 TIMES DAILY
Qty: 20 TABLET | Refills: 0 | Status: SHIPPED | OUTPATIENT
Start: 2020-01-10 | End: 2020-05-28

## 2020-01-10 RX ORDER — METRONIDAZOLE 500 MG/1
500 TABLET ORAL 3 TIMES DAILY
Qty: 30 TABLET | Refills: 0 | Status: SHIPPED | OUTPATIENT
Start: 2020-01-10 | End: 2020-05-28

## 2020-01-10 RX ORDER — ONDANSETRON 8 MG/1
8 TABLET, FILM COATED ORAL EVERY 8 HOURS PRN
Qty: 30 TABLET | Refills: 0 | Status: SHIPPED | OUTPATIENT
Start: 2020-01-10 | End: 2020-05-28

## 2020-01-10 NOTE — PROGRESS NOTES
"[facility] mri  Patient's instructions / PLAN:                                                        Plan:  1. Cipro 500 mg twice a day - antibiotic  2. Metronidazole 500 mg 3 times a day - antibiotic  3. Liquid diet and once you feel better you may change to soft diet  4. Follow up with dr Burns next Monday or Tuesday   5. Ondansetron 8 mg 3 times a day as needed for nausea     ASSESSMENT & PLAN:                                                      (R10.32) LLQ abdominal pain  (primary encounter diagnosis)  Comment: worse.   I will treat her empirically for diverticulitis   Since the pain is worse, I made an appointment with dr Burns on Jan 13 for f/u. I discussed her case briefly with dr Burns   Plan: ciprofloxacin (CIPRO) 500 MG tablet,         metroNIDAZOLE (FLAGYL) 500 MG tablet,         ondansetron (ZOFRAN) 8 MG tablet            (N28.89) Right kidney mass  Comment: Discussed with Dr. Burns and I ordered an MRI under her name,   Plan: MR Renal wo & w Contrast          Chief Complaint:                                                      ER follow up  Abd pain    SUBJECTIVE:                                                    History of present illness     Abd Pain  -- 4 days ago  -- intermittent, it comes in waves   -- She was in the ER yesterday and was prescribed hydrocodone for pain. She is feeling worse than yesterday.  -- Notes side effects from hydrocodone \"esophageal spasms\" so she is not taking  -- S/p Cholecystectomy, Appendectomy 1978  -- she notes very loose diarrhea yesterday afternoon  -- no blood in stools  -- Abd CT Reviewed  -- I explained to her despite the CT findings from yesterday which showed minor diverticulosis and no visible diverticulitis, I decided to treat her for diverticulitis. There is no clear explanation for her acute pain so we will try antibiotics and monitor.    ROS:                                                      ROS: negative for fever, chills, cough, wheezes, " chest pain, shortness of breath, vomiting, leg swelling, pos for abdominal pain    This document serves as a record of the services and decisions personally performed and made by Dr. Oj MD. It was created on their behalf by Kahlil Suero, a trained medical scribe. The creation of this document is based on the provider's statements to the medical scribe.  Kahlil Suero January 10, 2020 10:31 AM    OBJECTIVE:                                                    Physical Exam :    Blood pressure (!) 146/75, pulse 81, temperature 97.2  F (36.2  C), resp. rate 18, SpO2 97 %, not currently breastfeeding.   NAD, appears uncomfortable because of the pain   Skin: no rashes   Neck: supple, no JVD, No thyroidmegaly. Lymph nodes nonpalpable cervical and supraclavicular.  Chest: clear to auscultation bilaterally, good respiratory effort  Heart: S1 S2, RRR, no mgr appreciated  Abdomen: soft, very tender on the R side, no hepatosplenomegaly or masses appreciated, no abdominal bruit, present bowel sounds  Extremities: no edema,   Neurologic: A, Ox3, no focal signs appreciated    PMHx: reviewed  Past Medical History:   Diagnosis Date     Arthritis      Disorder of bone and cartilage, unspecified      Esophageal reflux      Osteopenia 6/14    -1.5      PSHx: reviewed  Past Surgical History:   Procedure Laterality Date     C APPENDECTOMY       C LIGATE FALLOPIAN TUBE       C NONSPECIFIC PROCEDURE      vein stripping x 3     C TOTAL ABDOM HYSTERECTOMY      for fibroids with BSO     HC REMOVAL GALLBLADDER       HC REMOVE TONSILS/ADENOIDS,<11 Y/O       ORTHOPEDIC SURGERY      wrist 2017 [facility]orge [facility]         Meds: reviewed  Current Outpatient Medications   Medication Sig Dispense Refill     clobetasol (TEMOVATE) 0.05 % external ointment Apply topically 2 times daily 15 g 3     cycloSPORINE (RESTASIS) 0.05 % ophthalmic emulsion 1 drop 2 times daily       IBUPROFEN 200 MG OR TABS 2 TABLET EVERY 4 TO 6 HOURS AS NEEDED for headaches         KRILL OIL  mg capsules       NAPROXEN 500 MG OR TBEC 1 TABLET TWICE DAILY prn 30 3     nitroGLYcerin (NITROSTAT) 0.4 MG sublingual tablet Place 1 tablet (0.4 mg) under the tongue every 5 minutes as needed for chest pain If you are still having symptoms after 3 doses (15 minutes) call 911. 25 tablet 0     polyethylene glycol (MIRALAX/GLYCOLAX) powder Take 1 capful by mouth as needed for constipation       polyethylene glycol 0.4%- propylene glycol 0.3% (SYSTANE ULTRA PF) 0.4-0.3 % SOLN ophthalmic solution Place 1 drop into both eyes 3 times daily as needed for dry eyes Reported on 2/28/2017       HYDROcodone-acetaminophen (NORCO) 5-325 MG tablet Take 1 tablet by mouth every 4 hours as needed for pain (Patient not taking: Reported on 1/10/2020) 15 tablet 0       Soc Hx: reviewed  Fam Hx: reviewed    The information in this document, created by the medical scribe for me, accurately reflects the services I personally performed and the decisions made by me. I have reviewed and approved this document for accuracy prior to leaving the patient care area.  January 10, 2020 11:09 AM     Jessy Mcwilliams MD  Internal Medicine

## 2020-01-10 NOTE — PATIENT INSTRUCTIONS
Plan:  1. Cipro 500 mg twice a day - antibiotic  2. Metronidazole 500 mg 3 times a day - antibiotic  3. Liquid diet and once you feel better you may change to soft diet  4. Follow up with dr Burns next Monday or Tuesday   5. Ondansetron 8 mg 3 times a day as needed for nausea

## 2020-01-13 ENCOUNTER — HOSPITAL ENCOUNTER (OUTPATIENT)
Dept: MRI IMAGING | Facility: CLINIC | Age: 70
Discharge: HOME | End: 2020-01-13
Attending: INTERNAL MEDICINE | Admitting: INTERNAL MEDICINE
Payer: COMMERCIAL

## 2020-01-13 ENCOUNTER — OFFICE VISIT (OUTPATIENT)
Dept: INTERNAL MEDICINE | Facility: CLINIC | Age: 70
End: 2020-01-13
Payer: COMMERCIAL

## 2020-01-13 VITALS
SYSTOLIC BLOOD PRESSURE: 137 MMHG | HEIGHT: 62 IN | BODY MASS INDEX: 25.65 KG/M2 | RESPIRATION RATE: 19 BRPM | DIASTOLIC BLOOD PRESSURE: 64 MMHG | HEART RATE: 77 BPM | OXYGEN SATURATION: 98 % | WEIGHT: 139.4 LBS | TEMPERATURE: 97.8 F

## 2020-01-13 DIAGNOSIS — R19.7 DIARRHEA, UNSPECIFIED TYPE: Primary | ICD-10-CM

## 2020-01-13 DIAGNOSIS — N28.89 RIGHT KIDNEY MASS: ICD-10-CM

## 2020-01-13 LAB
ERYTHROCYTE [DISTWIDTH] IN BLOOD BY AUTOMATED COUNT: 13.8 % (ref 10–15)
HCT VFR BLD AUTO: 47.3 % (ref 35–47)
HGB BLD-MCNC: 14.8 G/DL (ref 11.7–15.7)
MCH RBC QN AUTO: 27.6 PG (ref 26.5–33)
MCHC RBC AUTO-ENTMCNC: 31.3 G/DL (ref 31.5–36.5)
MCV RBC AUTO: 88 FL (ref 78–100)
PLATELET # BLD AUTO: 214 10E9/L (ref 150–450)
RBC # BLD AUTO: 5.36 10E12/L (ref 3.8–5.2)
WBC # BLD AUTO: 6.5 10E9/L (ref 4–11)

## 2020-01-13 PROCEDURE — 25500064 ZZH RX 255 OP 636: Performed by: INTERNAL MEDICINE

## 2020-01-13 PROCEDURE — 36415 COLL VENOUS BLD VENIPUNCTURE: CPT | Performed by: INTERNAL MEDICINE

## 2020-01-13 PROCEDURE — 80053 COMPREHEN METABOLIC PANEL: CPT | Performed by: INTERNAL MEDICINE

## 2020-01-13 PROCEDURE — 87506 IADNA-DNA/RNA PROBE TQ 6-11: CPT | Performed by: INTERNAL MEDICINE

## 2020-01-13 PROCEDURE — A9585 GADOBUTROL INJECTION: HCPCS | Performed by: INTERNAL MEDICINE

## 2020-01-13 PROCEDURE — 99214 OFFICE O/P EST MOD 30 MIN: CPT | Performed by: INTERNAL MEDICINE

## 2020-01-13 PROCEDURE — 87209 SMEAR COMPLEX STAIN: CPT | Performed by: INTERNAL MEDICINE

## 2020-01-13 PROCEDURE — 87177 OVA AND PARASITES SMEARS: CPT | Performed by: INTERNAL MEDICINE

## 2020-01-13 PROCEDURE — 74183 MRI ABD W/O CNTR FLWD CNTR: CPT

## 2020-01-13 PROCEDURE — 85027 COMPLETE CBC AUTOMATED: CPT | Performed by: INTERNAL MEDICINE

## 2020-01-13 PROCEDURE — 87493 C DIFF AMPLIFIED PROBE: CPT | Mod: XU | Performed by: INTERNAL MEDICINE

## 2020-01-13 PROCEDURE — 89055 LEUKOCYTE ASSESSMENT FECAL: CPT | Performed by: INTERNAL MEDICINE

## 2020-01-13 RX ORDER — GADOBUTROL 604.72 MG/ML
7.5 INJECTION INTRAVENOUS ONCE
Status: COMPLETED | OUTPATIENT
Start: 2020-01-13 | End: 2020-01-13

## 2020-01-13 RX ORDER — CYCLOSPORINE 0.5 MG/ML
EMULSION OPHTHALMIC
Refills: 3 | COMMUNITY
Start: 2019-10-16

## 2020-01-13 RX ADMIN — GADOBUTROL 6.5 ML: 604.72 INJECTION INTRAVENOUS at 13:35

## 2020-01-13 ASSESSMENT — MIFFLIN-ST. JEOR: SCORE: 1119.53

## 2020-01-13 NOTE — PROGRESS NOTES
Subjective     Nita Trevizo is a 69 year old female who presents to clinic today for the following health issues:    HPI   ED/UC Followup:    Facility:  [facility]  Date of visit: 1/9/2020  Reason for visit: Abdominal Pain  Current Status: abd pain is better, but still has Nausea, fatigue      Pt is a 69 year old female who is seen here to day to follow-up on ER visit.  Patient was seen in ER on 1 /09/20 for acute abdominal pain and had a CT abdomen done which was negative patient saw Dr. Aguilar Clifton the next day and she was empirically started on antibiotic for presumed diverticulitis.  Was started on Cipro and Flagyl. Patient says the abdominal pain is slightly better .  Patient also complains of having on and off diarrhea since before her ER visit but this was worsened yesterday, and loose stools watery at least 10-12 times.  No blood or mucus in the stool.  No fever or chills.  Has some nausea.  Patient has not had any bowel movement since last night and this morning.  Patient has not been drinking enough water, had only 2 glasses of water yesterday and has some dizziness this morning.  Patient is scheduled to get MRI abdomen today as she was found to have a indeterminate mass in the right kidney on her CT abdomen..       Patient Active Problem List   Diagnosis     Chronic maxillary sinusitis     Esophageal reflux     Osteopenia     CARDIOVASCULAR SCREENING; LDL GOAL LESS THAN 130     Dry eyes, bilateral     Past Surgical History:   Procedure Laterality Date     C APPENDECTOMY       C LIGATE FALLOPIAN TUBE       C NONSPECIFIC PROCEDURE      vein stripping x 3     C TOTAL ABDOM HYSTERECTOMY      for fibroids with BSO     HC REMOVAL GALLBLADDER       HC REMOVE TONSILS/ADENOIDS,<13 Y/O       ORTHOPEDIC SURGERY      wrist 2017 [facility]orge [facility]ham        Social History     Tobacco Use     Smoking status: Never Smoker     Smokeless tobacco: Never Used   Substance Use Topics     Alcohol use: No     Alcohol/week: 0.0 standard  drinks     Family History   Problem Relation Age of Onset     Diabetes Mother         born 1928     Hypertension Mother      Cancer Paternal Grandfather         skin     Colon Cancer Maternal Grandfather          Current Outpatient Medications   Medication Sig Dispense Refill     ciprofloxacin (CIPRO) 500 MG tablet Take 1 tablet (500 mg) by mouth 2 times daily 20 tablet 0     KRILL OIL  mg capsules       metroNIDAZOLE (FLAGYL) 500 MG tablet Take 1 tablet (500 mg) by mouth 3 times daily for 10 days 30 tablet 0     nitroGLYcerin (NITROSTAT) 0.4 MG sublingual tablet Place 1 tablet (0.4 mg) under the tongue every 5 minutes as needed for chest pain If you are still having symptoms after 3 doses (15 minutes) call 911. 25 tablet 0     ondansetron (ZOFRAN) 8 MG tablet Take 1 tablet (8 mg) by mouth every 8 hours as needed for nausea 30 tablet 0     polyethylene glycol 0.4%- propylene glycol 0.3% (SYSTANE ULTRA PF) 0.4-0.3 % SOLN ophthalmic solution Place 1 drop into both eyes 3 times daily as needed for dry eyes Reported on 2/28/2017       HYDROcodone-acetaminophen (NORCO) 5-325 MG tablet Take 1 tablet by mouth every 4 hours as needed for pain (Patient not taking: Reported on 1/10/2020) 15 tablet 0     IBUPROFEN 200 MG OR TABS 2 TABLET EVERY 4 TO 6 HOURS AS NEEDED for headaches        polyethylene glycol (MIRALAX/GLYCOLAX) powder Take 1 capful by mouth as needed for constipation       RESTASIS 0.05 % ophthalmic emulsion INSTILL ONE DROP IN EACH EYE TWO TIMES A DAY  3         Reviewed and updated as needed this visit by Provider         Review of Systems   ROS COMP: CONSTITUTIONAL: NEGATIVE for fever, chills, change in weight  EYES: NEGATIVE for vision changes or irritation  ENT/MOUTH: NEGATIVE for ear, mouth and throat problems  RESP: NEGATIVE for significant cough or SOB  CV: NEGATIVE for chest pain, palpitations or peripheral edema  GI: diarrhea  : negative  MUSCULOSKELETAL: NEGATIVE for significant arthralgias or  "myalgia      Objective    /64   Pulse 77   Temp 97.8  F (36.6  C) (Oral)   Resp 19   Ht 1.581 m (5' 2.25\")   Wt 63.2 kg (139 lb 6.4 oz)   SpO2 98%   BMI 25.29 kg/m    Body mass index is 25.29 kg/m .  Physical Exam   GENERAL: healthy, alert and no distress  EYES: Eyes grossly normal to inspection, PERRL and conjunctivae and sclerae normal  NECK: no adenopathy, no asymmetry, masses, or scars and thyroid normal to palpation  RESP: lungs clear to auscultation - no rales, rhonchi or wheezes  CV: regular rate and rhythm,   ABDOMEN: soft,  Mild tenderness present in LLQ, bowel sounds normal  MS: no gross musculoskeletal defects noted, no edema  NEURO: Normal strength and tone, mentation intact and speech normal           Assessment & Plan     (R19.7) Diarrhea, unspecified type  (primary encounter diagnosis)  Plan: advised to Maintain hydration by drinking small amounts of clear fluids (such as Pedialyte and others) frequently, then advance diet as tolerated.  May try BRAT diet if desired.   Check  Ova and ParasiteExam Routine, Fecal leukocyte, Enteric Bacteria and Virus Panel by [facility] Stool, Clostridium difficile Toxin B PCR, CBC with platelets.Comprehensive metabolic panel  Call if symptoms worsen, high fever, severe weakness or fainting, increased abdominal pain, blood in stool or vomit, or failure to improve          Andrae Burns MD  [facility]        "

## 2020-01-13 NOTE — NURSING NOTE
"/64   Pulse 77   Temp 97.8  F (36.6  C) (Oral)   Resp 19   Ht 1.581 m (5' 2.25\")   Wt 63.2 kg (139 lb 6.4 oz)   SpO2 98%   BMI 25.29 kg/m    Patient in for ED follow up.  Sinai Patrick CMA    "

## 2020-01-14 DIAGNOSIS — R19.7 DIARRHEA, UNSPECIFIED TYPE: ICD-10-CM

## 2020-01-14 LAB
ALBUMIN SERPL-MCNC: 3.8 G/DL (ref 3.4–5)
ALP SERPL-CCNC: 68 U/L (ref 40–150)
ALT SERPL W P-5'-P-CCNC: 44 U/L (ref 0–50)
ANION GAP SERPL CALCULATED.3IONS-SCNC: 5 MMOL/L (ref 3–14)
AST SERPL W P-5'-P-CCNC: 15 U/L (ref 0–45)
BILIRUB SERPL-MCNC: 0.4 MG/DL (ref 0.2–1.3)
BUN SERPL-MCNC: 18 MG/DL (ref 7–30)
C DIFF TOX B STL QL: NEGATIVE
CALCIUM SERPL-MCNC: 9 MG/DL (ref 8.5–10.1)
CHLORIDE SERPL-SCNC: 107 MMOL/L (ref 94–109)
CO2 SERPL-SCNC: 28 MMOL/L (ref 20–32)
CREAT SERPL-MCNC: 0.72 MG/DL (ref 0.52–1.04)
GFR SERPL CREATININE-BSD FRML MDRD: 86 ML/MIN/{1.73_M2}
GLUCOSE SERPL-MCNC: 94 MG/DL (ref 70–99)
POTASSIUM SERPL-SCNC: 4.2 MMOL/L (ref 3.4–5.3)
PROT SERPL-MCNC: 7.3 G/DL (ref 6.8–8.8)
SODIUM SERPL-SCNC: 140 MMOL/L (ref 133–144)
SPECIMEN SOURCE: NORMAL
SPECIMEN SOURCE: NORMAL
WBC STL QL MICRO: NORMAL

## 2020-01-15 LAB
C COLI+JEJUNI+LARI FUSA STL QL NAA+PROBE: NOT DETECTED
EC STX1 GENE STL QL NAA+PROBE: NOT DETECTED
EC STX2 GENE STL QL NAA+PROBE: NOT DETECTED
ENTERIC PATHOGEN COMMENT: NORMAL
NOROV GI+II ORF1-ORF2 JNC STL QL NAA+PR: NOT DETECTED
O+P STL MICRO: NORMAL
O+P STL MICRO: NORMAL
RVA NSP5 STL QL NAA+PROBE: NOT DETECTED
SALMONELLA SP RPOD STL QL NAA+PROBE: NOT DETECTED
SHIGELLA SP+EIEC IPAH STL QL NAA+PROBE: NOT DETECTED
SPECIMEN SOURCE: NORMAL
V CHOL+PARA RFBL+TRKH+TNAA STL QL NAA+PR: NOT DETECTED
Y ENTERO RECN STL QL NAA+PROBE: NOT DETECTED

## 2020-01-19 ENCOUNTER — NURSE TRIAGE (OUTPATIENT)
Dept: NURSING | Facility: CLINIC | Age: 70
End: 2020-01-19

## 2020-01-19 NOTE — TELEPHONE ENCOUNTER
"Patient reports she vomited shortly after taking Cipro and Flagyl yesterday.  She has her last dose today and is concerned she will just vomit them due to current nausea.  Patient reports she has been able to keep down all the previous doses.  Also, patient reports red spots on fingers and arm.  Rash is not itchy or painful.  They are around 1/4 inch in diameter or so.   FNA advised she should try to take them.  FNA advised to call back re: rash if they get worse.  Caller verbalized understanding.      Reason for Disposition    Caller has medication question only, adult not sick, and triager answers question    Additional Information    Negative: Drug overdose and nurse unable to answer question    Negative: Caller requesting information not related to medicine    Negative: Caller requesting a prescription for Strep throat and has a positive culture result    Negative: Rash while taking a medication or within 3 days of stopping it    Negative: Immunization reaction suspected    Negative: [1] Asthma and [2] having symptoms of asthma (cough, wheezing, etc)    Negative: MORE THAN A DOUBLE DOSE of a prescription or over-the-counter (OTC) drug    Negative: [1] DOUBLE DOSE (an extra dose or lesser amount) of over-the-counter (OTC) drug AND [2] any symptoms (e.g., dizziness, nausea, pain, sleepiness)    Negative: [1] DOUBLE DOSE (an extra dose or lesser amount) of prescription drug AND [2] any symptoms (e.g., dizziness, nausea, pain, sleepiness)    Negative: Took another person's prescription drug    Negative: [1] DOUBLE DOSE (an extra dose or lesser amount) of prescription drug AND [2] NO symptoms (Exception: a double dose of antibiotics)    Negative: Diabetes drug error or overdose (e.g., insulin or extra dose)    Negative: [1] Request for URGENT new prescription or refill of \"essential\" medication (i.e., likelihood of harm to patient if not taken) AND [2] triager unable to fill per unit policy    Negative: [1] " Prescription not at pharmacy AND [2] was prescribed today by PCP    Negative: Pharmacy calling with prescription questions and triager unable to answer question    Negative: Caller has URGENT medication question about med that PCP prescribed and triager unable to answer question    Negative: Caller has NON-URGENT medication question about med that PCP prescribed and triager unable to answer question    Negative: Caller requesting a NON-URGENT new prescription or refill and triager unable to refill per unit policy    Negative: Caller has medication question about med not prescribed by PCP and triager unable to answer question (e.g., compatibility with other med, storage)    Negative: [1] DOUBLE DOSE (an extra dose or lesser amount) of over-the-counter (OTC) drug AND [2] NO symptoms    Negative: [1] DOUBLE DOSE (an extra dose or lesser amount) of antibiotic drug AND [2] NO symptoms    Protocols used: MEDICATION QUESTION CALL-A-

## 2020-01-20 ENCOUNTER — TELEPHONE (OUTPATIENT)
Dept: INTERNAL MEDICINE | Facility: CLINIC | Age: 70
End: 2020-01-20

## 2020-01-20 NOTE — TELEPHONE ENCOUNTER
Patient advised to stop Cipro, use Benadryl AS NEEDED.  If symptoms worsen while taking Benadryl she is to be seen in ER/UC.  KIYA Lopez R.N.

## 2020-01-20 NOTE — TELEPHONE ENCOUNTER
Patient calls stating that she has 3 days left of the Cipro and she can not keep it down.  Patient states her eye lids appear swollen and a rash on her arms and hands also since yesterday.  Patient has not taken any OTC such as Benadryl and is asking if she should and if she is having a reaction to Cipro if she should be put on something different.  Patient reports no appetite but she is keeping down fluids.  Please advise.

## 2020-01-20 NOTE — TELEPHONE ENCOUNTER
Ok to stop cipro at this time and watch for symptoms. , take OTC Benadryl as directed prn . Call or return to clinic prn if these symtoms worsen, fail to improve as anticipated, or if new symptoms develop.

## 2020-05-25 ENCOUNTER — MYC MEDICAL ADVICE (OUTPATIENT)
Dept: INTERNAL MEDICINE | Facility: CLINIC | Age: 70
End: 2020-05-25

## 2020-05-28 ENCOUNTER — VIRTUAL VISIT (OUTPATIENT)
Dept: INTERNAL MEDICINE | Facility: CLINIC | Age: 70
End: 2020-05-28
Payer: COMMERCIAL

## 2020-05-28 DIAGNOSIS — K58.2 IRRITABLE BOWEL SYNDROME WITH BOTH CONSTIPATION AND DIARRHEA: Primary | ICD-10-CM

## 2020-05-28 PROCEDURE — 99213 OFFICE O/P EST LOW 20 MIN: CPT | Mod: GT | Performed by: INTERNAL MEDICINE

## 2020-05-28 NOTE — PROGRESS NOTES
"Nita Trevizo is a 69 year old female who is being evaluated via a billable video visit.      The patient has been notified of following:     \"This video visit will be conducted via a call between you and your physician/provider. We have found that certain health care needs can be provided without the need for an in-person physical exam.  This service lets us provide the care you need with a video conversation.  If a prescription is necessary we can send it directly to your pharmacy.  If lab work is needed we can place an order for that and you can then stop by our lab to have the test done at a later time.    Video visits are billed at different rates depending on your insurance coverage.  Please reach out to your insurance provider with any questions.    If during the course of the call the physician/provider feels a video visit is not appropriate, you will not be charged for this service.\"    Patient has given verbal consent for Video visit? Yes    How would you like to obtain your AVS? Raheem    Patient would like the video invitation sent by: Text to cell phone: [phone] (H)    Will anyone else be joining your video visit? No      Subjective     Nita Trevizo is a 69 year old female who presents today via video visit for the following health issues:    HPI     Pt c/o Flatulence, loose\"mushy stools\" , on and off abdominal.cramping which gets better after bowel movement and alternating loose stools and constipation. ongoing since 01/20 ,  Minimal nausea when she has cramping, no vomiting.  No fever or chills, no blood or mucus in the stool.  Patient says sometimes when she drinks coffee with milk she gets diarrhea.  No history of anxiety.  Cannot take Metamucil.  Takes MiraLAX on and off.  And she is also on multiple over-the-counter products like a krill oil, collagen, vitamin C, probiotic.  Patient had a CT abdomen done few months ago and which just showed diverticulosis and no evidence of " diverticulitis.  Last colonoscopy 2010 and is due for colonoscopy this year.    Video Start Time: 10:06 AM         Patient Active Problem List   Diagnosis     Chronic maxillary sinusitis     Esophageal reflux     Osteopenia     CARDIOVASCULAR SCREENING; LDL GOAL LESS THAN 130     Dry eyes, bilateral     Past Surgical History:   Procedure Laterality Date     ABDOMEN SURGERY       C APPENDECTOMY       C LIGATE FALLOPIAN TUBE       C NONSPECIFIC PROCEDURE      vein stripping x 3     C TOTAL ABDOM HYSTERECTOMY      for fibroids with BSO     COLONOSCOPY       GENITOURINARY SURGERY       HC REMOVAL GALLBLADDER       HC REMOVE TONSILS/ADENOIDS,<11 Y/O       ORTHOPEDIC SURGERY      wrist 2017 [facility]      VASCULAR SURGERY         Social History     Tobacco Use     Smoking status: Never Smoker     Smokeless tobacco: Never Used   Substance Use Topics     Alcohol use: No     Alcohol/week: 0.0 standard drinks     Family History   Problem Relation Age of Onset     Diabetes Mother         born 1928     Hypertension Mother      Obesity Mother      Cancer Paternal Grandfather         skin     Colon Cancer Maternal Grandfather         had in his late 80s     Substance Abuse Brother      Osteoporosis Paternal Grandmother          Current Outpatient Medications   Medication Sig Dispense Refill     COLLAGEN PO        IBUPROFEN 200 MG OR TABS 2 TABLET EVERY 4 TO 6 HOURS AS NEEDED for headaches        KRILL OIL  mg capsules       nitroGLYcerin (NITROSTAT) 0.4 MG sublingual tablet Place 1 tablet (0.4 mg) under the tongue every 5 minutes as needed for chest pain If you are still having symptoms after 3 doses (15 minutes) call 911. 25 tablet 0     polyethylene glycol (MIRALAX/GLYCOLAX) powder Take 1 capful by mouth as needed for constipation       polyethylene glycol 0.4%- propylene glycol 0.3% (SYSTANE ULTRA PF) 0.4-0.3 % SOLN ophthalmic solution Place 1 drop into both eyes 3 times daily as needed for dry eyes Reported on 2/28/2017  "      PROBIOTIC PRODUCT PO Formagen digestive       RESTASIS 0.05 % ophthalmic emulsion INSTILL ONE DROP IN EACH EYE TWO TIMES A DAY  3       Reviewed and updated as needed this visit by Provider         Review of Systems   CONSTITUTIONAL: NEGATIVE for fever, chills, change in weight  ENT/MOUTH: NEGATIVE for ear, mouth and throat problems  RESP: NEGATIVE for significant cough or SOB  CV: NEGATIVE for chest pain, palpitations or peripheral edema  GI: alternating diarrhea constipation and abdominal cramping and gas  : negative  NEURO: NEGATIVE for weakness, dizziness or paresthesias      Objective    There were no vitals taken for this visit.  Estimated body mass index is 25.29 kg/m  as calculated from the following:    Height as of 1/13/20: 1.581 m (5' 2.25\").    Weight as of 1/13/20: 63.2 kg (139 lb 6.4 oz).  Physical Exam     GENERAL: Healthy, alert and no distress  EYES: Eyes grossly normal to inspection.  No discharge or erythema, or obvious scleral/conjunctival abnormalities.  RESP: No audible wheeze, cough, or visible cyanosis.  No visible retractions or increased work of breathing.    SKIN: Visible skin clear.   NEURO:   Mentation and speech appropriate for age.  PSYCH: Mentation appears normal, affect normal/bright, judgement and insight intact, normal speech and appearance well-groomed.      Diagnostic Test Results:  CT abdomen reviewed in Epic        Assessment & Plan     (K58.2) Irritable bowel syndrome with both constipation and diarrhea  (primary encounter diagnosis)  Plan: Explained about the condition, patient was advised to keep a food log and to avoid foods that flares of the symptoms, patient does not like to take Metamucil, advised to take FiberCon, and try as needed Imodium for diarrhea.  Due for colonoscopy in August 2020 ,advised to get a colonoscopy.          Return in about 3 months (around 8/31/2020) for Physical Exam.    Andrae Burns MD  [facility] ([facility]3] " [place]      Video-Visit Details    Type of service:  Video Visit    Video End Time:10:18 AM    Originating Location (pt. Location): Home    Distant Location (provider location):  [facility]     Platform used for Video Visit: Doximity    Return in about 3 months (around 8/31/2020) for Physical Exam.       Andrae Burns MD

## 2020-07-09 ENCOUNTER — TRANSFERRED RECORDS (OUTPATIENT)
Dept: HEALTH INFORMATION MANAGEMENT | Facility: CLINIC | Age: 70
End: 2020-07-09

## 2020-09-09 ENCOUNTER — HOSPITAL ENCOUNTER (OUTPATIENT)
Dept: MAMMOGRAPHY | Facility: CLINIC | Age: 70
Discharge: HOME | End: 2020-09-09
Attending: INTERNAL MEDICINE | Admitting: INTERNAL MEDICINE
Payer: COMMERCIAL

## 2020-09-09 ENCOUNTER — OFFICE VISIT (OUTPATIENT)
Dept: INTERNAL MEDICINE | Facility: CLINIC | Age: 70
End: 2020-09-09
Payer: COMMERCIAL

## 2020-09-09 VITALS
HEART RATE: 88 BPM | DIASTOLIC BLOOD PRESSURE: 70 MMHG | SYSTOLIC BLOOD PRESSURE: 134 MMHG | HEIGHT: 65 IN | RESPIRATION RATE: 14 BRPM | TEMPERATURE: 98.4 F | BODY MASS INDEX: 24.59 KG/M2 | WEIGHT: 147.6 LBS | OXYGEN SATURATION: 99 %

## 2020-09-09 DIAGNOSIS — Z12.11 SCREENING FOR COLON CANCER: Primary | ICD-10-CM

## 2020-09-09 DIAGNOSIS — Z78.0 ASYMPTOMATIC POSTMENOPAUSAL STATUS: ICD-10-CM

## 2020-09-09 DIAGNOSIS — Z12.31 OTHER SCREENING MAMMOGRAM: ICD-10-CM

## 2020-09-09 DIAGNOSIS — Z00.00 ROUTINE HISTORY AND PHYSICAL EXAMINATION OF ADULT: ICD-10-CM

## 2020-09-09 DIAGNOSIS — K58.2 IRRITABLE BOWEL SYNDROME WITH BOTH CONSTIPATION AND DIARRHEA: ICD-10-CM

## 2020-09-09 LAB — HGB BLD-MCNC: 14.4 G/DL (ref 11.7–15.7)

## 2020-09-09 PROCEDURE — 80053 COMPREHEN METABOLIC PANEL: CPT | Performed by: INTERNAL MEDICINE

## 2020-09-09 PROCEDURE — 99397 PER PM REEVAL EST PAT 65+ YR: CPT | Performed by: INTERNAL MEDICINE

## 2020-09-09 PROCEDURE — 80061 LIPID PANEL: CPT | Performed by: INTERNAL MEDICINE

## 2020-09-09 PROCEDURE — 85018 HEMOGLOBIN: CPT | Performed by: INTERNAL MEDICINE

## 2020-09-09 PROCEDURE — 77067 SCR MAMMO BI INCL CAD: CPT

## 2020-09-09 PROCEDURE — 84443 ASSAY THYROID STIM HORMONE: CPT | Performed by: INTERNAL MEDICINE

## 2020-09-09 PROCEDURE — 36415 COLL VENOUS BLD VENIPUNCTURE: CPT | Performed by: INTERNAL MEDICINE

## 2020-09-09 ASSESSMENT — ENCOUNTER SYMPTOMS
DIARRHEA: 1
CHILLS: 0
HEMATURIA: 0
HEMATOCHEZIA: 0
COUGH: 0
FEVER: 0
CONSTIPATION: 1
DIZZINESS: 0
HEARTBURN: 1
NERVOUS/ANXIOUS: 0
NAUSEA: 0
ABDOMINAL PAIN: 1
DYSURIA: 0
EYE PAIN: 0
FREQUENCY: 0
JOINT SWELLING: 0
PALPITATIONS: 0
MYALGIAS: 0
ARTHRALGIAS: 1
BREAST MASS: 0
HEADACHES: 1
SORE THROAT: 1
WEAKNESS: 0
PARESTHESIAS: 0
SHORTNESS OF BREATH: 0

## 2020-09-09 ASSESSMENT — ACTIVITIES OF DAILY LIVING (ADL): CURRENT_FUNCTION: NO ASSISTANCE NEEDED

## 2020-09-09 ASSESSMENT — MIFFLIN-ST. JEOR: SCORE: 1199.35

## 2020-09-09 NOTE — NURSING NOTE
"/70   Pulse 88   Temp 98.4  F (36.9  C) (Oral)   Resp 14   Ht 1.657 m (5' 5.25\")   Wt 67 kg (147 lb 9.6 oz)   SpO2 99%   BMI 24.37 kg/m     Patient in for Medicare Visit.  Sinai Patrick CMA    "

## 2020-09-09 NOTE — PROGRESS NOTES
"SUBJECTIVE:   Nita Trevizo is a 70 year old female who presents for Preventive Visit.    Are you in the first 12 months of your Medicare coverage?  No    Healthy Habits:     In general, how would you rate your overall health?  Good    Frequency of exercise:  2-3 days/week    Duration of exercise:  15-30 minutes    Do you usually eat at least 4 servings of fruit and vegetables a day, include whole grains    & fiber and avoid regularly eating high fat or \"junk\" foods?  No    Taking medications regularly:  Yes    Medication side effects:  None    Ability to successfully perform activities of daily living:  No assistance needed    Home Safety:  Lack of grab bars in the bathroom    Hearing Impairment:  No hearing concerns    In the past 6 months, have you been bothered by leaking of urine? Yes    In general, how would you rate your overall mental or emotional health?  Good      PHQ-2 Total Score: 0    Additional concerns today:  Yes    Do you feel safe in your environment? Yes    Have you ever done Advance Care Planning? (For example, a Health Directive, POLST, or a discussion with a medical provider or your loved ones about your wishes): Yes, advance care planning is on file.      Fall risk       Cognitive Screening   1) Repeat 3 items (Leader, Season, Table)    2) Clock draw: NORMAL  3) 3 item recall: Recalls 3 objects  Results: 3 items recalled: COGNITIVE IMPAIRMENT LESS LIKELY    Mini-CogTM Copyright DENISE Flores. Licensed by the author for use in [facility]; reprinted with permission ([email or web address]@.[email or web address]). All rights reserved.      Do you have sleep apnea, excessive snoring or daytime drowsiness?: no    Reviewed and updated as needed this visit by clinical staff         Reviewed and updated as needed this visit by Provider        Pt has h/o IBS and has been having increased symptoms since few months, has on and off constipation and diarrhea, no blood in stool.      Past Medical History:   Diagnosis Date     " Arthritis      Disorder of bone and cartilage, unspecified      Esophageal reflux      Osteopenia 6/14    -1.5       Past Surgical History:   Procedure Laterality Date     ABDOMEN SURGERY       C APPENDECTOMY       C LIGATE FALLOPIAN TUBE       C NONSPECIFIC PROCEDURE      vein stripping x 3     C TOTAL ABDOM HYSTERECTOMY      for fibroids with BSO     COLONOSCOPY       GENITOURINARY SURGERY       HC REMOVAL GALLBLADDER       HC REMOVE TONSILS/ADENOIDS,<11 Y/O       ORTHOPEDIC SURGERY      [facility]      VASCULAR SURGERY         Current Outpatient Medications   Medication Sig Dispense Refill     COLLAGEN PO        IBUPROFEN 200 MG OR TABS 2 TABLET EVERY 4 TO 6 HOURS AS NEEDED for headaches        KRILL OIL  mg capsules       nitroGLYcerin (NITROSTAT) 0.4 MG sublingual tablet Place 1 tablet (0.4 mg) under the tongue every 5 minutes as needed for chest pain If you are still having symptoms after 3 doses (15 minutes) call 911. 25 tablet 0     polyethylene glycol (MIRALAX/GLYCOLAX) powder Take 1 capful by mouth as needed for constipation       PROBIOTIC PRODUCT PO Formagen digestive       RESTASIS 0.05 % ophthalmic emulsion INSTILL ONE DROP IN EACH EYE TWO TIMES A DAY  3       Family History   Problem Relation Age of Onset     Diabetes Mother         born 1928     Hypertension Mother      Obesity Mother      Cancer Paternal Grandfather         skin     Colon Cancer Maternal Grandfather         had in his late 80s     Substance Abuse Brother      Osteoporosis Paternal Grandmother        Social History     Tobacco Use     Smoking status: Never Smoker     Smokeless tobacco: Never Used   Substance Use Topics     Alcohol use: No     Alcohol/week: 0.0 standard drinks     If you drink alcohol do you typically have >3 drinks per day or >7 drinks per week? No    Alcohol Use 9/9/2020   Prescreen: >3 drinks/day or >7 drinks/week? Not Applicable   Prescreen: >3 drinks/day or >7 drinks/week? -   No flowsheet data  found.      Current providers sharing in care for this patient include:   Patient Care Team:  Andrae Burns MD as PCP - General (Internal Medicine)  Andrae Burns MD as Assigned PCP    The following health maintenance items are reviewed in Epic and correct as of today:  Health Maintenance   Topic Date Due     ZOSTER IMMUNIZATION (2 of 3) 11/10/2011     INFLUENZA VACCINE (1) 09/01/2020     COLORECTAL CANCER SCREENING  08/15/2020     MEDICARE ANNUAL WELLNESS VISIT  08/19/2020     MAMMO SCREENING  08/19/2020     FALL RISK ASSESSMENT  01/13/2021     ADVANCE CARE PLANNING  02/11/2024     LIPID  08/19/2024     DTAP/TDAP/TD IMMUNIZATION (5 - Td) 07/05/2026     DEXA  Completed     HEPATITIS C SCREENING  Completed     PHQ-2  Completed     PNEUMOCOCCAL IMMUNIZATION 65+ LOW/MEDIUM RISK  Completed     IPV IMMUNIZATION  Aged Out     MENINGITIS IMMUNIZATION  Aged Out     HEPATITIS B IMMUNIZATION  Aged Out       Review of Systems   Constitutional: Negative for chills and fever.   HENT: Negative for ear pain and hearing loss.         Allergies    Eyes: Negative for pain and visual disturbance.   Respiratory: Negative for cough and shortness of breath.    Cardiovascular: Negative for chest pain, palpitations and peripheral edema.   Gastrointestinal: Positive for abdominal pain, constipation and diarrhea. Negative for hematochezia and nausea.        Has h/o IBS    Breasts:  Negative for tenderness, breast mass and discharge.   Genitourinary: Negative for dysuria, frequency, genital sores, hematuria, pelvic pain, urgency, vaginal bleeding and vaginal discharge.   Musculoskeletal: Negative for joint swelling and myalgias.   Skin: Negative for rash.   Neurological: Negative for dizziness, weakness and paresthesias.   Psychiatric/Behavioral: Negative for mood changes. The patient is not nervous/anxious.         OBJECTIVE:   /70   Pulse 88   Temp 98.4  F (36.9  C) (Oral)   Resp 14   Ht 1.657 m (5'  "5.25\")   Wt 67 kg (147 lb 9.6 oz)   SpO2 99%   BMI 24.37 kg/m   Estimated body mass index is 25.29 kg/m  as calculated from the following:    Height as of 1/13/20: 1.581 m (5' 2.25\").    Weight as of 1/13/20: 63.2 kg (139 lb 6.4 oz).  Physical Exam  GENERAL APPEARANCE: healthy, alert and no distress  EYES: Eyes grossly normal to inspection, PERRL and conjunctivae and sclerae normal  HENT:  mouth without ulcers or lesions, oropharynx clear and oral mucous membranes moist  NECK: no adenopathy, no asymmetry, masses, or scars and thyroid normal to palpation  RESP: lungs clear to auscultation - no rales, rhonchi or wheezes  BREAST: normal without masses, tenderness or nipple discharge and no palpable axillary masses or adenopathy  CV: regular rate and rhythm,  , no peripheral edema and peripheral pulses strong  ABDOMEN: soft, nontender,   and bowel sounds normal  MS: no musculoskeletal defects are noted and gait is age appropriate without ataxia  NEURO: Normal strength and tone, sensory exam grossly normal, mentation intact and speech normal  PSYCH: mentation appears normal and affect normal/bright       ASSESSMENT / PLAN:       (Z00.00) Routine history and physical examination of adult  Plan: Hemoglobin, Comprehensive metabolic panel,         Lipid panel reflex to direct LDL Fasting, TSH         with free T4 reflex            (Z12.11) Screening for colon cancer    Plan: GASTROENTEROLOGY ADULT REF PROCEDURE ONLY            (K58.2) Irritable bowel syndrome with both constipation and diarrhea  Plan: GASTROENTEROLOGY ADULT REF CONSULT ONLY          (Z78.0) Asymptomatic postmenopausal status  Plan: DX Hip/Pelvis/Spine            COUNSELING:  Reviewed preventive health counseling, as reflected in patient instructions       Regular exercise       Healthy diet/nutrition       Immunizations    Declined: Influenza                Osteoporosis Prevention/Bone Health    Estimated body mass index is 25.29 kg/m  as calculated from " "the following:    Height as of 1/13/20: 1.581 m (5' 2.25\").    Weight as of 1/13/20: 63.2 kg (139 lb 6.4 oz).        She reports that she has never smoked. She has never used smokeless tobacco.      Appropriate preventive services were discussed with this patient, including applicable screening as appropriate for cardiovascular disease, diabetes, osteopenia/osteoporosis, and glaucoma.  As appropriate for age/gender, discussed screening for colorectal cancer, prostate cancer, breast cancer, and cervical cancer. Checklist reviewing preventive services available has been given to the patient.    Reviewed patients plan of care and provided an AVS. The Basic Care Plan (routine screening as documented in Health Maintenance) for Nita meets the Care Plan requirement. This Care Plan has been established and reviewed with the Patient.    Counseling Resources:  ATP IV Guidelines  Pooled Cohorts Equation Calculator  Breast Cancer Risk Calculator  Breast Cancer: Medication to Reduce Risk  FRAX Risk Assessment  ICSI Preventive Guidelines  Dietary Guidelines for Americans, 2010  [employer]'s MyPlate  ASA Prophylaxis  Lung CA Screening    Andrae Burns MD  [facility]    Identified Health Risks:  "

## 2020-09-10 LAB
ALBUMIN SERPL-MCNC: 3.5 G/DL (ref 3.4–5)
ALP SERPL-CCNC: 79 U/L (ref 40–150)
ALT SERPL W P-5'-P-CCNC: 62 U/L (ref 0–50)
ANION GAP SERPL CALCULATED.3IONS-SCNC: 8 MMOL/L (ref 3–14)
AST SERPL W P-5'-P-CCNC: 22 U/L (ref 0–45)
BILIRUB SERPL-MCNC: 0.3 MG/DL (ref 0.2–1.3)
BUN SERPL-MCNC: 13 MG/DL (ref 7–30)
CALCIUM SERPL-MCNC: 9 MG/DL (ref 8.5–10.1)
CHLORIDE SERPL-SCNC: 107 MMOL/L (ref 94–109)
CHOLEST SERPL-MCNC: 196 MG/DL
CO2 SERPL-SCNC: 26 MMOL/L (ref 20–32)
CREAT SERPL-MCNC: 0.73 MG/DL (ref 0.52–1.04)
GFR SERPL CREATININE-BSD FRML MDRD: 84 ML/MIN/{1.73_M2}
GLUCOSE SERPL-MCNC: 84 MG/DL (ref 70–99)
HDLC SERPL-MCNC: 71 MG/DL
LDLC SERPL CALC-MCNC: 105 MG/DL
NONHDLC SERPL-MCNC: 125 MG/DL
POTASSIUM SERPL-SCNC: 4.2 MMOL/L (ref 3.4–5.3)
PROT SERPL-MCNC: 7.4 G/DL (ref 6.8–8.8)
SODIUM SERPL-SCNC: 141 MMOL/L (ref 133–144)
TRIGL SERPL-MCNC: 100 MG/DL
TSH SERPL DL<=0.005 MIU/L-ACNC: 0.79 MU/L (ref 0.4–4)

## 2020-09-11 NOTE — TELEPHONE ENCOUNTER
RECORDS RECEIVED FROM: [facility]- Dr. Andrae Burns   DATE RECEIVED: 10/13/2020   NOTES STATUS DETAILS   OFFICE NOTE from referring provider Internal 9/9/2020, 5/28/2020, 1/13/2020 Office visit with Dr. Burns   OFFICE NOTE from other specialist Internal 1/10/2020 Office visit with Dr. Jessy Maldonado ([facility])    DISCHARGE SUMMARY from hospital Internal 1/9/2020 ([facility])    OPERATIVE REPORT Received EGD: 1/10/11   MEDICATION LIST Internal/ Care Everywhere         ENDOSCOPY  Received EGD: 1/10/11 ([place])    COLONOSCOPY N/A    ERCP N/A    EUS N/A    STOOL TESTING Internal  1/13/2020   PERTINENT LABS Internal / Care Everywhere    PATHOLOGY REPORTS (RELATED) Care Everywhere 1/10/11 ([place])    IMAGING (CT, MRI, EGD) Internal  CT Abdomen Pelvis: 1/9/2020     REFERRAL INFORMATION    Date referral was placed: 10/13/2020   Date all records received:    Date records were scanned into EPIC:    Date records were sent to Provider to review:    Date and recommendation received from provider:  LETTER SENT  SCHEDULE APPOINTMENT   Date patient was contacted to schedule:      9/29/2020 4:02pm Fax request sent to [place] ([phone]) for EGD report noted above. Jian     10/7/2020 12:24pm Received EGD report from [place]; sent to scan for uploading. A copy was placed in MD's folder. Jian

## 2020-09-15 DIAGNOSIS — Z11.59 ENCOUNTER FOR SCREENING FOR OTHER VIRAL DISEASES: Primary | ICD-10-CM

## 2020-10-08 ENCOUNTER — ANCILLARY PROCEDURE (OUTPATIENT)
Dept: BONE DENSITY | Facility: CLINIC | Age: 70
End: 2020-10-08
Attending: INTERNAL MEDICINE
Payer: COMMERCIAL

## 2020-10-08 DIAGNOSIS — Z78.0 ASYMPTOMATIC POSTMENOPAUSAL STATUS: ICD-10-CM

## 2020-10-08 PROCEDURE — 77080 DXA BONE DENSITY AXIAL: CPT | Performed by: INTERNAL MEDICINE

## 2020-10-13 ENCOUNTER — PRE VISIT (OUTPATIENT)
Dept: GASTROENTEROLOGY | Facility: CLINIC | Age: 70
End: 2020-10-13

## 2020-10-13 ENCOUNTER — VIRTUAL VISIT (OUTPATIENT)
Dept: GASTROENTEROLOGY | Facility: CLINIC | Age: 70
End: 2020-10-13
Attending: INTERNAL MEDICINE
Payer: COMMERCIAL

## 2020-10-13 VITALS — WEIGHT: 149 LBS | BODY MASS INDEX: 24.83 KG/M2 | HEIGHT: 65 IN

## 2020-10-13 DIAGNOSIS — R74.8 ELEVATED LIVER ENZYMES: ICD-10-CM

## 2020-10-13 DIAGNOSIS — R74.8 ELEVATED LIVER ENZYMES: Primary | ICD-10-CM

## 2020-10-13 DIAGNOSIS — K58.2 IRRITABLE BOWEL SYNDROME WITH BOTH CONSTIPATION AND DIARRHEA: ICD-10-CM

## 2020-10-13 DIAGNOSIS — Z11.59 ENCOUNTER FOR SCREENING FOR OTHER VIRAL DISEASES: ICD-10-CM

## 2020-10-13 PROCEDURE — U0003 INFECTIOUS AGENT DETECTION BY NUCLEIC ACID (DNA OR RNA); SEVERE ACUTE RESPIRATORY SYNDROME CORONAVIRUS 2 (SARS-COV-2) (CORONAVIRUS DISEASE [COVID-19]), AMPLIFIED PROBE TECHNIQUE, MAKING USE OF HIGH THROUGHPUT TECHNOLOGIES AS DESCRIBED BY CMS-2020-01-R: HCPCS | Performed by: INTERNAL MEDICINE

## 2020-10-13 PROCEDURE — 99203 OFFICE O/P NEW LOW 30 MIN: CPT | Mod: 95 | Performed by: PHYSICIAN ASSISTANT

## 2020-10-13 PROCEDURE — 80076 HEPATIC FUNCTION PANEL: CPT | Performed by: INTERNAL MEDICINE

## 2020-10-13 PROCEDURE — 36415 COLL VENOUS BLD VENIPUNCTURE: CPT | Performed by: INTERNAL MEDICINE

## 2020-10-13 RX ORDER — DICYCLOMINE HYDROCHLORIDE 10 MG/1
10 CAPSULE ORAL 4 TIMES DAILY PRN
Qty: 60 CAPSULE | Refills: 4 | Status: SHIPPED | OUTPATIENT
Start: 2020-10-13 | End: 2021-09-13

## 2020-10-13 ASSESSMENT — PAIN SCALES - GENERAL: PAINLEVEL: MILD PAIN (3)

## 2020-10-13 ASSESSMENT — MIFFLIN-ST. JEOR: SCORE: 1205.7

## 2020-10-13 NOTE — PROGRESS NOTES
"Nita Trevizo is a 70 year old female who is being evaluated via a billable video visit.      The patient has been notified of following:     \"This video visit will be conducted via a call between you and your physician/provider. We have found that certain health care needs can be provided without the need for an in-person physical exam.  This service lets us provide the care you need with a video conversation.  If a prescription is necessary we can send it directly to your pharmacy.  If lab work is needed we can place an order for that and you can then stop by our lab to have the test done at a later time.    Video visits are billed at different rates depending on your insurance coverage.  Please reach out to your insurance provider with any questions.    If during the course of the call the physician/provider feels a video visit is not appropriate, you will not be charged for this service.\"    Patient has given verbal consent for Video visit? Yes  How would you like to obtain your AVS? MyChart  If you are dropped from the video visit, the video invite should be resent to: Text to cell phone: [phone]  Will anyone else be joining your video visit? No   During this virtual visit the patient is located in [place], patient verifies this as the location during the entirety of this visit.     Video-Visit Details    Type of service:  Video Visit    Video Start Time: 2:02 pm  Video End Time: 2:34 pm    Originating Location (pt. Location): [place]    Distant Location (provider location):  [facility] [place]     Platform used for Video Visit: Jose Grey PA-C    GI CLINIC VISIT    CC/REFERRING MD:  Andrae Burns  REASON FOR CONSULTATION: IBS    ASSESSMENT/PLAN:  1.  Variable stool consistency/irregular stool pattern, abdominal pain  Patient reports variable stool consistency/pattern and abdominal pain with worsened episode of January this year, and symptoms " again worsening in the past couple of months.  Does have longstanding history of irritable bowel syndrome.  Current worsening in symptoms likely due to exacerbation and irritable bowel syndrome, functional diarrhea, food triggers, or small intestinal bacterial overgrowth.  He has had negative infectious stool studies in January of this year at which time she had similar symptoms, had CT scan with diverticulosis without diverticulitis and worsening symptoms with treatment of presumed diverticulitis.  Is scheduled for screening colonoscopy later this week, have messaged endoscopist to add random biopsies for microscopic colitis for this procedure.  Discussed the mainstay of irregular stool pattern would be to use fiber, has had bad reaction with Metamucil in the past.  Would recommend trial of Citrucel starting of low and increased to effect.  We also discussed trial of Bentyl to help with abdominal cramping.  Can take sparingly.  May benefit from meeting with GI dietitian, will message to see if this is covered by her insurance.  Otherwise discussed that she should avoid carbonated beverages such as sparkling water and diet Pepsi.  May benefit from modified low FODMAP diet.  --Plan for colonoscopy  --Start Citrucel for fiber.  Start with 1 teaspoon a day and mixed in with a glass of water.  It can take your body a little bit of time to get used to this.  If you tolerate this after 2 weeks you can increase up to 2 teaspoons a day  --Start taking Bentyl as needed.  You can take this before meals or only when you have symptoms.  Monitor for potential side effects  --Avoid carbonated beverages such as sparkling water or Pepsi because this can cause increased bloating  --I will reach out to our dietitian   --Track food and potential triggers    RTC 4 to 6 weeks    Thank you for this consultation.  It was a pleasure to participate in the care of this patient; please contact us with any further questions. I spent a total of  32  minutes face-to-face (via video) with Nita Trevizo during today's office visit.  Over 50% of which was counseling/coordinating care regarding the above delineated issues. Please see note for details.     Note completed using voice recognition software. Some word and grammatical errors may occur.      Blanquita Grey PA-C  Division of Gastroenterology, Hepatology & Nutrition  [facility]        HPI  Nita Trevizo is a 70 year old woman with a past medical history of GERD, osteopenia, irritable bowel syndrome presenting for evaluation of irregular stool pattern and abdominal pain.  She is new to the [facility] gastroenterology clinic and this is my first encounter with the patient.    Patient reports longstanding history of GI symptoms, starting in January of this year she had severe abdominal pain.  He had a work-up notable for negative C. difficile, enteric panel, ova and parasite, and fecal leukocytes.  Had CT abdomen and pelvis 1/9/2020 at which time she was noted to have distal colonic diverticulosis without diverticulitis.  Was again seen the next day for her symptoms and was treated with a course of ciprofloxacin and Flagyl for presumed diverticulitis.  Patient reports he had a reaction to her antibiotics.  Symptoms then seem to improve with time, however returned recently.  Denies any changes in medication, diet, travel, sick contacts before her symptoms started.    Now the patient reports that he is having left lower quadrant abdominal pain, can sometimes be an aching type pain.  Cramping abdominal pain can proceed having a bowel movement.    The patient describes that her stool pattern is variable.  She will have on average diarrhea 2 times a week which she describes as a cluster of bowel movements within an hour where she will have cramping associated with loose/watery stools.  Had one episode of cramping that woke her up in the middle the night though did not have a  "bowel movement at this time.  About 2 times a week she will have \"pebble \"bowel movements associated with some cramping or dull ache.  She will not feel empty when this happens.  Will bloat in the evening.  Occasionally can skip 1 day without a bowel movement.  Will take MiraLAX as needed for constipation which can be up to a couple of times a week, otherwise will try to drink coffee to help have a bowel movement.  Denies any fecal incontinence    She has previously been on probiotics, digestive enzymes and collagen which did not make a difference in her symptoms.  Fiber was tried in the past with Metamucil which worsened abdominal cramping    GERD- has worsened, has NG pills she takes with esophageal spasm. More often in the evening. Has been on omeprazole back in January (2 weeks)- does not want to take because of osteopenia. Helped for a while. constantly belching and burping regardless of food triggers     Diet recall:  Breakfast: will have bagel with peanut butter and banana or toast, bowl of cereal with peaches (buys no sugar added). Gluten- free pancake, eggs occasionally.   Lunch: usually a sandwich   Dinner: fish, chicken, or hamburger   Drinks: coffee in the morning, will drink la croix (2 cans), not a big water drinker. Drinks a diet pepsi at night     Daughter and grandson with celiac disease, no colorectal cancer, Crohn's, UC      EGD for dysphagia in 2011- normal small intestinal biopsies. Has been tested for celiac disease multiple times. Has also looked into low FODMAP diet.      ROS:    No fevers or chills  No weight loss (weight gain)  No blurry vision, double vision or change in vision  + sore throat- sinus drainage   No lymphadenopathy  No headache, paraesthesias, or weakness in a limb  No shortness of breath or wheezing  No chest pain or pressure  No arthralgias or myalgias   + cramping in feet   No rashes or skin changes  No odynophagia or dysphagia  No BRBPR, hematochezia, melena  No dysuria, " "frequency or urgency  No hot/cold intolerance or polyria  No anxiety or depression  + increased fatigue     PROBLEM LIST  Patient Active Problem List    Diagnosis Date Noted     Irritable bowel syndrome with both constipation and diarrhea 05/28/2020     Priority: Medium     Dry eyes, bilateral 10/12/2016     Priority: Medium     Osteopenia 08/01/2016     Priority: Medium     CARDIOVASCULAR SCREENING; LDL GOAL LESS THAN 130 08/01/2016     Priority: Medium     Esophageal reflux 06/27/2006     Priority: Medium     Chronic maxillary sinusitis 08/03/2004     Priority: Medium       PERTINENT PAST MEDICAL HISTORY:  Past Medical History:   Diagnosis Date     Arthritis      Disorder of bone and cartilage, unspecified      Esophageal reflux      Osteopenia 6/14    -1.5       PREVIOUS SURGERIES:  S/p cholecystectomy at age 26 (in the 70s)   tonsilltectomy   Past Surgical History:   Procedure Laterality Date     ABDOMEN SURGERY       C APPENDECTOMY       C LIGATE FALLOPIAN TUBE       C TOTAL ABDOM HYSTERECTOMY      for fibroids with BSO     COLONOSCOPY       GENITOURINARY SURGERY       HC REMOVAL GALLBLADDER       HC REMOVE TONSILS/ADENOIDS,<12 Y/O       ORTHOPEDIC SURGERY      wrist 2017 [facility]      VASCULAR SURGERY       [facility] NONSPECIFIC PROCEDURE      vein stripping x 3       PREVIOUS ENDOSCOPY:  Per above    ALLERGIES:     Allergies   Allergen Reactions     Ciprofloxacin      Rash, eyelids red puffy & itchy     Codeine      \"chest pain\"       PERTINENT MEDICATIONS:    Current Outpatient Medications:      Cetirizine HCl (ZYRTEC PO), , Disp: , Rfl:      COLLAGEN PO, , Disp: , Rfl:      IBUPROFEN 200 MG OR TABS, 2 TABLET EVERY 4 TO 6 HOURS AS NEEDED for headaches , Disp: , Rfl:      KRILL OIL PO, 250 mg capsules, Disp: , Rfl:      nitroGLYcerin (NITROSTAT) 0.4 MG sublingual tablet, Place 1 tablet (0.4 mg) under the tongue every 5 minutes as needed for chest pain If you are still having symptoms after 3 doses (15 minutes) " call 911., Disp: 25 tablet, Rfl: 0     polyethylene glycol (MIRALAX/GLYCOLAX) powder, Take 1 capful by mouth as needed for constipation, Disp: , Rfl:      PROBIOTIC PRODUCT PO, Formagen digestive, Disp: , Rfl:      RESTASIS 0.05 % ophthalmic emulsion, INSTILL ONE DROP IN EACH EYE TWO TIMES A DAY, Disp: , Rfl: 3  Has been taking increased ibuprofen- maybe once a day (she had increased pain)    SOCIAL HISTORY:  No alcohol use, drug use   Social History     Socioeconomic History     Marital status:      Spouse name: Not on file     Number of children: Not on file     Years of education: Not on file     Highest education level: Not on file   Occupational History     Not on file   Social Needs     Financial resource strain: Not on file     Food insecurity     Worry: Not on file     Inability: Not on file     Transportation needs     Medical: Not on file     Non-medical: Not on file   Tobacco Use     Smoking status: Never Smoker     Smokeless tobacco: Never Used   Substance and Sexual Activity     Alcohol use: No     Alcohol/week: 0.0 standard drinks     Drug use: No     Sexual activity: Never   Lifestyle     Physical activity     Days per week: Not on file     Minutes per session: Not on file     Stress: Not on file   Relationships     Social connections     Talks on phone: Not on file     Gets together: Not on file     Attends Voodoo service: Not on file     Active member of club or organization: Not on file     Attends meetings of clubs or organizations: Not on file     Relationship status: Not on file     Intimate partner violence     Fear of current or ex partner: Not on file     Emotionally abused: Not on file     Physically abused: Not on file     Forced sexual activity: Not on file   Other Topics Concern     Parent/sibling w/ CABG, MI or angioplasty before 65F 55M? No   Social History Narrative     Not on file       FAMILY HISTORY:  Family History   Problem Relation Age of Onset     Diabetes Mother          born 1928     Hypertension Mother      Obesity Mother      Cancer Paternal Grandfather         skin     Colon Cancer Maternal Grandfather         had in his late 80s     Substance Abuse Brother      Osteoporosis Paternal Grandmother        Past/family/social history reviewed and no changes    PHYSICAL EXAMINATION:  General appearance: Healthy appearing adult, in no acute distress  Eyes: Sclera anicteric, Pupils round and reactive to light  Ears, nose, mouth and throat: No obvious external lesions of ears and nose, Hearing intact  Neck: Symmetric, No obvious external lesions  Respiratory: Normal respiration, no use of accessory muscles   MSK: Gait normal  Skin: No rashes or jaundice   Psychiatric: Oriented to person, place and time, Appropriate mood and affect.     PERTINENT STUDIES:  Office Visit on 09/09/2020   Component Date Value Ref Range Status     Hemoglobin 09/09/2020 14.4  11.7 - 15.7 g/dL Final     Sodium 09/09/2020 141  133 - 144 mmol/L Final     Potassium 09/09/2020 4.2  3.4 - 5.3 mmol/L Final     Chloride 09/09/2020 107  94 - 109 mmol/L Final     Carbon Dioxide 09/09/2020 26  20 - 32 mmol/L Final     Anion Gap 09/09/2020 8  3 - 14 mmol/L Final     Glucose 09/09/2020 84  70 - 99 mg/dL Final     Urea Nitrogen 09/09/2020 13  7 - 30 mg/dL Final     Creatinine 09/09/2020 0.73  0.52 - 1.04 mg/dL Final     GFR Estimate 09/09/2020 84  >60 mL/min/[1.73_m2] Final     GFR Estimate If Black 09/09/2020 >90  >60 mL/min/[1.73_m2] Final     Calcium 09/09/2020 9.0  8.5 - 10.1 mg/dL Final     Bilirubin Total 09/09/2020 0.3  0.2 - 1.3 mg/dL Final     Albumin 09/09/2020 3.5  3.4 - 5.0 g/dL Final     Protein Total 09/09/2020 7.4  6.8 - 8.8 g/dL Final     Alkaline Phosphatase 09/09/2020 79  40 - 150 U/L Final     ALT 09/09/2020 62* 0 - 50 U/L Final     AST 09/09/2020 22  0 - 45 U/L Final     Cholesterol 09/09/2020 196  <200 mg/dL Final     Triglycerides 09/09/2020 100  <150 mg/dL Final     HDL Cholesterol 09/09/2020 71  >49  mg/dL Final     LDL Cholesterol Calculated 09/09/2020 105* <100 mg/dL Final     Non HDL Cholesterol 09/09/2020 125  <130 mg/dL Final     TSH 09/09/2020 0.79  0.40 - 4.00 mU/L Final

## 2020-10-13 NOTE — LETTER
10/13/2020       RE: Nita Trevizo  [address]  [facility] [address]     Dear Colleague,    Thank you for referring your patient, Nita Trevizo, to the [facility] GASTROENTEROLOGY [facility]INIC [place] at [facility]. Please see a copy of my visit note below.    GI CLINIC VISIT    CC/REFERRING MD:  Andrae Burns  REASON FOR CONSULTATION: IBS    ASSESSMENT/PLAN:  1.  Variable stool consistency/irregular stool pattern, abdominal pain  Patient reports variable stool consistency/pattern and abdominal pain with worsened episode of January this year, and symptoms again worsening in the past couple of months.  Does have longstanding history of irritable bowel syndrome.  Current worsening in symptoms likely due to exacerbation and irritable bowel syndrome, functional diarrhea, food triggers, or small intestinal bacterial overgrowth.  He has had negative infectious stool studies in January of this year at which time she had similar symptoms, had CT scan with diverticulosis without diverticulitis and worsening symptoms with treatment of presumed diverticulitis.  Is scheduled for screening colonoscopy later this week, have messaged endoscopist to add random biopsies for microscopic colitis for this procedure.  Discussed the mainstay of irregular stool pattern would be to use fiber, has had bad reaction with Metamucil in the past.  Would recommend trial of Citrucel starting of low and increased to effect.  We also discussed trial of Bentyl to help with abdominal cramping.  Can take sparingly.  May benefit from meeting with GI dietitian, will message to see if this is covered by her insurance.  Otherwise discussed that she should avoid carbonated beverages such as sparkling water and diet Pepsi.  May benefit from modified low FODMAP diet.  --Plan for colonoscopy  --Start Citrucel for fiber.  Start with 1 teaspoon a day and mixed in with a glass of water.   It can take your body a little bit of time to get used to this.  If you tolerate this after 2 weeks you can increase up to 2 teaspoons a day  --Start taking Bentyl as needed.  You can take this before meals or only when you have symptoms.  Monitor for potential side effects  --Avoid carbonated beverages such as sparkling water or Pepsi because this can cause increased bloating  --I will reach out to our dietitian   --Track food and potential triggers    RTC 4 to 6 weeks    Thank you for this consultation.  It was a pleasure to participate in the care of this patient; please contact us with any further questions. I spent a total of 32  minutes face-to-face (via video) with Nita Trevizo during today's office visit.  Over 50% of which was counseling/coordinating care regarding the above delineated issues. Please see note for details.     Note completed using voice recognition software. Some word and grammatical errors may occur.    Blanquita Grey PA-C  Division of Gastroenterology, Hepatology & Nutrition  [facility]      HPI  Nita Trevizo is a 70 year old woman with a past medical history of GERD, osteopenia, irritable bowel syndrome presenting for evaluation of irregular stool pattern and abdominal pain.  She is new to the [facility] gastroenterology clinic and this is my first encounter with the patient.    Patient reports longstanding history of GI symptoms, starting in January of this year she had severe abdominal pain.  He had a work-up notable for negative C. difficile, enteric panel, ova and parasite, and fecal leukocytes.  Had CT abdomen and pelvis 1/9/2020 at which time she was noted to have distal colonic diverticulosis without diverticulitis.  Was again seen the next day for her symptoms and was treated with a course of ciprofloxacin and Flagyl for presumed diverticulitis.  Patient reports he had a reaction to her antibiotics.  Symptoms then seem to improve with time,  "however returned recently.  Denies any changes in medication, diet, travel, sick contacts before her symptoms started.    Now the patient reports that he is having left lower quadrant abdominal pain, can sometimes be an aching type pain.  Cramping abdominal pain can proceed having a bowel movement.    The patient describes that her stool pattern is variable.  She will have on average diarrhea 2 times a week which she describes as a cluster of bowel movements within an hour where she will have cramping associated with loose/watery stools.  Had one episode of cramping that woke her up in the middle the night though did not have a bowel movement at this time.  About 2 times a week she will have \"pebble \"bowel movements associated with some cramping or dull ache.  She will not feel empty when this happens.  Will bloat in the evening.  Occasionally can skip 1 day without a bowel movement.  Will take MiraLAX as needed for constipation which can be up to a couple of times a week, otherwise will try to drink coffee to help have a bowel movement.  Denies any fecal incontinence    She has previously been on probiotics, digestive enzymes and collagen which did not make a difference in her symptoms.  Fiber was tried in the past with Metamucil which worsened abdominal cramping    GERD- has worsened, has NG pills she takes with esophageal spasm. More often in the evening. Has been on omeprazole back in January (2 weeks)- does not want to take because of osteopenia. Helped for a while. constantly belching and burping regardless of food triggers       Diet recall:  Breakfast: will have bagel with peanut butter and banana or toast, bowl of cereal with peaches (buys no sugar added). Gluten- free pancake, eggs occasionally.   Lunch: usually a sandwich   Dinner: fish, chicken, or hamburger   Drinks: coffee in the morning, will drink la croix (2 cans), not a big water drinker. Drinks a diet pepsi at night     Daughter and grandson with " celiac disease, no colorectal cancer, Crohn's, UC      EGD for dysphagia in 2011- normal small intestinal biopsies. Has been tested for celiac disease multiple times. Has also looked into low FODMAP diet.      ROS:    No fevers or chills  No weight loss (weight gain)  No blurry vision, double vision or change in vision  + sore throat- sinus drainage   No lymphadenopathy  No headache, paraesthesias, or weakness in a limb  No shortness of breath or wheezing  No chest pain or pressure  No arthralgias or myalgias   + cramping in feet   No rashes or skin changes  No odynophagia or dysphagia  No BRBPR, hematochezia, melena  No dysuria, frequency or urgency  No hot/cold intolerance or polyria  No anxiety or depression  + increased fatigue     PROBLEM LIST  Patient Active Problem List    Diagnosis Date Noted     Irritable bowel syndrome with both constipation and diarrhea 05/28/2020     Priority: Medium     Dry eyes, bilateral 10/12/2016     Priority: Medium     Osteopenia 08/01/2016     Priority: Medium     CARDIOVASCULAR SCREENING; LDL GOAL LESS THAN 130 08/01/2016     Priority: Medium     Esophageal reflux 06/27/2006     Priority: Medium     Chronic maxillary sinusitis 08/03/2004     Priority: Medium           PERTINENT PAST MEDICAL HISTORY:  Past Medical History:   Diagnosis Date     Arthritis      Disorder of bone and cartilage, unspecified      Esophageal reflux      Osteopenia 6/14    -1.5       PREVIOUS SURGERIES:  S/p cholecystectomy at age 26 (in the 70s)   tonsilltectomy   Past Surgical History:   Procedure Laterality Date     ABDOMEN SURGERY       C APPENDECTOMY       C LIGATE FALLOPIAN TUBE       C TOTAL ABDOM HYSTERECTOMY      for fibroids with BSO     COLONOSCOPY       GENITOURINARY SURGERY       [facility] REMOVAL GALLBLADDER       [facility] REMOVE TONSILS/ADENOIDS,<14 Y/O       ORTHOPEDIC SURGERY      wrist 2017 [facility]      VASCULAR SURGERY       [facility] NONSPECIFIC PROCEDURE      vein stripping x 3     PREVIOUS  "ENDOSCOPY:  Per above    ALLERGIES:  Allergies   Allergen Reactions     Ciprofloxacin      Rash, eyelids red puffy & itchy     Codeine      \"chest pain\"       PERTINENT MEDICATIONS:  Current Outpatient Medications:      Cetirizine HCl (ZYRTEC PO), , Disp: , Rfl:      COLLAGEN PO, , Disp: , Rfl:      IBUPROFEN 200 MG OR TABS, 2 TABLET EVERY 4 TO 6 HOURS AS NEEDED for headaches , Disp: , Rfl:      KRILL OIL PO, 250 mg capsules, Disp: , Rfl:      nitroGLYcerin (NITROSTAT) 0.4 MG sublingual tablet, Place 1 tablet (0.4 mg) under the tongue every 5 minutes as needed for chest pain If you are still having symptoms after 3 doses (15 minutes) call 911., Disp: 25 tablet, Rfl: 0     polyethylene glycol (MIRALAX/GLYCOLAX) powder, Take 1 capful by mouth as needed for constipation, Disp: , Rfl:      PROBIOTIC PRODUCT PO, Formagen digestive, Disp: , Rfl:      RESTASIS 0.05 % ophthalmic emulsion, INSTILL ONE DROP IN EACH EYE TWO TIMES A DAY, Disp: , Rfl: 3  Has been taking increased ibuprofen- maybe once a day (she had increased pain)    SOCIAL HISTORY:  No alcohol use, drug use   Social History     Socioeconomic History     Marital status:      Spouse name: Not on file     Number of children: Not on file     Years of education: Not on file     Highest education level: Not on file   Occupational History     Not on file   Social Needs     Financial resource strain: Not on file     Food insecurity     Worry: Not on file     Inability: Not on file     Transportation needs     Medical: Not on file     Non-medical: Not on file   Tobacco Use     Smoking status: Never Smoker     Smokeless tobacco: Never Used   Substance and Sexual Activity     Alcohol use: No     Alcohol/week: 0.0 standard drinks     Drug use: No     Sexual activity: Never   Lifestyle     Physical activity     Days per week: Not on file     Minutes per session: Not on file     Stress: Not on file   Relationships     Social connections     Talks on phone: Not on " file     Gets together: Not on file     Attends [employer] service: Not on file     Active member of club or organization: Not on file     Attends meetings of clubs or organizations: Not on file     Relationship status: Not on file     Intimate partner violence     Fear of current or ex partner: Not on file     Emotionally abused: Not on file     Physically abused: Not on file     Forced sexual activity: Not on file   Other Topics Concern     Parent/sibling w/ CABG, MI or angioplasty before 65F 55M? No   Social History Narrative     Not on file       FAMILY HISTORY:  Family History   Problem Relation Age of Onset     Diabetes Mother         born 1928     Hypertension Mother      Obesity Mother      Cancer Paternal Grandfather         skin     Colon Cancer Maternal Grandfather         had in his late 80s     Substance Abuse Brother      Osteoporosis Paternal Grandmother        Past/family/social history reviewed and no changes    PHYSICAL EXAMINATION:  General appearance: Healthy appearing adult, in no acute distress  Eyes: Sclera anicteric, Pupils round and reactive to light  Ears, nose, mouth and throat: No obvious external lesions of ears and nose, Hearing intact  Neck: Symmetric, No obvious external lesions  Respiratory: Normal respiration, no use of accessory muscles   MSK: Gait normal  Skin: No rashes or jaundice   Psychiatric: Oriented to person, place and time, Appropriate mood and affect.     PERTINENT STUDIES:  Office Visit on 09/09/2020   Component Date Value Ref Range Status     Hemoglobin 09/09/2020 14.4  11.7 - 15.7 g/dL Final     Sodium 09/09/2020 141  133 - 144 mmol/L Final     Potassium 09/09/2020 4.2  3.4 - 5.3 mmol/L Final     Chloride 09/09/2020 107  94 - 109 mmol/L Final     Carbon Dioxide 09/09/2020 26  20 - 32 mmol/L Final     Anion Gap 09/09/2020 8  3 - 14 mmol/L Final     Glucose 09/09/2020 84  70 - 99 mg/dL Final     Urea Nitrogen 09/09/2020 13  7 - 30 mg/dL Final     Creatinine 09/09/2020  0.73  0.52 - 1.04 mg/dL Final     GFR Estimate 09/09/2020 84  >60 mL/min/[1.73_m2] Final     GFR Estimate If Black 09/09/2020 >90  >60 mL/min/[1.73_m2] Final     Calcium 09/09/2020 9.0  8.5 - 10.1 mg/dL Final     Bilirubin Total 09/09/2020 0.3  0.2 - 1.3 mg/dL Final     Albumin 09/09/2020 3.5  3.4 - 5.0 g/dL Final     Protein Total 09/09/2020 7.4  6.8 - 8.8 g/dL Final     Alkaline Phosphatase 09/09/2020 79  40 - 150 U/L Final     ALT 09/09/2020 62* 0 - 50 U/L Final     AST 09/09/2020 22  0 - 45 U/L Final     Cholesterol 09/09/2020 196  <200 mg/dL Final     Triglycerides 09/09/2020 100  <150 mg/dL Final     HDL Cholesterol 09/09/2020 71  >49 mg/dL Final     LDL Cholesterol Calculated 09/09/2020 105* <100 mg/dL Final     Non HDL Cholesterol 09/09/2020 125  <130 mg/dL Final     TSH 09/09/2020 0.79  0.40 - 4.00 mU/L Final       Again, thank you for allowing me to participate in the care of your patient.  Sincerely,    Blanquita Grey PA-C

## 2020-10-13 NOTE — PATIENT INSTRUCTIONS
It was a pleasure taking care of you today.  I've included a brief summary of our discussion and care plan from today's visit below.  Please review this information with your primary care provider.  ______________________________________________________________________    My recommendations are summarized as follows:    --Plan for colonoscopy  --Start Citrucel for fiber.  Start with 1 teaspoon a day and mixed in with a glass of water.  It can take your body a little bit of time to get used to this.  If you tolerate this after 2 weeks you can increase up to 2 teaspoons a day  --Start taking Bentyl as needed.  You can take this before meals or only when you have symptoms.  Monitor for potential side effects  --Avoid carbonated beverages such as sparkling water or Pepsi because this can cause increased bloating  --I will reach out to our dietitian   --Track food and potential triggers    Return to GI Clinic in 4-6 weeks to review your progress.    ______________________________________________________________________    How do I schedule labs, imaging studies, or procedures that were ordered in clinic today?   Labs: To schedule lab appointment at the [facility], use my chart or call [phone]. If you have a [facility] lab closer to home where you are regularly seen you can give them a call.     Procedures: If a colonoscopy, upper endoscopy, breath test, esophageal manometry, or pH impedence was ordered today, our endoscopy team will call you to schedule this. If you have not heard from our endoscopy team within a week, please call [phone] to schedule.     Imaging Studies: If you were scheduled for a CT scan, X-ray, MRI, ultrasound, HIDA scan or other imaging study, please call [phone] to have this scheduled.     Referral: If a referral to another specialty was ordered, expect a phone call or follow instructions above. If you have not heard from anyone regarding your referral in a week,  please call our clinic to check the status.     Who do I call with any questions after my visit?  Please be in touch if there are any further questions that arise following today's visit.  There are multiple ways to contact your gastroenterology care team.        During business hours, you may reach a Gastroenterology nurse at [phone]      To schedule or reschedule an appointment, please call [phone].       You can always send a secure message through [employer].  [employer] messages are answered by your nurse or doctor typically within 24 hours.  Please allow extra time on weekends and holidays.        For urgent/emergent questions after business hours, you may reach the on-call GI Fellow by contacting the [facility]  at [phone].     How will I get the results of any tests ordered?    You will receive all of your results.  If you have signed up for [employer], any tests ordered at your visit will be available to you after your physician reviews them.  Typically this takes 1-2 weeks.  If there are urgent results that require a change in your care plan, your physician or nurse will call you to discuss the next steps.      What is [employer]?  [employer] is a secure way for you to access all of your healthcare records from the [facility].  It is a web based computer program, so you can sign on to it from any location.  It also allows you to send secure messages to your care team.  I recommend signing up for [employer] access if you have not already done so and are comfortable with using a computer.      How to I schedule a follow-up visit?  If you did not schedule a follow-up visit today, please call [phone] to schedule a follow-up office visit.      Sincerely,    Blanquita Grey PA-C  Division of Gastroenterology, Hepatology & Nutrition  [facility]

## 2020-10-13 NOTE — LETTER
"    10/13/2020         RE: Nita Trevizo  [address]  [facility] [address]        Dear Colleague,    Thank you for referring your patient, Nita Trevizo, to the [facility] [place]. Please see a copy of my visit note below.    Nita Trevizo is a 70 year old female who is being evaluated via a billable video visit.      The patient has been notified of following:     \"This video visit will be conducted via a call between you and your physician/provider. We have found that certain health care needs can be provided without the need for an in-person physical exam.  This service lets us provide the care you need with a video conversation.  If a prescription is necessary we can send it directly to your pharmacy.  If lab work is needed we can place an order for that and you can then stop by our lab to have the test done at a later time.    Video visits are billed at different rates depending on your insurance coverage.  Please reach out to your insurance provider with any questions.    If during the course of the call the physician/provider feels a video visit is not appropriate, you will not be charged for this service.\"    Patient has given verbal consent for Video visit? Yes  How would you like to obtain your AVS? MyChart  If you are dropped from the video visit, the video invite should be resent to: Text to cell phone: [phone]  Will anyone else be joining your video visit? No   During this virtual visit the patient is located in [place], patient verifies this as the location during the entirety of this visit.     Video-Visit Details    Type of service:  Video Visit    Video Start Time: 2:02 pm  Video End Time: 2:34 pm    Originating Location (pt. Location): Home    Distant Location (provider location):  [facility] [place]     Platform used for Video Visit: Jose Grey PA-C    GI CLINIC VISIT    CC/REFERRING " MD:  Andrae Burns  REASON FOR CONSULTATION: IBS    ASSESSMENT/PLAN:  1.  Variable stool consistency/irregular stool pattern, abdominal pain  Patient reports variable stool consistency/pattern and abdominal pain with worsened episode of January this year, and symptoms again worsening in the past couple of months.  Does have longstanding history of irritable bowel syndrome.  Current worsening in symptoms likely due to exacerbation and irritable bowel syndrome, functional diarrhea, food triggers, or small intestinal bacterial overgrowth.  He has had negative infectious stool studies in January of this year at which time she had similar symptoms, had CT scan with diverticulosis without diverticulitis and worsening symptoms with treatment of presumed diverticulitis.  Is scheduled for screening colonoscopy later this week, have messaged endoscopist to add random biopsies for microscopic colitis for this procedure.  Discussed the mainstay of irregular stool pattern would be to use fiber, has had bad reaction with Metamucil in the past.  Would recommend trial of Citrucel starting of low and increased to effect.  We also discussed trial of Bentyl to help with abdominal cramping.  Can take sparingly.  May benefit from meeting with GI dietitian, will message to see if this is covered by her insurance.  Otherwise discussed that she should avoid carbonated beverages such as sparkling water and diet Pepsi.  May benefit from modified low FODMAP diet.  --Plan for colonoscopy  --Start Citrucel for fiber.  Start with 1 teaspoon a day and mixed in with a glass of water.  It can take your body a little bit of time to get used to this.  If you tolerate this after 2 weeks you can increase up to 2 teaspoons a day  --Start taking Bentyl as needed.  You can take this before meals or only when you have symptoms.  Monitor for potential side effects  --Avoid carbonated beverages such as sparkling water or Pepsi because this can  cause increased bloating  --I will reach out to our dietitian   --Track food and potential triggers    RTC 4 to 6 weeks    Thank you for this consultation.  It was a pleasure to participate in the care of this patient; please contact us with any further questions. I spent a total of 32  minutes face-to-face (via video) with Nita Trevizo during today's office visit.  Over 50% of which was counseling/coordinating care regarding the above delineated issues. Please see note for details.     Note completed using voice recognition software. Some word and grammatical errors may occur.      Blanquita Grey PA-C  Division of Gastroenterology, Hepatology & Nutrition  [facility]        HPI  Nita Trevizo is a 70 year old woman with a past medical history of GERD, osteopenia, irritable bowel syndrome presenting for evaluation of irregular stool pattern and abdominal pain.  She is new to the [facility] gastroenterology clinic and this is my first encounter with the patient.    Patient reports longstanding history of GI symptoms, starting in January of this year she had severe abdominal pain.  He had a work-up notable for negative C. difficile, enteric panel, ova and parasite, and fecal leukocytes.  Had CT abdomen and pelvis 1/9/2020 at which time she was noted to have distal colonic diverticulosis without diverticulitis.  Was again seen the next day for her symptoms and was treated with a course of ciprofloxacin and Flagyl for presumed diverticulitis.  Patient reports he had a reaction to her antibiotics.  Symptoms then seem to improve with time, however returned recently.  Denies any changes in medication, diet, travel, sick contacts before her symptoms started.    Now the patient reports that he is having left lower quadrant abdominal pain, can sometimes be an aching type pain.  Cramping abdominal pain can proceed having a bowel movement.    The patient describes that her stool pattern is  "variable.  She will have on average diarrhea 2 times a week which she describes as a cluster of bowel movements within an hour where she will have cramping associated with loose/watery stools.  Had one episode of cramping that woke her up in the middle the night though did not have a bowel movement at this time.  About 2 times a week she will have \"pebble \"bowel movements associated with some cramping or dull ache.  She will not feel empty when this happens.  Will bloat in the evening.  Occasionally can skip 1 day without a bowel movement.  Will take MiraLAX as needed for constipation which can be up to a couple of times a week, otherwise will try to drink coffee to help have a bowel movement.  Denies any fecal incontinence    She has previously been on probiotics, digestive enzymes and collagen which did not make a difference in her symptoms.  Fiber was tried in the past with Metamucil which worsened abdominal cramping    GERD- has worsened, has NG pills she takes with esophageal spasm. More often in the evening. Has been on omeprazole back in January (2 weeks)- does not want to take because of osteopenia. Helped for a while. constantly belching and burping regardless of food triggers     Diet recall:  Breakfast: will have bagel with peanut butter and banana or toast, bowl of cereal with peaches (buys no sugar added). Gluten- free pancake, eggs occasionally.   Lunch: usually a sandwich   Dinner: fish, chicken, or hamburger   Drinks: coffee in the morning, will drink la croix (2 cans), not a big water drinker. Drinks a diet pepsi at night     Daughter and grandson with celiac disease, no colorectal cancer, Crohn's, UC      EGD for dysphagia in 2011- normal small intestinal biopsies. Has been tested for celiac disease multiple times. Has also looked into low FODMAP diet.      ROS:    No fevers or chills  No weight loss (weight gain)  No blurry vision, double vision or change in vision  + sore throat- sinus drainage " "  No lymphadenopathy  No headache, paraesthesias, or weakness in a limb  No shortness of breath or wheezing  No chest pain or pressure  No arthralgias or myalgias   + cramping in feet   No rashes or skin changes  No odynophagia or dysphagia  No BRBPR, hematochezia, melena  No dysuria, frequency or urgency  No hot/cold intolerance or polyria  No anxiety or depression  + increased fatigue     PROBLEM LIST  Patient Active Problem List    Diagnosis Date Noted     Irritable bowel syndrome with both constipation and diarrhea 05/28/2020     Priority: Medium     Dry eyes, bilateral 10/12/2016     Priority: Medium     Osteopenia 08/01/2016     Priority: Medium     CARDIOVASCULAR SCREENING; LDL GOAL LESS THAN 130 08/01/2016     Priority: Medium     Esophageal reflux 06/27/2006     Priority: Medium     Chronic maxillary sinusitis 08/03/2004     Priority: Medium       PERTINENT PAST MEDICAL HISTORY:  Past Medical History:   Diagnosis Date     Arthritis      Disorder of bone and cartilage, unspecified      Esophageal reflux      Osteopenia 6/14    -1.5       PREVIOUS SURGERIES:  S/p cholecystectomy at age 26 (in the 70s)   tonsilltectomy   Past Surgical History:   Procedure Laterality Date     ABDOMEN SURGERY       C APPENDECTOMY       C LIGATE FALLOPIAN TUBE       C TOTAL ABDOM HYSTERECTOMY      for fibroids with BSO     COLONOSCOPY       GENITOURINARY SURGERY       HC REMOVAL GALLBLADDER       HC REMOVE TONSILS/ADENOIDS,<11 Y/O       ORTHOPEDIC SURGERY      wrist 2017 [facility]      VASCULAR SURGERY       Z NONSPECIFIC PROCEDURE      vein stripping x 3       PREVIOUS ENDOSCOPY:  Per above    ALLERGIES:     Allergies   Allergen Reactions     Ciprofloxacin      Rash, eyelids red puffy & itchy     Codeine      \"chest pain\"       PERTINENT MEDICATIONS:    Current Outpatient Medications:      Cetirizine HCl (ZYRTEC PO), , Disp: , Rfl:      COLLAGEN PO, , Disp: , Rfl:      IBUPROFEN 200 MG OR TABS, 2 TABLET EVERY 4 TO 6 HOURS AS " NEEDED for headaches , Disp: , Rfl:      KRILL OIL PO, 250 mg capsules, Disp: , Rfl:      nitroGLYcerin (NITROSTAT) 0.4 MG sublingual tablet, Place 1 tablet (0.4 mg) under the tongue every 5 minutes as needed for chest pain If you are still having symptoms after 3 doses (15 minutes) call 911., Disp: 25 tablet, Rfl: 0     polyethylene glycol (MIRALAX/GLYCOLAX) powder, Take 1 capful by mouth as needed for constipation, Disp: , Rfl:      PROBIOTIC PRODUCT PO, Formagen digestive, Disp: , Rfl:      RESTASIS 0.05 % ophthalmic emulsion, INSTILL ONE DROP IN EACH EYE TWO TIMES A DAY, Disp: , Rfl: 3  Has been taking increased ibuprofen- maybe once a day (she had increased pain)    SOCIAL HISTORY:  No alcohol use, drug use   Social History     Socioeconomic History     Marital status:      Spouse name: Not on file     Number of children: Not on file     Years of education: Not on file     Highest education level: Not on file   Occupational History     Not on file   Social Needs     Financial resource strain: Not on file     Food insecurity     Worry: Not on file     Inability: Not on file     Transportation needs     Medical: Not on file     Non-medical: Not on file   Tobacco Use     Smoking status: Never Smoker     Smokeless tobacco: Never Used   Substance and Sexual Activity     Alcohol use: No     Alcohol/week: 0.0 standard drinks     Drug use: No     Sexual activity: Never   Lifestyle     Physical activity     Days per week: Not on file     Minutes per session: Not on file     Stress: Not on file   Relationships     Social connections     Talks on phone: Not on file     Gets together: Not on file     Attends [employer] service: Not on file     Active member of club or organization: Not on file     Attends meetings of clubs or organizations: Not on file     Relationship status: Not on file     Intimate partner violence     Fear of current or ex partner: Not on file     Emotionally abused: Not on file     Physically  abused: Not on file     Forced sexual activity: Not on file   Other Topics Concern     Parent/sibling w/ CABG, MI or angioplasty before 65F 55M? No   Social History Narrative     Not on file       FAMILY HISTORY:  Family History   Problem Relation Age of Onset     Diabetes Mother         born 1928     Hypertension Mother      Obesity Mother      Cancer Paternal Grandfather         skin     Colon Cancer Maternal Grandfather         had in his late 80s     Substance Abuse Brother      Osteoporosis Paternal Grandmother        Past/family/social history reviewed and no changes    PHYSICAL EXAMINATION:  General appearance: Healthy appearing adult, in no acute distress  Eyes: Sclera anicteric, Pupils round and reactive to light  Ears, nose, mouth and throat: No obvious external lesions of ears and nose, Hearing intact  Neck: Symmetric, No obvious external lesions  Respiratory: Normal respiration, no use of accessory muscles   MSK: Gait normal  Skin: No rashes or jaundice   Psychiatric: Oriented to person, place and time, Appropriate mood and affect.     PERTINENT STUDIES:  Office Visit on 09/09/2020   Component Date Value Ref Range Status     Hemoglobin 09/09/2020 14.4  11.7 - 15.7 g/dL Final     Sodium 09/09/2020 141  133 - 144 mmol/L Final     Potassium 09/09/2020 4.2  3.4 - 5.3 mmol/L Final     Chloride 09/09/2020 107  94 - 109 mmol/L Final     Carbon Dioxide 09/09/2020 26  20 - 32 mmol/L Final     Anion Gap 09/09/2020 8  3 - 14 mmol/L Final     Glucose 09/09/2020 84  70 - 99 mg/dL Final     Urea Nitrogen 09/09/2020 13  7 - 30 mg/dL Final     Creatinine 09/09/2020 0.73  0.52 - 1.04 mg/dL Final     GFR Estimate 09/09/2020 84  >60 mL/min/[1.73_m2] Final     GFR Estimate If Black 09/09/2020 >90  >60 mL/min/[1.73_m2] Final     Calcium 09/09/2020 9.0  8.5 - 10.1 mg/dL Final     Bilirubin Total 09/09/2020 0.3  0.2 - 1.3 mg/dL Final     Albumin 09/09/2020 3.5  3.4 - 5.0 g/dL Final     Protein Total 09/09/2020 7.4  6.8 - 8.8  g/dL Final     Alkaline Phosphatase 09/09/2020 79  40 - 150 U/L Final     ALT 09/09/2020 62* 0 - 50 U/L Final     AST 09/09/2020 22  0 - 45 U/L Final     Cholesterol 09/09/2020 196  <200 mg/dL Final     Triglycerides 09/09/2020 100  <150 mg/dL Final     HDL Cholesterol 09/09/2020 71  >49 mg/dL Final     LDL Cholesterol Calculated 09/09/2020 105* <100 mg/dL Final     Non HDL Cholesterol 09/09/2020 125  <130 mg/dL Final     TSH 09/09/2020 0.79  0.40 - 4.00 mU/L Final             Again, thank you for allowing me to participate in the care of your patient.        Sincerely,        Blanquita Grey PA-C

## 2020-10-13 NOTE — NURSING NOTE
"Chief Complaint   Patient presents with     RECHECK     Follow up appointment.       Vitals:    10/13/20 1302   Weight: 67.6 kg (149 lb)   Height: 1.657 m (5' 5.25\")       Body mass index is 24.61 kg/m .                            PRINCESS MUÑOZ, EMT    "

## 2020-10-14 ENCOUNTER — TELEPHONE (OUTPATIENT)
Dept: GASTROENTEROLOGY | Facility: CLINIC | Age: 70
End: 2020-10-14

## 2020-10-14 LAB
ALBUMIN SERPL-MCNC: 3.8 G/DL (ref 3.4–5)
ALP SERPL-CCNC: 80 U/L (ref 40–150)
ALT SERPL W P-5'-P-CCNC: 77 U/L (ref 0–50)
AST SERPL W P-5'-P-CCNC: 35 U/L (ref 0–45)
BILIRUB DIRECT SERPL-MCNC: 0.1 MG/DL (ref 0–0.2)
BILIRUB SERPL-MCNC: 0.4 MG/DL (ref 0.2–1.3)
PROT SERPL-MCNC: 7.6 G/DL (ref 6.8–8.8)

## 2020-10-14 NOTE — TELEPHONE ENCOUNTER
[facility] Call Center    Phone Message    May a detailed message be left on voicemail: yes     Reason for Call: Other: Patient was prescribed medication yesterday and read up and noticed one of the side effects is constipation and she has big concerns.  Please call to discuss before she starts taking it.      Action Taken: Message routed to:  [facility] ([facility] GI    Travel Screening: Not Applicable

## 2020-10-15 LAB
SARS-COV-2 RNA SPEC QL NAA+PROBE: NOT DETECTED
SPECIMEN SOURCE: NORMAL

## 2020-10-16 ENCOUNTER — HOSPITAL ENCOUNTER (OUTPATIENT)
Facility: CLINIC | Age: 70
Discharge: HOME | End: 2020-10-16
Attending: INTERNAL MEDICINE | Admitting: INTERNAL MEDICINE
Payer: COMMERCIAL

## 2020-10-16 VITALS
DIASTOLIC BLOOD PRESSURE: 72 MMHG | SYSTOLIC BLOOD PRESSURE: 119 MMHG | HEART RATE: 71 BPM | RESPIRATION RATE: 16 BRPM | OXYGEN SATURATION: 98 %

## 2020-10-16 LAB — COLONOSCOPY: NORMAL

## 2020-10-16 PROCEDURE — G0500 MOD SEDAT ENDO SERVICE >5YRS: HCPCS | Performed by: INTERNAL MEDICINE

## 2020-10-16 PROCEDURE — 250N000011 HC RX IP 250 OP 636: Performed by: INTERNAL MEDICINE

## 2020-10-16 PROCEDURE — 45380 COLONOSCOPY AND BIOPSY: CPT | Performed by: INTERNAL MEDICINE

## 2020-10-16 PROCEDURE — 88305 TISSUE EXAM BY PATHOLOGIST: CPT | Mod: 26 | Performed by: PATHOLOGY

## 2020-10-16 PROCEDURE — 88305 TISSUE EXAM BY PATHOLOGIST: CPT | Mod: TC | Performed by: INTERNAL MEDICINE

## 2020-10-16 PROCEDURE — 99152 MOD SED SAME PHYS/QHP 5/>YRS: CPT | Performed by: INTERNAL MEDICINE

## 2020-10-16 RX ORDER — ONDANSETRON 2 MG/ML
4 INJECTION INTRAMUSCULAR; INTRAVENOUS EVERY 6 HOURS PRN
Status: DISCONTINUED | OUTPATIENT
Start: 2020-10-16 | End: 2020-10-16 | Stop reason: HOSPADM

## 2020-10-16 RX ORDER — ONDANSETRON 4 MG/1
4 TABLET, ORALLY DISINTEGRATING ORAL EVERY 6 HOURS PRN
Status: DISCONTINUED | OUTPATIENT
Start: 2020-10-16 | End: 2020-10-16 | Stop reason: HOSPADM

## 2020-10-16 RX ORDER — FLUMAZENIL 0.1 MG/ML
0.2 INJECTION, SOLUTION INTRAVENOUS
Status: DISCONTINUED | OUTPATIENT
Start: 2020-10-16 | End: 2020-10-16 | Stop reason: HOSPADM

## 2020-10-16 RX ORDER — NALOXONE HYDROCHLORIDE 0.4 MG/ML
.1-.4 INJECTION, SOLUTION INTRAMUSCULAR; INTRAVENOUS; SUBCUTANEOUS
Status: DISCONTINUED | OUTPATIENT
Start: 2020-10-16 | End: 2020-10-16 | Stop reason: HOSPADM

## 2020-10-16 RX ORDER — LIDOCAINE 40 MG/G
CREAM TOPICAL
Status: DISCONTINUED | OUTPATIENT
Start: 2020-10-16 | End: 2020-10-16 | Stop reason: HOSPADM

## 2020-10-16 RX ORDER — PROCHLORPERAZINE MALEATE 5 MG
5 TABLET ORAL EVERY 6 HOURS PRN
Status: DISCONTINUED | OUTPATIENT
Start: 2020-10-16 | End: 2020-10-16 | Stop reason: HOSPADM

## 2020-10-16 RX ORDER — ONDANSETRON 2 MG/ML
4 INJECTION INTRAMUSCULAR; INTRAVENOUS
Status: DISCONTINUED | OUTPATIENT
Start: 2020-10-16 | End: 2020-10-16 | Stop reason: HOSPADM

## 2020-10-16 RX ORDER — FENTANYL CITRATE 50 UG/ML
INJECTION, SOLUTION INTRAMUSCULAR; INTRAVENOUS PRN
Status: DISCONTINUED | OUTPATIENT
Start: 2020-10-16 | End: 2020-10-16 | Stop reason: HOSPADM

## 2020-10-16 NOTE — H&P
Pre-Endoscopy History and Physical     Nita Trevizo MRN# [id number]   YOB: 1950 Age: 70 year old     Date of Procedure: 10/16/2020  Primary care provider: Andrae Burns  Type of Endoscopy: Colonoscopy with possible biopsy, possible polypectomy  Reason for Procedure: screen  Type of Anesthesia Anticipated: Conscious Sedation    HPI:    Nita is a 70 year old female who will be undergoing the above procedure.      A history and physical has been performed. The patient's medications and allergies have been reviewed. The risks and benefits of the procedure and the sedation options and risks were discussed with the patient.  All questions were answered and informed consent was obtained.      She denies a personal or family history of anesthesia complications or bleeding disorders.     Patient Active Problem List   Diagnosis     Chronic maxillary sinusitis     Esophageal reflux     Osteopenia     CARDIOVASCULAR SCREENING; LDL GOAL LESS THAN 130     Dry eyes, bilateral     Irritable bowel syndrome with both constipation and diarrhea        Past Medical History:   Diagnosis Date     Arthritis      Disorder of bone and cartilage, unspecified      Esophageal reflux      Osteopenia 6/14    -1.5        Past Surgical History:   Procedure Laterality Date     ABDOMEN SURGERY       C APPENDECTOMY       C LIGATE FALLOPIAN TUBE       C TOTAL ABDOM HYSTERECTOMY      for fibroids with BSO     COLONOSCOPY       GENITOURINARY SURGERY       HC REMOVAL GALLBLADDER       HC REMOVE TONSILS/ADENOIDS,<13 Y/O       ORTHOPEDIC SURGERY      wrist 2017 [facility]      VASCULAR SURGERY       ZZC NONSPECIFIC PROCEDURE      vein stripping x 3       Social History     Tobacco Use     Smoking status: Never Smoker     Smokeless tobacco: Never Used   Substance Use Topics     Alcohol use: No     Alcohol/week: 0.0 standard drinks       Family History   Problem Relation Age of Onset     Diabetes Mother         born 1928      "Hypertension Mother      Obesity Mother      Cancer Paternal Grandfather         skin     Colon Cancer Maternal Grandfather         had in his late 80s     Substance Abuse Brother      Osteoporosis Paternal Grandmother        Prior to Admission medications    Medication Sig Start Date End Date Taking? Authorizing Provider   Cetirizine HCl (ZYRTEC PO)    Yes Reported, Patient   RESTASIS 0.05 % ophthalmic emulsion INSTILL ONE DROP IN EACH EYE TWO TIMES A DAY 10/16/19  Yes Reported, Patient   COLLAGEN PO     Reported, Patient   dicyclomine (BENTYL) 10 MG capsule Take 1 capsule (10 mg) by mouth 4 times daily as needed (up to four times a day as needed for abdominal pain and cramping) 10/13/20   Blanquita Grey PA-C   IBUPROFEN 200 MG OR TABS 2 TABLET EVERY 4 TO 6 HOURS AS NEEDED for headaches     Reported, Patient   KRILL OIL  mg capsules    Reported, Patient   nitroGLYcerin (NITROSTAT) 0.4 MG sublingual tablet Place 1 tablet (0.4 mg) under the tongue every 5 minutes as needed for chest pain If you are still having symptoms after 3 doses (15 minutes) call 911. 3/12/18   Deidre Lowry, MARCELA   polyethylene glycol (MIRALAX/GLYCOLAX) powder Take 1 capful by mouth as needed for constipation    Reported, Patient   PROBIOTIC PRODUCT PO Formagen digestive    Reported, Patient       Allergies   Allergen Reactions     Ciprofloxacin      Rash, eyelids red puffy & itchy     Codeine      \"chest pain\"        REVIEW OF SYSTEMS:   5 point ROS negative except as noted above in HPI, including Gen., Resp., CV, GI &  system review.    PHYSICAL EXAM:   There were no vitals taken for this visit. Estimated body mass index is 24.61 kg/m  as calculated from the following:    Height as of 10/13/20: 1.657 m (5' 5.25\").    Weight as of 10/13/20: 67.6 kg (149 lb).   GENERAL APPEARANCE: alert, and oriented  MENTAL STATUS: alert  AIRWAY EXAM: Mallampatti Class I (visualization of the soft palate, fauces, uvula, anterior and " posterior pillars)  RESP: lungs clear to auscultation - no rales, rhonchi or wheezes  CV: regular rates and rhythm  DIAGNOSTICS:    Not indicated    IMPRESSION   ASA Class 2 - Mild systemic disease    PLAN:   Plan for Colonoscopy with possible biopsy, possible polypectomy. We discussed the risks, benefits and alternatives and the patient wished to proceed.    The above has been forwarded to the consulting provider.      Signed Electronically by: Louie Patiño MD  October 16, 2020

## 2020-10-16 NOTE — DISCHARGE INSTRUCTIONS

## 2020-10-16 NOTE — LETTER
September 25, 2020      Nita Trevizo  [address]  [facility] [address]        Dear Nita,     Please be aware that coverage of these services is subject to the terms and limitations of your health insurance plan.  Call member services at your health plan with any benefit or coverage questions.    Thank you for choosing [facility]. You are scheduled for the following service(s):    Date:  10/16/2020  Friday             Procedure:  COLONOSCOPY  Doctor:        Dr. Patiño   Arrival Time:  8:00 am *Enter and check in at the Main Hospital Entrance*  Procedure Time:  8:30 am      Location:   [facility]        Endoscopy Department, First Floor         [address] [place], [place] [address]      [phone] or [phone] ([facility]) to reschedule          MIRALAX -GATORADE  PREP  Colonoscopy is the most accurate test to detect colon polyps and colon cancer; and the only test where polyps can be removed. During this procedure, a doctor examines the lining of your large intestine and rectum through a flexible tube.   Transportation  You must arrange for a ride for the day of your procedure with a responsible adult. A taxi , Uber, etc, is not an option unless you are accompanied by a responsible adult. If you fail to arrange transportation with a responsible adult, your procedure will be cancelled and rescheduled.    Purchase the  following supplies at your local pharmacy:    - 1 (one) 8.3 oz bottle of Polyethylene Glycol (PEG) 3350 Powder   (MiraLAX , Smooth LAX , ClearLAX  or equivalent)  - 64 oz Gatorade    Regular Gatorade, Gatorade G2 , Powerade , Powerade Zero  or Pedialyte  is acceptable. Red colored flavors are not allowed; all other colors (yellow, green, orange, purple and blue) are okay. It is also okay to buy two 2.12 oz packets of powdered Gatorade that can be mixed with water to a total volume of 64 oz of liquid.  - 1 (one) 10 oz  bottle of Magnesium Citrate (Red colored flavors are not allowed)  It is also okay for you to use a 0.5 oz package of powdered magnesium citrate (17 g) mixed with 10 oz of water.      PREPARATION FOR COLONOSCOPY    7 days before:    Discontinue fiber supplements and medications containing iron. This includes Metamucil  and Fibercon ; and multivitamins with iron.    3 days before:    Begin a low-fiber diet. A low-fiber diet helps making the cleanout more effective.     Examples of a low-fiber diet include (but are not limited to): white bread, white rice, pasta, crackers, fish, chicken, eggs, ground beef, creamy peanut butter, cooked/steamed/boiled vegetables, canned fruit, bananas, melons, milk, plain yogurt cheese, salad dressing and other condiments.     The following are not allowed on a low-fiber diet: seeds, nuts, popcorn, bran, whole wheat, corn, quinoa, raw fruits and vegetables, berries and dried fruit, beans and lentils.    For additional details on low-fiber diet, please refer to the table on the last page.    2 days before:    Continue the low-fiber diet.     Drink at least 8 glasses of water throughout the day.     Stop eating solid foods at 11:45 pm.  1 day before:    In the morning: begin a clear liquid diet (liquids you can see through).     Examples of a clear liquid diet include: water, clear broth or bouillon, Gatorade, Pedialyte or Powerade, carbonated and non-carbonated soft drinks (Sprite , 7-Up , ginger ale), strained fruit juices without pulp (apple, white grape, white cranberry), Jell-O  and popsicles.     The following are not allowed on a clear liquid diet: red liquids, alcoholic beverages, dairy products (milk, creamer, and yogurt), protein shakes, creamy broths, juice with pulp and chewing tobacco.    At noon: take 2 (two) bisacodyl tablets     At 4 (and no later than 6pm): start drinking the Miralax-Gatorade preparation (8.3 oz of Miralax mixed with 64 oz of Gatorade in a large pitcher).  Drink 1(one) 8 oz glass every 15 minutes thereafter, until the mixture is gone.    COLON CLEANSING TIPS: drink adequate amounts of fluids before and after your colon cleansing to prevent dehydration. Stay near a toilet because you will have diarrhea. Even if you are sitting on the toilet, continue to drink the cleansing solution every 15 minutes. If you feel nauseous or vomit, rinse your mouth with water, take a 15 to 30-minute-break and then continue drinking the solution. You will be uncomfortable until the stool has flushed from your colon (in about 2 to 4 hours). You may feel chilled.    Day of your procedure  You may take all of your morning medications including blood pressure medications, blood thinners (if you have not been instructed to stop these by our office), methadone, anti-seizure medications with sips of water 3 hours prior to your procedure or earlier. Do not take insulin or vitamins prior to your procedure. Continue the clear liquid diet.   4 hours prior: drink 10 oz of magnesium citrate. It may be easier to drink it with a straw.    STOP consuming all liquids after that.     Do not take anything by mouth during this time.     Allow extra time to travel to your procedure as you may need to stop and use a restroom along the way.    You are ready for the procedure, if you followed all instructions and your stool is no longer formed, but clear or yellow liquid. If you are unsure whether your colon is clean, please call our office at [phone] before you leave for your appointment.    Bring the following to your procedure:  - Insurance Card/Photo ID.   - List of current medications including over-the-counter medications and supplements.   - Your rescue inhaler if you currently use one to control asthma.    Canceling or rescheduling your appointment:   If you must cancel or reschedule your appointment, please call [phone] as soon as possible.      COLONOSCOPY PRE-PROCEDURE CHECKLIST    If you  have diabetes, ask your regular doctor for diet and medication restrictions.  If you take an anticoagulant or anti-platelet medication (such as Coumadin , Lovenox , Pradaxa , Xarelto , Eliquis , etc.), please call your primary doctor for advice on holding this medication.  If you take aspirin you may continue to do so.  If you are or may be pregnant, please discuss the risks and benefits of this procedure with your doctor.      What happens during a colonoscopy?    Plan to spend up to two hours, starting at registration time, at the endoscopy center the day of your procedure. The colonoscopy takes an average of 15 to 30 minutes. Recovery time is about 30 minutes.      Before the exam:    You will change into a gown.    Your medical history and medication list will be reviewed with you, unless that has been done over the phone prior to the procedure.     A nurse will insert an intravenous (IV) line into your hand or arm.    The doctor will meet with you and will give you a consent form to sign.  During the exam:     Medicine will be given through the IV line to help you relax.     Your heart rate and oxygen levels will be monitored. If your blood pressure is low, you may be given fluids through the IV line.     The doctor will insert a flexible hollow tube, called a colonoscope, into your rectum. The scope will be advanced slowly through the large intestine (colon).    You may have a feeling of fullness or pressure.     If an abnormal tissue or a polyp is found, the doctor may remove it through the endoscope for closer examination, or biopsy. Tissue removal is painless    After the exam:           Any tissue samples removed during the exam will be sent to a lab for evaluation. It may take 5-7 working days for you to be notified of the results.     A nurse will provide you with complete discharge instructions before you leave the endoscopy center. Be sure to ask the nurse for specific instructions if you take blood  thinners such as Aspirin, Coumadin or Plavix.     The doctor will prepare a full report for you and for the physician who referred you for the procedure.     Your doctor will talk with you about the initial results of your exam.      Medication given during the exam will prohibit you from driving for the rest of the day.     Following the exam, you may resume your normal diet. Your first meal should be light, no greasy foods. Avoid alcohol until the next day.     You may resume your regular activities the day after the procedure.       LOW-FIBER DIET    Foods RECOMMENDED Foods to AVOID   Breads, Cereal, Rice and Pasta:   White bread, rolls, biscuits, croissant and jessica toast.   Waffles, Indonesian toast and pancakes.   White rice, noodles, pasta, macaroni and peeled cooked potatoes.   Plain crackers and saltines.   Cooked cereals: farina, cream of rice.   Cold cereals: Puffed Rice , Rice Krispies , Corn Flakes  and Special K    Breads, Cereal, Rice and Pasta:   Breads or rolls with nuts, seeds or fruit.   Whole wheat, pumpernickel, rye breads and cornbread.   Potatoes with skin, brown or wild rice, and kasha (buckwheat).     Vegetables:   Tender cooked and canned vegetables without seeds: carrots, asparagus tips, green or wax beans, pumpkin, spinach, lima beans. Vegetables:   Raw or steamed vegetables.   Vegetables with seeds.   Sauerkraut.   Winter squash, peas, broccoli, Brussel sprouts, cabbage, onions, cauliflower, baked beans, peas and corn.   Fruits:   Strained fruit juice.   Canned fruit, except pineapple.   Ripe bananas and melon. Fruits:   Prunes and prune juice.   Raw fruits.   Dried fruits: figs, dates and raisins.   Milk/Dairy:   Milk: plain or flavored.   Yogurt, custard and ice cream.   Cheese and cottage cheese Milk/Dairy:     Meat and other proteins:   ground, well-cooked tender beef, lamb, ham, veal, pork, fish, poultry and organ meats.   Eggs.   Peanut butter without nuts. Meat and other  proteins:   Tough, fibrous meats with gristle.   Dry beans, peas and lentils.   Peanut butter with nuts.   Tofu.   Fats, Snack, Sweets, Condiments and Beverages:   Margarine, butter, oils, mayonnaise, sour cream and salad dressing, plain gravy.   Sugar, hard candy, clear jelly, honey and syrup.   Spices, cooked herbs, bouillon, broth and soups made with allowed vegetable, ketchup and mustard.   Coffee, tea and carbonated drinks.   Plain cakes, cookies and pretzels.   Gelatin, plain puddings, custard, ice cream, sherbet and popsicles. Fats, Snack, Sweets, Condiments and Beverages:   Nuts, seeds and coconut.   Jam, marmalade and preserves.   Pickles, olives, relish and horseradish.   All desserts containing nuts, seeds, dried fruit and coconut; or made from whole grains or bran.   Candy made with nuts or seeds.   Popcorn.     DIRECTIONS TO THE ENDOSCOPY DEPARTMENT    From the north ([facility])  Take [address] South, exit on [address] [address]. Get into the left hand ashlyn, turn left (east), go one-half mile to [address] and turn left. Go north to the second stoplight, take a right on [address] and follow it to the Main Hospital entrance.    From the south ([facility])  Take [address]N to the [address] split and exit on [address] [address]. On [address] [address], turn left (west) to [address]. Turn right (north) on [address]. Go north to the second stoplight, take a right on [address] and follow it to the Main Hospital entrance.    From the east via 35E ([facility])  Take [address] south to [address] [address] exit. Turn right on [address] . Go west to [address]. Turn right (north) on [address]. Go to the second stoplight, take a right on [address] to the Main Hospital entrance.    From the east via [address] ([facility])  Take [address] to [address]. Turn left (south) on [address] to [address], turn left (east) on [address] and follow it to the Main Hospital entrance.    From the west via [address] ([place] [facility])  Take [address] east to [address]. Turn right (south) on [address] to [address], turn left (east) on [address] and follow it to the Main Hospital entrance.

## 2020-10-16 NOTE — TELEPHONE ENCOUNTER
Called and spoke with patient regarding medication question.  Discussed that Bentyl can be started slow, increased if needed.  Medication discussed in depth including risks and benefits of medication.  Also discussed plan to start Citrucel and slowly increase up to effect.    Patient had colonoscopy today, biopsies pending.  No source of symptoms on exam but diverticulosis noted.    Patient scheduled to follow-up in clinic in about a month.  We will talk more in depth about progressive medications then.    Blanquita Grey PA-C  Division of Gastroenterology, Hepatology & Nutrition  [facility]

## 2020-10-19 LAB — COPATH REPORT: NORMAL

## 2020-10-22 ENCOUNTER — TELEPHONE (OUTPATIENT)
Dept: GASTROENTEROLOGY | Facility: CLINIC | Age: 70
End: 2020-10-22

## 2020-10-22 NOTE — TELEPHONE ENCOUNTER
Received request from Blanquita Grey PA-C to check into potential cost for a visit with this writer/RD for patient. Called pt today to discuss potential visit costs.     Due to patient's primary insurance (medicare), explained that visit with RD for IBS typically not covered by medicare. Explained potential cost for visit if patient did want a visit and insurance denied covering. Due to cost, pt declined to schedule visit. Patient is considering following a low FODMAP diet for IBS and is considering meeting with dietitian at her local [facility] as cost is much less for visit. Told patient this writer will mail out some diet education handouts on IBS and low FODMAP diet. Additionally discussed some online resources to review information such as [facility] who did the initial research behind this elimination diet.    Gave pt scheduling number/info in case, she decided she would like a visit with RD in future. Also encouraged pt to reach out to RD with any questions.    Nataliya Chávez, MS, RD, LD

## 2020-11-05 ENCOUNTER — HOSPITAL ENCOUNTER (OUTPATIENT)
Dept: ULTRASOUND IMAGING | Facility: CLINIC | Age: 70
Discharge: HOME | End: 2020-11-05
Attending: INTERNAL MEDICINE | Admitting: INTERNAL MEDICINE
Payer: COMMERCIAL

## 2020-11-05 DIAGNOSIS — R74.8 ELEVATED LIVER ENZYMES: ICD-10-CM

## 2020-11-05 PROCEDURE — 76700 US EXAM ABDOM COMPLETE: CPT

## 2020-11-10 ENCOUNTER — VIRTUAL VISIT (OUTPATIENT)
Dept: GASTROENTEROLOGY | Facility: CLINIC | Age: 70
End: 2020-11-10
Payer: COMMERCIAL

## 2020-11-10 VITALS — BODY MASS INDEX: 24.32 KG/M2 | HEIGHT: 65 IN | WEIGHT: 146 LBS

## 2020-11-10 DIAGNOSIS — R14.0 ABDOMINAL BLOATING: ICD-10-CM

## 2020-11-10 DIAGNOSIS — K58.2 IRRITABLE BOWEL SYNDROME WITH BOTH CONSTIPATION AND DIARRHEA: Primary | ICD-10-CM

## 2020-11-10 DIAGNOSIS — K21.9 GASTROESOPHAGEAL REFLUX DISEASE, UNSPECIFIED WHETHER ESOPHAGITIS PRESENT: ICD-10-CM

## 2020-11-10 PROCEDURE — 99214 OFFICE O/P EST MOD 30 MIN: CPT | Mod: 95 | Performed by: PHYSICIAN ASSISTANT

## 2020-11-10 ASSESSMENT — MIFFLIN-ST. JEOR: SCORE: 1192.09

## 2020-11-10 ASSESSMENT — PAIN SCALES - GENERAL: PAINLEVEL: NO PAIN (0)

## 2020-11-10 NOTE — PROGRESS NOTES
"Nita Trevizo is a 70 year old female who is being evaluated via a billable video visit.      The patient has been notified of following:     \"This video visit will be conducted via a call between you and your physician/provider. We have found that certain health care needs can be provided without the need for an in-person physical exam.  This service lets us provide the care you need with a video conversation.  If a prescription is necessary we can send it directly to your pharmacy.  If lab work is needed we can place an order for that and you can then stop by our lab to have the test done at a later time.    Video visits are billed at different rates depending on your insurance coverage.  Please reach out to your insurance provider with any questions.    If during the course of the call the physician/provider feels a video visit is not appropriate, you will not be charged for this service.\"    Patient has given verbal consent for Video visit? Yes  How would you like to obtain your AVS? MyChart  If you are dropped from the video visit, the video invite should be resent to: Text to cell phone: [phone]  Will anyone else be joining your video visit? No      Video-Visit Details    Type of service:  Video Visit    Video Start Time: 12:50 PM  Video End Time: 1:18 PM    Originating Location (pt. Location): Home    Distant Location (provider location):  [facility] [place]     Platform used for Video Visit: Glints    Blanquita Grey PA-C    Nita Trevizo is a 70 year old female who is being evaluated via a billable video visit.        GI CLINIC VISIT    CC/REFERRING MD:  Andrae Burns  REASON FOR CONSULTATION: IBS    ASSESSMENT/PLAN:  1.  Variable stool consistency/irregular stool pattern, abdominal pain  Patient reports variable stool consistency/pattern and abdominal pain with worsened episode of January this year, and symptoms again worsening in the past " couple of months.  Does have longstanding history of irritable bowel syndrome.  Current worsening in symptoms likely due to exacerbation and irritable bowel syndrome, functional diarrhea, food triggers, or small intestinal bacterial overgrowth.  He has had negative infectious stool studies in January of this year at which time she had similar symptoms, had CT scan with diverticulosis without diverticulitis and worsening symptoms with treatment of presumed diverticulitis. Screening colonoscopy with negative biopsies for microscopic colitis, no evidence of IBD.      Is currently working with dietitian closer to her home regarding the low FODMAP diet.  Discussed that goal of this diet would be to identify potential food triggers, but would not be for her to be on a restrictive diet lifelong.  Discussed potential food triggers with her including dairy and high fructose corn syrup.  Would recommend continuing to work with her dietitian regarding timing of reintroduction of certain foods.    In the meantime, would also recommend increasing fiber up to 1.5 packets of Benefiber a day.  If stool consistency continues to be variable, can increase up to 2 packets thereafter.  Future considerations include trial of rifaximin for IBS-D.    We also discussed fatty liver as was seen on recent ultrasound.  She also has an elevated ALT.  Discussed that we will continue to monitor this and that she should try to get good exercise, work on eating a healthier diet in general.  If she has further questions or has worsening LFTs, could consider referral to hepatology.    For increased heartburn, would recommend trial of famotidine.  Can start with once a day, increase up to twice a day as needed.  If no significant response can trial PPI.    --Continue Benefiber.  Increase up to 1.5 packets a day for 2 weeks.  If your stool consistency remains variable, increase up to 2 packets a day with a big glass of water  --You can continue to use  Bentyl as needed for abdominal cramping  --Continue to work with dietitian regarding the low FODMAP diet.  If you do not have symptoms from the food, would be reasonable to reintroduce this  --Avoid carbonated beverages such as sparkling water or Pepsi because this can cause increased bloating  --Work on getting good exercise  --You can try Pepcid or famotidine at night for heartburn.  If this does not work, you can increase up to twice a day    RTC 3 months     Thank you for this consultation.  It was a pleasure to participate in the care of this patient; please contact us with any further questions. I spent a total of 28 minutes face-to-face (via video) with Nita Trevizo during today's office visit.  Over 50% of which was counseling/coordinating care regarding the above delineated issues. Please see note for details.     Note completed using voice recognition software. Some word and grammatical errors may occur.      Blanquita Grey PA-C  Division of Gastroenterology, Hepatology & Nutrition  [facility]        HPI  Nita Trevizo is a 70 year old woman with a past medical history of GERD, osteopenia, irritable bowel syndrome presenting for evaluation of irregular stool pattern and abdominal pain.  She is new to the [facility] gastroenterology clinic and this is my first encounter with the patient.    Patient reports longstanding history of GI symptoms, starting in January of this year she had severe abdominal pain.  He had a work-up notable for negative C. difficile, enteric panel, ova and parasite, and fecal leukocytes.  Had CT abdomen and pelvis 1/9/2020 at which time she was noted to have distal colonic diverticulosis without diverticulitis.  Was again seen the next day for her symptoms and was treated with a course of ciprofloxacin and Flagyl for presumed diverticulitis.  Patient reports he had a reaction to her antibiotics.  Symptoms then seem to improve with time, however  "returned recently.  Denies any changes in medication, diet, travel, sick contacts before her symptoms started.    Now the patient reports that he is having left lower quadrant abdominal pain, can sometimes be an aching type pain.  Cramping abdominal pain can proceed having a bowel movement.    The patient describes that her stool pattern is variable.  She will have on average diarrhea 2 times a week which she describes as a cluster of bowel movements within an hour where she will have cramping associated with loose/watery stools.  Had one episode of cramping that woke her up in the middle the night though did not have a bowel movement at this time.  About 2 times a week she will have \"pebble \"bowel movements associated with some cramping or dull ache.  She will not feel empty when this happens.  Will bloat in the evening.  Occasionally can skip 1 day without a bowel movement.  Will take MiraLAX as needed for constipation which can be up to a couple of times a week, otherwise will try to drink coffee to help have a bowel movement.  Denies any fecal incontinence    She has previously been on probiotics, digestive enzymes and collagen which did not make a difference in her symptoms.  Fiber was tried in the past with Metamucil which worsened abdominal cramping    GERD- has worsened, has NG pills she takes with esophageal spasm. More often in the evening. Has been on omeprazole back in January (2 weeks)- does not want to take because of osteopenia. Helped for a while. constantly belching and burping regardless of food triggers     Diet recall:  Breakfast: will have bagel with peanut butter and banana or toast, bowl of cereal with peaches (buys no sugar added). Gluten- free pancake, eggs occasionally.   Lunch: usually a sandwich   Dinner: fish, chicken, or hamburger   Drinks: coffee in the morning, will drink la croix (2 cans), not a big water drinker. Drinks a diet pepsi at night     Daughter and grandson with celiac " "disease, no colorectal cancer, Crohn's, UC      EGD for dysphagia in 2011- normal small intestinal biopsies. Has been tested for celiac disease multiple times. Has also looked into low FODMAP diet.      Interval History:   Has taken Bentyl once for abdominal cramping. Did not have significant improvement in symptoms.     Otherwise has minor abdominal cramping with bowel movements and with coffee.     Occasionally will skip a day without a bowel movement, most of the time it is mushy. Started Benefiber- has started with one a day. Has had some firm bowel movements, otherwise mushy.     Saw dietitian with [facility] and has talked about the low FODMAP. IS trying to avoid high fat because of fatty liver. Also thinks that she is lactose intolerance. Was told to eat this with a meal (has not had tons of gas pains). Is also trying to watch portion sizes.    Hs been taking tums- 2 a night. This has been worsened in the past couple of weeks. Describes sensation of burning in abdomen, acid in mouth if she lays on her side.  Describes \"hunger pains\" at night. Has not had esophageal spasm in years.     Hickory root causes diarrhea.     Abdominal bloating.       PROBLEM LIST  Patient Active Problem List    Diagnosis Date Noted     Irritable bowel syndrome with both constipation and diarrhea 05/28/2020     Priority: Medium     Dry eyes, bilateral 10/12/2016     Priority: Medium     Osteopenia 08/01/2016     Priority: Medium     CARDIOVASCULAR SCREENING; LDL GOAL LESS THAN 130 08/01/2016     Priority: Medium     Esophageal reflux 06/27/2006     Priority: Medium     Chronic maxillary sinusitis 08/03/2004     Priority: Medium       PERTINENT PAST MEDICAL HISTORY:  Past Medical History:   Diagnosis Date     Arthritis      Disorder of bone and cartilage, unspecified      Esophageal reflux      Osteopenia 6/14    -1.5       PREVIOUS SURGERIES:  S/p cholecystectomy at age 26 (in the 70s)   tonsilltectomy   Past Surgical History:   Procedure " "Laterality Date     ABDOMEN SURGERY       C APPENDECTOMY       C LIGATE FALLOPIAN TUBE       C TOTAL ABDOM HYSTERECTOMY      for fibroids with BSO     COLONOSCOPY       COLONOSCOPY N/A 10/16/2020    Procedure: COLONOSCOPY with random colon biopsies;  Surgeon: Louie Patiño MD;  Location:  GI     GENITOURINARY SURGERY       HC REMOVAL GALLBLADDER       HC REMOVE TONSILS/ADENOIDS,<13 Y/O       ORTHOPEDIC SURGERY      wrist 2017 [facility]      VASCULAR SURGERY       Z NONSPECIFIC PROCEDURE      vein stripping x 3       PREVIOUS ENDOSCOPY:  Colonoscopy 10/16/2020  Impression:               - Diverticulosis in the sigmoid colon and in the                             descending colon. Biopsied.                             - The examination was otherwise normal on direct                             and retroflexion views.     Colon biopsy, random     FINAL DIAGNOSIS:   Colon, random biopsies:   - Negative for microscopic colitis, inflammatory bowel disease, ischemia,   infectious processes and tumors.     ALLERGIES:     Allergies   Allergen Reactions     Ciprofloxacin      Rash, eyelids red puffy & itchy     Codeine      \"chest pain\"       PERTINENT MEDICATIONS:    Current Outpatient Medications:      RESTASIS 0.05 % ophthalmic emulsion, INSTILL ONE DROP IN EACH EYE TWO TIMES A DAY, Disp: , Rfl: 3     Cetirizine HCl (ZYRTEC PO), , Disp: , Rfl:      COLLAGEN PO, , Disp: , Rfl:      dicyclomine (BENTYL) 10 MG capsule, Take 1 capsule (10 mg) by mouth 4 times daily as needed (up to four times a day as needed for abdominal pain and cramping) (Patient not taking: Reported on 11/10/2020), Disp: 60 capsule, Rfl: 4     IBUPROFEN 200 MG OR TABS, 2 TABLET EVERY 4 TO 6 HOURS AS NEEDED for headaches , Disp: , Rfl:      KRILL OIL PO, 250 mg capsules, Disp: , Rfl:      nitroGLYcerin (NITROSTAT) 0.4 MG sublingual tablet, Place 1 tablet (0.4 mg) under the tongue every 5 minutes as needed for chest pain If you are still having " symptoms after 3 doses (15 minutes) call 911. (Patient not taking: Reported on 11/10/2020), Disp: 25 tablet, Rfl: 0     polyethylene glycol (MIRALAX/GLYCOLAX) powder, Take 1 capful by mouth as needed for constipation, Disp: , Rfl:      PROBIOTIC PRODUCT PO, Formagen digestive, Disp: , Rfl:   Has been taking increased ibuprofen- maybe once a day (she had increased pain)    SOCIAL HISTORY:  No alcohol use, drug use   Social History     Socioeconomic History     Marital status:      Spouse name: Not on file     Number of children: Not on file     Years of education: Not on file     Highest education level: Not on file   Occupational History     Not on file   Social Needs     Financial resource strain: Not on file     Food insecurity     Worry: Not on file     Inability: Not on file     Transportation needs     Medical: Not on file     Non-medical: Not on file   Tobacco Use     Smoking status: Never Smoker     Smokeless tobacco: Never Used   Substance and Sexual Activity     Alcohol use: No     Alcohol/week: 0.0 standard drinks     Drug use: No     Sexual activity: Never   Lifestyle     Physical activity     Days per week: Not on file     Minutes per session: Not on file     Stress: Not on file   Relationships     Social connections     Talks on phone: Not on file     Gets together: Not on file     Attends Confucianist service: Not on file     Active member of club or organization: Not on file     Attends meetings of clubs or organizations: Not on file     Relationship status: Not on file     Intimate partner violence     Fear of current or ex partner: Not on file     Emotionally abused: Not on file     Physically abused: Not on file     Forced sexual activity: Not on file   Other Topics Concern     Parent/sibling w/ CABG, MI or angioplasty before 65F 55M? No   Social History Narrative     Not on file       FAMILY HISTORY:  Family History   Problem Relation Age of Onset     Diabetes Mother         born 1928      Hypertension Mother      Obesity Mother      Cancer Paternal Grandfather         skin     Colon Cancer Maternal Grandfather         had in his late 80s     Substance Abuse Brother      Osteoporosis Paternal Grandmother        Past/family/social history reviewed and no changes    PHYSICAL EXAMINATION:  General appearance: Healthy appearing adult, in no acute distress  Eyes: Sclera anicteric  Ears, nose, mouth and throat: No obvious external lesions of ears and nose, Hearing intact  Neck: Symmetric, No obvious external lesions  Respiratory: Normal respiration, no use of accessory muscles   Skin: No rashes or jaundice   Psychiatric: Oriented to person, place and time, Appropriate mood and affect.     PERTINENT STUDIES:  Office Visit on 09/09/2020   Component Date Value Ref Range Status     Hemoglobin 09/09/2020 14.4  11.7 - 15.7 g/dL Final     Sodium 09/09/2020 141  133 - 144 mmol/L Final     Potassium 09/09/2020 4.2  3.4 - 5.3 mmol/L Final     Chloride 09/09/2020 107  94 - 109 mmol/L Final     Carbon Dioxide 09/09/2020 26  20 - 32 mmol/L Final     Anion Gap 09/09/2020 8  3 - 14 mmol/L Final     Glucose 09/09/2020 84  70 - 99 mg/dL Final     Urea Nitrogen 09/09/2020 13  7 - 30 mg/dL Final     Creatinine 09/09/2020 0.73  0.52 - 1.04 mg/dL Final     GFR Estimate 09/09/2020 84  >60 mL/min/[1.73_m2] Final     GFR Estimate If Black 09/09/2020 >90  >60 mL/min/[1.73_m2] Final     Calcium 09/09/2020 9.0  8.5 - 10.1 mg/dL Final     Bilirubin Total 09/09/2020 0.3  0.2 - 1.3 mg/dL Final     Albumin 09/09/2020 3.5  3.4 - 5.0 g/dL Final     Protein Total 09/09/2020 7.4  6.8 - 8.8 g/dL Final     Alkaline Phosphatase 09/09/2020 79  40 - 150 U/L Final     ALT 09/09/2020 62* 0 - 50 U/L Final     AST 09/09/2020 22  0 - 45 U/L Final     Cholesterol 09/09/2020 196  <200 mg/dL Final     Triglycerides 09/09/2020 100  <150 mg/dL Final     HDL Cholesterol 09/09/2020 71  >49 mg/dL Final     LDL Cholesterol Calculated 09/09/2020 105* <100 mg/dL  Final     Non HDL Cholesterol 09/09/2020 125  <130 mg/dL Final     TSH 09/09/2020 0.79  0.40 - 4.00 mU/L Final

## 2020-11-10 NOTE — LETTER
"  11/10/2020       RE: Nita Trevizo  [address]  [facility] [address]      Dear Colleague,    Thank you for referring your patient, Nita Trevizo, to the [facility] [place]. Please see a copy of my visit note below.    Nita Trevizo is a 70 year old female who is being evaluated via a billable video visit.      The patient has been notified of following:     \"This video visit will be conducted via a call between you and your physician/provider. We have found that certain health care needs can be provided without the need for an in-person physical exam.  This service lets us provide the care you need with a video conversation.  If a prescription is necessary we can send it directly to your pharmacy.  If lab work is needed we can place an order for that and you can then stop by our lab to have the test done at a later time.    Video visits are billed at different rates depending on your insurance coverage.  Please reach out to your insurance provider with any questions.    If during the course of the call the physician/provider feels a video visit is not appropriate, you will not be charged for this service.\"    Patient has given verbal consent for Video visit? Yes  How would you like to obtain your AVS? MyChart  If you are dropped from the video visit, the video invite should be resent to: Text to cell phone: [phone]  Will anyone else be joining your video visit? No    Video-Visit Details    Type of service:  Video Visit    Video Start Time: 12:50 PM  Video End Time: 1:18 PM    Originating Location (pt. Location): Home    Distant Location (provider location):  [facility] [place]     Platform used for Video Visit: Doximity    Blanquita Grey PA-C    Nita Trevizo is a 70 year old female who is being evaluated via a billable video visit.        GI CLINIC VISIT    CC/REFERRING MD:  Andrae Burns  REASON " FOR CONSULTATION: IBS    ASSESSMENT/PLAN:  1.  Variable stool consistency/irregular stool pattern, abdominal pain  Patient reports variable stool consistency/pattern and abdominal pain with worsened episode of January this year, and symptoms again worsening in the past couple of months.  Does have longstanding history of irritable bowel syndrome.  Current worsening in symptoms likely due to exacerbation and irritable bowel syndrome, functional diarrhea, food triggers, or small intestinal bacterial overgrowth.  He has had negative infectious stool studies in January of this year at which time she had similar symptoms, had CT scan with diverticulosis without diverticulitis and worsening symptoms with treatment of presumed diverticulitis. Screening colonoscopy with negative biopsies for microscopic colitis, no evidence of IBD.      Is currently working with dietitian closer to her home regarding the low FODMAP diet.  Discussed that goal of this diet would be to identify potential food triggers, but would not be for her to be on a restrictive diet lifelong.  Discussed potential food triggers with her including dairy and high fructose corn syrup.  Would recommend continuing to work with her dietitian regarding timing of reintroduction of certain foods.    In the meantime, would also recommend increasing fiber up to 1.5 packets of Benefiber a day.  If stool consistency continues to be variable, can increase up to 2 packets thereafter.  Future considerations include trial of rifaximin for IBS-D.    We also discussed fatty liver as was seen on recent ultrasound.  She also has an elevated ALT.  Discussed that we will continue to monitor this and that she should try to get good exercise, work on eating a healthier diet in general.  If she has further questions or has worsening LFTs, could consider referral to hepatology.    For increased heartburn, would recommend trial of famotidine.  Can start with once a day, increase up  to twice a day as needed.  If no significant response can trial PPI.    --Continue Benefiber.  Increase up to 1.5 packets a day for 2 weeks.  If your stool consistency remains variable, increase up to 2 packets a day with a big glass of water  --You can continue to use Bentyl as needed for abdominal cramping  --Continue to work with dietitian regarding the low FODMAP diet.  If you do not have symptoms from the food, would be reasonable to reintroduce this  --Avoid carbonated beverages such as sparkling water or Pepsi because this can cause increased bloating  --Work on getting good exercise  --You can try Pepcid or famotidine at night for heartburn.  If this does not work, you can increase up to twice a day    RTC 3 months     Thank you for this consultation.  It was a pleasure to participate in the care of this patient; please contact us with any further questions. I spent a total of 28 minutes face-to-face (via video) with Nita Trevizo during today's office visit.  Over 50% of which was counseling/coordinating care regarding the above delineated issues. Please see note for details.     Note completed using voice recognition software. Some word and grammatical errors may occur.    Blanquita Grey PA-C  Division of Gastroenterology, Hepatology & Nutrition  [facility]      HPI  Nita Trevizo is a 70 year old woman with a past medical history of GERD, osteopenia, irritable bowel syndrome presenting for evaluation of irregular stool pattern and abdominal pain.  She is new to the [facility] gastroenterology clinic and this is my first encounter with the patient.    Patient reports longstanding history of GI symptoms, starting in January of this year she had severe abdominal pain.  He had a work-up notable for negative C. difficile, enteric panel, ova and parasite, and fecal leukocytes.  Had CT abdomen and pelvis 1/9/2020 at which time she was noted to have distal colonic diverticulosis  "without diverticulitis.  Was again seen the next day for her symptoms and was treated with a course of ciprofloxacin and Flagyl for presumed diverticulitis.  Patient reports he had a reaction to her antibiotics.  Symptoms then seem to improve with time, however returned recently.  Denies any changes in medication, diet, travel, sick contacts before her symptoms started.    Now the patient reports that he is having left lower quadrant abdominal pain, can sometimes be an aching type pain.  Cramping abdominal pain can proceed having a bowel movement.    The patient describes that her stool pattern is variable.  She will have on average diarrhea 2 times a week which she describes as a cluster of bowel movements within an hour where she will have cramping associated with loose/watery stools.  Had one episode of cramping that woke her up in the middle the night though did not have a bowel movement at this time.  About 2 times a week she will have \"pebble \"bowel movements associated with some cramping or dull ache.  She will not feel empty when this happens.  Will bloat in the evening.  Occasionally can skip 1 day without a bowel movement.  Will take MiraLAX as needed for constipation which can be up to a couple of times a week, otherwise will try to drink coffee to help have a bowel movement.  Denies any fecal incontinence    She has previously been on probiotics, digestive enzymes and collagen which did not make a difference in her symptoms.  Fiber was tried in the past with Metamucil which worsened abdominal cramping    GERD- has worsened, has NG pills she takes with esophageal spasm. More often in the evening. Has been on omeprazole back in January (2 weeks)- does not want to take because of osteopenia. Helped for a while. constantly belching and burping regardless of food triggers     Diet recall:  Breakfast: will have bagel with peanut butter and banana or toast, bowl of cereal with peaches (buys no sugar added). " "Gluten- free pancake, eggs occasionally.   Lunch: usually a sandwich   Dinner: fish, chicken, or hamburger   Drinks: coffee in the morning, will drink la croix (2 cans), not a big water drinker. Drinks a diet pepsi at night     Daughter and grandson with celiac disease, no colorectal cancer, Crohn's, UC      EGD for dysphagia in 2011- normal small intestinal biopsies. Has been tested for celiac disease multiple times. Has also looked into low FODMAP diet.      Interval History:   Has taken Bentyl once for abdominal cramping. Did not have significant improvement in symptoms.     Otherwise has minor abdominal cramping with bowel movements and with coffee.     Occasionally will skip a day without a bowel movement, most of the time it is mushy. Started Benefiber- has started with one a day. Has had some firm bowel movements, otherwise mushy.     Saw dietitian with [facility] and has talked about the low FODMAP. IS trying to avoid high fat because of fatty liver. Also thinks that she is lactose intolerance. Was told to eat this with a meal (has not had tons of gas pains). Is also trying to watch portion sizes.    Hs been taking tums- 2 a night. This has been worsened in the past couple of weeks. Describes sensation of burning in abdomen, acid in mouth if she lays on her side.  Describes \"hunger pains\" at night. Has not had esophageal spasm in years.     Hickory root causes diarrhea.     Abdominal bloating.       PROBLEM LIST  Patient Active Problem List    Diagnosis Date Noted     Irritable bowel syndrome with both constipation and diarrhea 05/28/2020     Priority: Medium     Dry eyes, bilateral 10/12/2016     Priority: Medium     Osteopenia 08/01/2016     Priority: Medium     CARDIOVASCULAR SCREENING; LDL GOAL LESS THAN 130 08/01/2016     Priority: Medium     Esophageal reflux 06/27/2006     Priority: Medium     Chronic maxillary sinusitis 08/03/2004     Priority: Medium       PERTINENT PAST MEDICAL HISTORY:  Past Medical " "History:   Diagnosis Date     Arthritis      Disorder of bone and cartilage, unspecified      Esophageal reflux      Osteopenia 6/14    -1.5       PREVIOUS SURGERIES:  S/p cholecystectomy at age 26 (in the 70s)   tonsilltectomy   Past Surgical History:   Procedure Laterality Date     ABDOMEN SURGERY       C APPENDECTOMY       C LIGATE FALLOPIAN TUBE       C TOTAL ABDOM HYSTERECTOMY      for fibroids with BSO     COLONOSCOPY       COLONOSCOPY N/A 10/16/2020    Procedure: COLONOSCOPY with random colon biopsies;  Surgeon: Louie Patiño MD;  Location:  GI     GENITOURINARY SURGERY       HC REMOVAL GALLBLADDER       HC REMOVE TONSILS/ADENOIDS,<11 Y/O       ORTHOPEDIC SURGERY      wrist 2017 [facility]      VASCULAR SURGERY       [facility] NONSPECIFIC PROCEDURE      vein stripping x 3       PREVIOUS ENDOSCOPY:  Colonoscopy 10/16/2020  Impression:               - Diverticulosis in the sigmoid colon and in the                             descending colon. Biopsied.                             - The examination was otherwise normal on direct                             and retroflexion views.     Colon biopsy, random     FINAL DIAGNOSIS:   Colon, random biopsies:   - Negative for microscopic colitis, inflammatory bowel disease, ischemia,   infectious processes and tumors.     ALLERGIES:     Allergies   Allergen Reactions     Ciprofloxacin      Rash, eyelids red puffy & itchy     Codeine      \"chest pain\"       PERTINENT MEDICATIONS:    Current Outpatient Medications:      RESTASIS 0.05 % ophthalmic emulsion, INSTILL ONE DROP IN EACH EYE TWO TIMES A DAY, Disp: , Rfl: 3     Cetirizine HCl (ZYRTEC PO), , Disp: , Rfl:      COLLAGEN PO, , Disp: , Rfl:      dicyclomine (BENTYL) 10 MG capsule, Take 1 capsule (10 mg) by mouth 4 times daily as needed (up to four times a day as needed for abdominal pain and cramping) (Patient not taking: Reported on 11/10/2020), Disp: 60 capsule, Rfl: 4     IBUPROFEN 200 MG OR TABS, 2 TABLET EVERY 4 " TO 6 HOURS AS NEEDED for headaches , Disp: , Rfl:      KRILL OIL PO, 250 mg capsules, Disp: , Rfl:      nitroGLYcerin (NITROSTAT) 0.4 MG sublingual tablet, Place 1 tablet (0.4 mg) under the tongue every 5 minutes as needed for chest pain If you are still having symptoms after 3 doses (15 minutes) call 911. (Patient not taking: Reported on 11/10/2020), Disp: 25 tablet, Rfl: 0     polyethylene glycol (MIRALAX/GLYCOLAX) powder, Take 1 capful by mouth as needed for constipation, Disp: , Rfl:      PROBIOTIC PRODUCT PO, Formagen digestive, Disp: , Rfl:   Has been taking increased ibuprofen- maybe once a day (she had increased pain)    SOCIAL HISTORY:  No alcohol use, drug use   Social History     Socioeconomic History     Marital status:      Spouse name: Not on file     Number of children: Not on file     Years of education: Not on file     Highest education level: Not on file   Occupational History     Not on file   Social Needs     Financial resource strain: Not on file     Food insecurity     Worry: Not on file     Inability: Not on file     Transportation needs     Medical: Not on file     Non-medical: Not on file   Tobacco Use     Smoking status: Never Smoker     Smokeless tobacco: Never Used   Substance and Sexual Activity     Alcohol use: No     Alcohol/week: 0.0 standard drinks     Drug use: No     Sexual activity: Never   Lifestyle     Physical activity     Days per week: Not on file     Minutes per session: Not on file     Stress: Not on file   Relationships     Social connections     Talks on phone: Not on file     Gets together: Not on file     Attends Jewish service: Not on file     Active member of club or organization: Not on file     Attends meetings of clubs or organizations: Not on file     Relationship status: Not on file     Intimate partner violence     Fear of current or ex partner: Not on file     Emotionally abused: Not on file     Physically abused: Not on file     Forced sexual  activity: Not on file   Other Topics Concern     Parent/sibling w/ CABG, MI or angioplasty before 65F 55M? No   Social History Narrative     Not on file       FAMILY HISTORY:  Family History   Problem Relation Age of Onset     Diabetes Mother         born 1928     Hypertension Mother      Obesity Mother      Cancer Paternal Grandfather         skin     Colon Cancer Maternal Grandfather         had in his late 80s     Substance Abuse Brother      Osteoporosis Paternal Grandmother        Past/family/social history reviewed and no changes    PHYSICAL EXAMINATION:  General appearance: Healthy appearing adult, in no acute distress  Eyes: Sclera anicteric  Ears, nose, mouth and throat: No obvious external lesions of ears and nose, Hearing intact  Neck: Symmetric, No obvious external lesions  Respiratory: Normal respiration, no use of accessory muscles   Skin: No rashes or jaundice   Psychiatric: Oriented to person, place and time, Appropriate mood and affect.     PERTINENT STUDIES:  Office Visit on 09/09/2020   Component Date Value Ref Range Status     Hemoglobin 09/09/2020 14.4  11.7 - 15.7 g/dL Final     Sodium 09/09/2020 141  133 - 144 mmol/L Final     Potassium 09/09/2020 4.2  3.4 - 5.3 mmol/L Final     Chloride 09/09/2020 107  94 - 109 mmol/L Final     Carbon Dioxide 09/09/2020 26  20 - 32 mmol/L Final     Anion Gap 09/09/2020 8  3 - 14 mmol/L Final     Glucose 09/09/2020 84  70 - 99 mg/dL Final     Urea Nitrogen 09/09/2020 13  7 - 30 mg/dL Final     Creatinine 09/09/2020 0.73  0.52 - 1.04 mg/dL Final     GFR Estimate 09/09/2020 84  >60 mL/min/[1.73_m2] Final     GFR Estimate If Black 09/09/2020 >90  >60 mL/min/[1.73_m2] Final     Calcium 09/09/2020 9.0  8.5 - 10.1 mg/dL Final     Bilirubin Total 09/09/2020 0.3  0.2 - 1.3 mg/dL Final     Albumin 09/09/2020 3.5  3.4 - 5.0 g/dL Final     Protein Total 09/09/2020 7.4  6.8 - 8.8 g/dL Final     Alkaline Phosphatase 09/09/2020 79  40 - 150 U/L Final     ALT 09/09/2020 62*  0 - 50 U/L Final     AST 09/09/2020 22  0 - 45 U/L Final     Cholesterol 09/09/2020 196  <200 mg/dL Final     Triglycerides 09/09/2020 100  <150 mg/dL Final     HDL Cholesterol 09/09/2020 71  >49 mg/dL Final     LDL Cholesterol Calculated 09/09/2020 105* <100 mg/dL Final     Non HDL Cholesterol 09/09/2020 125  <130 mg/dL Final     TSH 09/09/2020 0.79  0.40 - 4.00 mU/L Final       Blanquita Grey PA-C

## 2020-11-10 NOTE — NURSING NOTE
"Chief Complaint   Patient presents with     RECHECK     IBS follow up.       Vitals:    11/10/20 1207   Weight: 66.2 kg (146 lb)   Height: 1.657 m (5' 5.25\")       Body mass index is 24.11 kg/m .    Percy Leon LPN      "

## 2020-11-10 NOTE — PATIENT INSTRUCTIONS
It was a pleasure taking care of you today.  I've included a brief summary of our discussion and care plan from today's visit below.  Please review this information with your primary care provider.  ______________________________________________________________________    My recommendations are summarized as follows:    --Continue Benefiber.  Increase up to 1.5 packets a day for 2 weeks.  If your stool consistency remains variable, increase up to 2 packets a day with a big glass of water  --You can continue to use Bentyl as needed for abdominal cramping  --Continue to work with dietitian regarding the low FODMAP diet.  If you do not have symptoms from the food, would be reasonable to reintroduce this  --Avoid carbonated beverages such as sparkling water or Pepsi because this can cause increased bloating  --Work on getting good exercise  --You can try Pepcid or famotidine at night for heartburn.  If this does not work, you can increase up to twice a day    Return to GI Clinic in 3 months to review your progress.    ______________________________________________________________________    How do I schedule labs, imaging studies, or procedures that were ordered in clinic today?   Labs: To schedule lab appointment at the [facility], use my chart or call [phone]. If you have a [facility] lab closer to home where you are regularly seen you can give them a call.     Procedures: If a colonoscopy, upper endoscopy, breath test, esophageal manometry, or pH impedence was ordered today, our endoscopy team will call you to schedule this. If you have not heard from our endoscopy team within a week, please call [phone] to schedule.     Imaging Studies: If you were scheduled for a CT scan, X-ray, MRI, ultrasound, HIDA scan or other imaging study, please call [phone] to have this scheduled.     Referral: If a referral to another specialty was ordered, expect a phone call or follow instructions above. If  you have not heard from anyone regarding your referral in a week, please call our clinic to check the status.     Who do I call with any questions after my visit?  Please be in touch if there are any further questions that arise following today's visit.  There are multiple ways to contact your gastroenterology care team.        During business hours, you may reach a Gastroenterology nurse at [phone]      To schedule or reschedule an appointment, please call [phone].       You can always send a secure message through [employer].  [employer] messages are answered by your nurse or doctor typically within 24 hours.  Please allow extra time on weekends and holidays.        For urgent/emergent questions after business hours, you may reach the on-call GI Fellow by contacting the [facility]  at [phone].     How will I get the results of any tests ordered?    You will receive all of your results.  If you have signed up for [employer], any tests ordered at your visit will be available to you after your physician reviews them.  Typically this takes 1-2 weeks.  If there are urgent results that require a change in your care plan, your physician or nurse will call you to discuss the next steps.      What is [employer]?  [employer] is a secure way for you to access all of your healthcare records from the [facility].  It is a web based computer program, so you can sign on to it from any location.  It also allows you to send secure messages to your care team.  I recommend signing up for [employer] access if you have not already done so and are comfortable with using a computer.      How to I schedule a follow-up visit?  If you did not schedule a follow-up visit today, please call [phone] to schedule a follow-up office visit.      Sincerely,    Blanquita Grey PA-C  Division of Gastroenterology, Hepatology & Nutrition  [facility]

## 2021-01-28 ENCOUNTER — TRANSFERRED RECORDS (OUTPATIENT)
Dept: HEALTH INFORMATION MANAGEMENT | Facility: CLINIC | Age: 71
End: 2021-01-28

## 2021-01-28 DIAGNOSIS — R74.8 ELEVATED LIVER ENZYMES: ICD-10-CM

## 2021-01-28 PROCEDURE — 36415 COLL VENOUS BLD VENIPUNCTURE: CPT | Performed by: INTERNAL MEDICINE

## 2021-01-28 PROCEDURE — 80076 HEPATIC FUNCTION PANEL: CPT | Performed by: INTERNAL MEDICINE

## 2021-01-29 LAB
ALBUMIN SERPL-MCNC: 3.6 G/DL (ref 3.4–5)
ALP SERPL-CCNC: 80 U/L (ref 40–150)
ALT SERPL W P-5'-P-CCNC: 55 U/L (ref 0–50)
AST SERPL W P-5'-P-CCNC: 29 U/L (ref 0–45)
BILIRUB DIRECT SERPL-MCNC: <0.1 MG/DL (ref 0–0.2)
BILIRUB SERPL-MCNC: 0.3 MG/DL (ref 0.2–1.3)
PROT SERPL-MCNC: 7.3 G/DL (ref 6.8–8.8)

## 2021-02-10 ENCOUNTER — OFFICE VISIT (OUTPATIENT)
Dept: INTERNAL MEDICINE | Facility: CLINIC | Age: 71
End: 2021-02-10
Payer: COMMERCIAL

## 2021-02-10 VITALS
TEMPERATURE: 98.3 F | HEIGHT: 65 IN | HEART RATE: 82 BPM | SYSTOLIC BLOOD PRESSURE: 130 MMHG | DIASTOLIC BLOOD PRESSURE: 78 MMHG | WEIGHT: 147 LBS | BODY MASS INDEX: 24.49 KG/M2 | OXYGEN SATURATION: 98 % | RESPIRATION RATE: 13 BRPM

## 2021-02-10 DIAGNOSIS — M65.30 TRIGGER FINGER, ACQUIRED: ICD-10-CM

## 2021-02-10 DIAGNOSIS — Z01.818 PREOP GENERAL PHYSICAL EXAM: Primary | ICD-10-CM

## 2021-02-10 DIAGNOSIS — K58.2 IRRITABLE BOWEL SYNDROME WITH BOTH CONSTIPATION AND DIARRHEA: ICD-10-CM

## 2021-02-10 DIAGNOSIS — J32.0 CHRONIC MAXILLARY SINUSITIS: ICD-10-CM

## 2021-02-10 LAB — HGB BLD-MCNC: 13.6 G/DL (ref 11.7–15.7)

## 2021-02-10 PROCEDURE — 99214 OFFICE O/P EST MOD 30 MIN: CPT | Performed by: INTERNAL MEDICINE

## 2021-02-10 PROCEDURE — 36415 COLL VENOUS BLD VENIPUNCTURE: CPT | Performed by: INTERNAL MEDICINE

## 2021-02-10 PROCEDURE — 85018 HEMOGLOBIN: CPT | Performed by: INTERNAL MEDICINE

## 2021-02-10 PROCEDURE — 93000 ELECTROCARDIOGRAM COMPLETE: CPT | Performed by: INTERNAL MEDICINE

## 2021-02-10 PROCEDURE — 84132 ASSAY OF SERUM POTASSIUM: CPT | Performed by: INTERNAL MEDICINE

## 2021-02-10 ASSESSMENT — MIFFLIN-ST. JEOR: SCORE: 1196.63

## 2021-02-10 NOTE — NURSING NOTE
"/78   Pulse 100   Temp 98.3  F (36.8  C) (Oral)   Resp 13   Ht 1.657 m (5' 5.25\")   Wt 66.7 kg (147 lb)   SpO2 98%   BMI 24.27 kg/m    Patient in for Pre-op.  Sinai Patrick CMA    "

## 2021-02-10 NOTE — PROGRESS NOTES
[place] [address]  [facility] [address]  Phone: [phone]  Primary Provider: Keri Noyola  Pre-op Performing Provider: KERI NOYOLA      PREOPERATIVE EVALUATION:  Today's date: 2/10/2021    Nita Trevizo is a 70 year old female who presents for a preoperative evaluation.    Surgical Information:  Surgery/Procedure: Rt trigger finger repair ( index and middle finger)   Surgery Location: [facility]  Surgeon: Dr Stevens  Surgery Date: 2/18/21  Time of Surgery: 830  Where patient plans to recover: At home with family  Fax number for surgical facility: [phone]    Type of Anesthesia Anticipated: General    Assessment & Plan     The proposed surgical procedure is considered LOW risk.    (Z01.818) Preop general physical exam  (primary encounter diagnosis)  Comment: Trigger finger release(right index and middle fingers)  Plan: EKG 12-lead complete w/read - Clinics,         Hemoglobin, Potassium            (M65.30) Trigger finger, acquired  Plan: Trigger finger release(right index and middle fingers)    (K58.2) Irritable bowel syndrome with both constipation and diarrhea  Plan: follows up with GI     (J32.0) Chronic maxillary sinusitis          Risks and Recommendations:  The patient has the following additional risks and recommendations for perioperative complications:   - No identified additional risk factors other than previously addressed    Medication Instructions:   - aspirin: Discontinue aspirin 7-  days prior to procedure to reduce bleeding risk.   -Avoid NSAID's 3 days before surgery     RECOMMENDATION:  APPROVAL GIVEN to proceed with proposed procedure, without further diagnostic evaluation.    Review of the result(s) of each unique test - Hgb  Independent interpretation of a test  EKG   [id number]}    Subjective     HPI related to upcoming procedure: Has trigger finger on the right index and middle fingers, has had cortisone injections  without much symptom relief.    Preop Questions 2/3/2021   1. Have you ever had a heart attack or stroke? No   2. Have you ever had surgery on your heart or blood vessels, such as a stent placement, a coronary artery bypass, or surgery on an artery in your head, neck, heart, or legs? No   3. Do you have chest pain with activity? No   4. Do you have a history of  heart failure? No   5. Do you currently have a cold, bronchitis or symptoms of other infection? No   6. Do you have a cough, shortness of breath, or wheezing? No   7. Do you or anyone in your family have previous history of blood clots? No   8. Do you or does anyone in your family have a serious bleeding problem such as prolonged bleeding following surgeries or cuts? No   9. Have you ever had problems with anemia or been told to take iron pills? No   10. Have you had any abnormal blood loss such as black, tarry or bloody stools, or abnormal vaginal bleeding? No   11. Have you ever had a blood transfusion? No   12. Are you willing to have a blood transfusion if it is medically needed before, during, or after your surgery? Yes   13. Have you or any of your relatives ever had problems with anesthesia? No   14. Do you have sleep apnea, excessive snoring or daytime drowsiness? No   15. Do you have any artifical heart valves or other implanted medical devices like a pacemaker, defibrillator, or continuous glucose monitor? No   16. Do you have artificial joints? No   17. Are you allergic to latex? No   18. Is there any chance that you may be pregnant? -       Health Care Directive:  Patient does not have a Health Care Directive or Living Will: Patient will bring in     [id number]}    Status of Chronic Conditions:     Irritable bowel syndrome-stable      Review of Systems  CONSTITUTIONAL: NEGATIVE for fever, chills, change in weight  INTEGUMENTARY/SKIN: NEGATIVE for worrisome rashes, moles or lesions  EYES: NEGATIVE for vision changes or irritation  ENT/MOUTH: NEGATIVE  for ear, mouth and throat problems  RESP: NEGATIVE for significant cough or SOB  CV: NEGATIVE for chest pain, palpitations or peripheral edema  GI: NEGATIVE for nausea, abdominal pain, heartburn, or change in bowel habits  : NEGATIVE for frequency, dysuria, or hematuria  MUSCULOSKELETAL: NEGATIVE for significant arthralgias or myalgia  NEURO: NEGATIVE for weakness, dizziness or paresthesias  ENDOCRINE: NEGATIVE for temperature intolerance, skin/hair changes  HEME: NEGATIVE for bleeding problems  PSYCHIATRIC: NEGATIVE for changes in mood or affect    Patient Active Problem List    Diagnosis Date Noted     Irritable bowel syndrome with both constipation and diarrhea 05/28/2020     Priority: Medium     Dry eyes, bilateral 10/12/2016     Priority: Medium     Osteopenia 08/01/2016     Priority: Medium     CARDIOVASCULAR SCREENING; LDL GOAL LESS THAN 130 08/01/2016     Priority: Medium     Esophageal reflux 06/27/2006     Priority: Medium     Chronic maxillary sinusitis 08/03/2004     Priority: Medium      Past Medical History:   Diagnosis Date     Arthritis      Disorder of bone and cartilage, unspecified      Esophageal reflux      Osteopenia 6/14    -1.5     Past Surgical History:   Procedure Laterality Date     ABDOMEN SURGERY       C APPENDECTOMY       C LIGATE FALLOPIAN TUBE       C TOTAL ABDOM HYSTERECTOMY      for fibroids with BSO     COLONOSCOPY       COLONOSCOPY N/A 10/16/2020    Procedure: COLONOSCOPY with random colon biopsies;  Surgeon: Louie Patiño MD;  Location:  GI     GENITOURINARY SURGERY       HC REMOVAL GALLBLADDER       HC REMOVE TONSILS/ADENOIDS,<11 Y/O       ORTHOPEDIC SURGERY      wrist 2017 [facility]      VASCULAR SURGERY       Z NONSPECIFIC PROCEDURE      vein stripping x 3     Current Outpatient Medications   Medication Sig Dispense Refill     dicyclomine (BENTYL) 10 MG capsule Take 1 capsule (10 mg) by mouth 4 times daily as needed (up to four times a day as needed for abdominal  "pain and cramping) 60 capsule 4     IBUPROFEN 200 MG OR TABS 2 TABLET EVERY 4 TO 6 HOURS AS NEEDED for headaches        nitroGLYcerin (NITROSTAT) 0.4 MG sublingual tablet Place 1 tablet (0.4 mg) under the tongue every 5 minutes as needed for chest pain If you are still having symptoms after 3 doses (15 minutes) call 911. 25 tablet 0     polyethylene glycol (MIRALAX/GLYCOLAX) powder Take 1 capful by mouth as needed for constipation       RESTASIS 0.05 % ophthalmic emulsion INSTILL ONE DROP IN EACH EYE TWO TIMES A DAY  3       Allergies   Allergen Reactions     Ciprofloxacin      Rash, eyelids red puffy & itchy     Codeine      \"chest pain\"        Social History     Tobacco Use     Smoking status: Never Smoker     Smokeless tobacco: Never Used   Substance Use Topics     Alcohol use: No     Alcohol/week: 0.0 standard drinks     Family History   Problem Relation Age of Onset     Diabetes Mother         born 1928     Hypertension Mother      Obesity Mother      Cancer Paternal Grandfather         skin     Colon Cancer Maternal Grandfather         had in his late 80s     Substance Abuse Brother      Osteoporosis Paternal Grandmother      History   Drug Use No         Objective     /78   Pulse 100   Temp 98.3  F (36.8  C) (Oral)   Resp 13   Ht 1.657 m (5' 5.25\")   Wt 66.7 kg (147 lb)   SpO2 98%   BMI 24.27 kg/m      Physical Exam    GENERAL APPEARANCE: healthy, alert and no distress     EYES: EOMI, PERRL     NECK: no adenopathy, no asymmetry, masses, or scars and thyroid normal to palpation     RESP: lungs clear to auscultation - no rales, rhonchi or wheezes     CV: regular rates and rhythm, normal S1 S2, no S3 or S4 and no murmur, click or rub     ABDOMEN:  soft, nontender, no HSM or masses and bowel sounds normal     MS: extremities normal- no gross deformities noted, no evidence of inflammation in joints, FROM in all extremities.     NEURO: Normal strength and tone, sensory exam grossly normal, mentation " intact and speech normal     PSYCH: mentation appears normal. and affect normal/bright     LYMPHATICS: No cervical adenopathy    Recent Labs   Lab Test 09/09/20  1126 01/13/20  1112 01/09/20  1233   HGB 14.4 14.8 15.3   PLT  --  214 237    140 140   POTASSIUM 4.2 4.2 3.8   CR 0.73 0.72 0.64        Diagnostics:  Recent Results (from the past 24 hour(s))   Hemoglobin    Collection Time: 02/10/21 11:14 AM   Result Value Ref Range    Hemoglobin 13.6 11.7 - 15.7 g/dL      EKG: Sinus rhythm , no acute ST/T changes c/w ischemia, no LVH by voltage criteria,      Revised Cardiac Risk Index (RCRI):  The patient has the following serious cardiovascular risks for perioperative complications:   - No serious cardiac risks = 0 points      Signed Electronically by: Andrae Burns MD  Copy of this evaluation report is provided to requesting physician.    [facility] Preop Guidelines    Revised Cardiac Risk Index

## 2021-02-11 LAB — POTASSIUM SERPL-SCNC: 4.2 MMOL/L (ref 3.4–5.3)

## 2021-05-25 ENCOUNTER — TRANSFERRED RECORDS (OUTPATIENT)
Dept: HEALTH INFORMATION MANAGEMENT | Facility: CLINIC | Age: 71
End: 2021-05-25

## 2021-06-17 ENCOUNTER — TRANSFERRED RECORDS (OUTPATIENT)
Dept: HEALTH INFORMATION MANAGEMENT | Facility: CLINIC | Age: 71
End: 2021-06-17

## 2021-07-06 ENCOUNTER — OFFICE VISIT (OUTPATIENT)
Dept: INTERNAL MEDICINE | Facility: CLINIC | Age: 71
End: 2021-07-06
Payer: COMMERCIAL

## 2021-07-06 VITALS
TEMPERATURE: 98.3 F | DIASTOLIC BLOOD PRESSURE: 82 MMHG | HEART RATE: 92 BPM | BODY MASS INDEX: 27.6 KG/M2 | HEIGHT: 62 IN | WEIGHT: 150 LBS | SYSTOLIC BLOOD PRESSURE: 132 MMHG | OXYGEN SATURATION: 100 % | RESPIRATION RATE: 15 BRPM

## 2021-07-06 DIAGNOSIS — Z01.818 PREOP GENERAL PHYSICAL EXAM: Primary | ICD-10-CM

## 2021-07-06 DIAGNOSIS — M19.039 WRIST ARTHRITIS: ICD-10-CM

## 2021-07-06 DIAGNOSIS — Z13.29 SCREENING FOR THYROID DISORDER: ICD-10-CM

## 2021-07-06 LAB — HGB BLD-MCNC: 14.2 G/DL (ref 11.7–15.7)

## 2021-07-06 PROCEDURE — 85018 HEMOGLOBIN: CPT | Performed by: INTERNAL MEDICINE

## 2021-07-06 PROCEDURE — 99214 OFFICE O/P EST MOD 30 MIN: CPT | Performed by: INTERNAL MEDICINE

## 2021-07-06 PROCEDURE — 36415 COLL VENOUS BLD VENIPUNCTURE: CPT | Performed by: INTERNAL MEDICINE

## 2021-07-06 PROCEDURE — 84443 ASSAY THYROID STIM HORMONE: CPT | Performed by: INTERNAL MEDICINE

## 2021-07-06 PROCEDURE — 93000 ELECTROCARDIOGRAM COMPLETE: CPT | Performed by: INTERNAL MEDICINE

## 2021-07-06 PROCEDURE — 80048 BASIC METABOLIC PNL TOTAL CA: CPT | Performed by: INTERNAL MEDICINE

## 2021-07-06 RX ORDER — FLUTICASONE PROPIONATE 50 MCG
1 SPRAY, SUSPENSION (ML) NASAL DAILY
COMMUNITY

## 2021-07-06 RX ORDER — LORATADINE 10 MG/1
10 TABLET ORAL DAILY
COMMUNITY
End: 2023-01-31

## 2021-07-06 ASSESSMENT — MIFFLIN-ST. JEOR: SCORE: 1162.62

## 2021-07-06 NOTE — NURSING NOTE
"/82   Pulse 110   Temp 98.3  F (36.8  C) (Oral)   Resp 15   Ht 1.581 m (5' 2.25\")   Wt 68 kg (150 lb)   SpO2 100%   BMI 27.22 kg/m    Patient in for Pre-Op.  Sinai Patrick CMA    "

## 2021-07-06 NOTE — PROGRESS NOTES
[place] [address]  [facility] [address]  Phone: [phone]  Primary Provider: Keri Noyola  Pre-op Performing Provider: KERI NOYOLA      PREOPERATIVE EVALUATION:  Today's date: 7/6/2021    Nita Trevizo is a 70 year old female who presents for a preoperative evaluation.    Surgical Information:  Surgery/Procedure: Lt DRUJ debridement , interposition graft fascia rafael   Surgery Location: [facility]  Surgeon: Dr Stevens  Surgery Date: 7/13/21  Time of Surgery: 1115  Where patient plans to recover: At home with family  Fax number for surgical facility: [phone]    Type of Anesthesia Anticipated: General    Assessment & Plan     The proposed surgical procedure is considered LOW risk.    (Z01.818) Preop general physical exam  (primary encounter diagnosis)  Comment: Lt DRUJ debridement , interposition graft fascia rafael   Plan: EKG 12-lead complete w/read - Clinics, Basic         metabolic panel, Hemoglobin           (M19.039) Wrist arthritis  Plan: Lt DRUJ debridement , interposition graft fascia rafael       (Z13.29) Screening for thyroid disorder  Plan: TSH with free T4 reflex             Risks and Recommendations:  The patient has the following additional risks and recommendations for perioperative complications:   - No identified additional risk factors other than previously addressed    Medication Instructions:   - aspirin: Discontinue aspirin 7 days prior to procedure to reduce bleeding risk.   --Hold NSAID's 3 days before surgery       RECOMMENDATION:  APPROVAL GIVEN to proceed with proposed procedure, without further diagnostic evaluation.    Review of the result(s) of each unique test - BMP, Hgb   Independent interpretation of a test EKG      Subjective     HPI related to upcoming procedure: Lt wrist arthritis ,getting  Lt DRUJ debridement , interposition graft fascia rafael     Preop Questions 6/29/2021   1. Have you ever had a heart  attack or stroke? No   2. Have you ever had surgery on your heart or blood vessels, such as a stent placement, a coronary artery bypass, or surgery on an artery in your head, neck, heart, or legs? No   3. Do you have chest pain with activity? No   4. Do you have a history of  heart failure? No   5. Do you currently have a cold, bronchitis or symptoms of other infection? No   6. Do you have a cough, shortness of breath, or wheezing? No   7. Do you or anyone in your family have previous history of blood clots? No   8. Do you or does anyone in your family have a serious bleeding problem such as prolonged bleeding following surgeries or cuts? No   9. Have you ever had problems with anemia or been told to take iron pills? No   10. Have you had any abnormal blood loss such as black, tarry or bloody stools, or abnormal vaginal bleeding? No   11. Have you ever had a blood transfusion? No   12. Are you willing to have a blood transfusion if it is medically needed before, during, or after your surgery? Yes   13. Have you or any of your relatives ever had problems with anesthesia? YES - nausea    14. Do you have sleep apnea, excessive snoring or daytime drowsiness? No   15. Do you have any artifical heart valves or other implanted medical devices like a pacemaker, defibrillator, or continuous glucose monitor? No   16. Do you have artificial joints? No   17. Are you allergic to latex? No   18. Is there any chance that you may be pregnant? -       Health Care Directive:  Patient does not have a Health Care Directive or Living Will: Discussed advance care planning with patient; however, patient declined at this time.     Status of Chronic Conditions:  See problem list for active medical problems.  Problems all longstanding and stable, except as noted/documented.  See ROS for pertinent symptoms related to these conditions.      Review of Systems  CONSTITUTIONAL: NEGATIVE for fever, chills, change in weight  INTEGUMENTARY/SKIN:  NEGATIVE for worrisome rashes, moles or lesions  EYES: NEGATIVE for vision changes or irritation  ENT/MOUTH: NEGATIVE for ear, mouth and throat problems  RESP: NEGATIVE for significant cough or SOB  CV: NEGATIVE for chest pain, palpitations or peripheral edema  GI: NEGATIVE for nausea, abdominal pain, heartburn, or change in bowel habits  : NEGATIVE for frequency, dysuria, or hematuria  MUSCULOSKELETAL: NEGATIVE for significant arthralgias or myalgia  NEURO: NEGATIVE for weakness, dizziness or paresthesias  ENDOCRINE: NEGATIVE for temperature intolerance, skin/hair changes  HEME: NEGATIVE for bleeding problems  PSYCHIATRIC: NEGATIVE for changes in mood or affect    Patient Active Problem List    Diagnosis Date Noted     Irritable bowel syndrome with both constipation and diarrhea 05/28/2020     Priority: Medium     Dry eyes, bilateral 10/12/2016     Priority: Medium     Osteopenia 08/01/2016     Priority: Medium     CARDIOVASCULAR SCREENING; LDL GOAL LESS THAN 130 08/01/2016     Priority: Medium     Esophageal reflux 06/27/2006     Priority: Medium     Chronic maxillary sinusitis 08/03/2004     Priority: Medium      Past Medical History:   Diagnosis Date     Arthritis      Disorder of bone and cartilage, unspecified      Esophageal reflux      Osteopenia 6/14    -1.5     Past Surgical History:   Procedure Laterality Date     ABDOMEN SURGERY       C APPENDECTOMY       C LIGATE FALLOPIAN TUBE       C TOTAL ABDOM HYSTERECTOMY      for fibroids with BSO     COLONOSCOPY       COLONOSCOPY N/A 10/16/2020    Procedure: COLONOSCOPY with random colon biopsies;  Surgeon: Louie Patiño MD;  Location:  GI     GENITOURINARY SURGERY       HC REMOVAL GALLBLADDER       HC REMOVE TONSILS/ADENOIDS,<11 Y/O       ORTHOPEDIC SURGERY      wrist 2017 [facility]      VASCULAR SURGERY       Z NONSPECIFIC PROCEDURE      vein stripping x 3     Current Outpatient Medications   Medication Sig Dispense Refill     dicyclomine (BENTYL) 10  "MG capsule Take 1 capsule (10 mg) by mouth 4 times daily as needed (up to four times a day as needed for abdominal pain and cramping) 60 capsule 4     IBUPROFEN 200 MG OR TABS 2 TABLET EVERY 4 TO 6 HOURS AS NEEDED for headaches        nitroGLYcerin (NITROSTAT) 0.4 MG sublingual tablet Place 1 tablet (0.4 mg) under the tongue every 5 minutes as needed for chest pain If you are still having symptoms after 3 doses (15 minutes) call 911. 25 tablet 0     polyethylene glycol (MIRALAX/GLYCOLAX) powder Take 1 capful by mouth as needed for constipation       RESTASIS 0.05 % ophthalmic emulsion INSTILL ONE DROP IN EACH EYE TWO TIMES A DAY  3       Allergies   Allergen Reactions     Ciprofloxacin      Rash, eyelids red puffy & itchy     Codeine      Esophogeal spasms        Social History     Tobacco Use     Smoking status: Never Smoker     Smokeless tobacco: Never Used   Substance Use Topics     Alcohol use: No     Alcohol/week: 0.0 standard drinks     Family History   Problem Relation Age of Onset     Diabetes Mother         born 1928     Hypertension Mother      Obesity Mother      Cancer Paternal Grandfather         skin     Colon Cancer Maternal Grandfather         had in his late 80s     Substance Abuse Brother      Osteoporosis Paternal Grandmother      History   Drug Use No         Objective     /82   Pulse 92   Temp 98.3  F (36.8  C) (Oral)   Resp 15   Ht 1.581 m (5' 2.25\")   Wt 68 kg (150 lb)   SpO2 100%   BMI 27.22 kg/m      Physical Exam    GENERAL APPEARANCE: healthy, alert and no distress     EYES: EOMI, PERRL     NECK: no adenopathy, no asymmetry, masses, or scars and thyroid normal to palpation     RESP: lungs clear to auscultation - no rales, rhonchi or wheezes     CV: regular rates and rhythm, normal S1 S2, no S3 or S4 and no murmur, click or rub     ABDOMEN:  soft, nontender, no HSM or masses and bowel sounds normal     MS: extremities normal- no gross deformities noted, no evidence of " inflammation in joints, FROM in all extremities.     NEURO: Normal strength and tone, sensory exam grossly normal, mentation intact and speech normal     PSYCH: mentation appears normal. and affect normal/bright     LYMPHATICS: No cervical adenopathy    Recent Labs   Lab Test 02/10/21  1114 09/09/20  1126 01/13/20  1112 01/09/20  1233   HGB 13.6 14.4 14.8 15.3   PLT  --   --  214 237   NA  --  141 140 140   POTASSIUM 4.2 4.2 4.2 3.8   CR  --  0.73 0.72 0.64        Diagnostics:  Recent Results (from the past 24 hour(s))   Hemoglobin    Collection Time: 07/06/21 11:43 AM   Result Value Ref Range    Hemoglobin 14.2 11.7 - 15.7 g/dL      EKG: Sinus Rhythm,   normal intervals, no acute ST/T changes c/w ischemia, no LVH by voltage criteria, unchanged from previous tracings    Revised Cardiac Risk Index (RCRI):  The patient has the following serious cardiovascular risks for perioperative complications:   - No serious cardiac risks = 0 points     Signed Electronically by: Andrae Burns MD  Copy of this evaluation report is provided to requesting physician.

## 2021-07-07 LAB
ANION GAP SERPL CALCULATED.3IONS-SCNC: 9 MMOL/L (ref 3–14)
BUN SERPL-MCNC: 18 MG/DL (ref 7–30)
CALCIUM SERPL-MCNC: 9.1 MG/DL (ref 8.5–10.1)
CHLORIDE SERPL-SCNC: 106 MMOL/L (ref 94–109)
CO2 SERPL-SCNC: 27 MMOL/L (ref 20–32)
CREAT SERPL-MCNC: 0.8 MG/DL (ref 0.52–1.04)
GFR SERPL CREATININE-BSD FRML MDRD: 75 ML/MIN/{1.73_M2}
GLUCOSE SERPL-MCNC: 88 MG/DL (ref 70–99)
POTASSIUM SERPL-SCNC: 4.3 MMOL/L (ref 3.4–5.3)
SODIUM SERPL-SCNC: 142 MMOL/L (ref 133–144)
TSH SERPL DL<=0.005 MIU/L-ACNC: 1.02 MU/L (ref 0.4–4)

## 2021-07-23 ENCOUNTER — MYC MEDICAL ADVICE (OUTPATIENT)
Dept: INTERNAL MEDICINE | Facility: CLINIC | Age: 71
End: 2021-07-23

## 2021-09-13 ENCOUNTER — OFFICE VISIT (OUTPATIENT)
Dept: INTERNAL MEDICINE | Facility: CLINIC | Age: 71
End: 2021-09-13
Payer: COMMERCIAL

## 2021-09-13 ENCOUNTER — HOSPITAL ENCOUNTER (OUTPATIENT)
Dept: MAMMOGRAPHY | Facility: CLINIC | Age: 71
Discharge: HOME | End: 2021-09-13
Attending: INTERNAL MEDICINE | Admitting: INTERNAL MEDICINE
Payer: COMMERCIAL

## 2021-09-13 VITALS
HEIGHT: 62 IN | SYSTOLIC BLOOD PRESSURE: 136 MMHG | RESPIRATION RATE: 14 BRPM | HEART RATE: 95 BPM | OXYGEN SATURATION: 99 % | DIASTOLIC BLOOD PRESSURE: 82 MMHG | BODY MASS INDEX: 28.67 KG/M2 | TEMPERATURE: 98.6 F | WEIGHT: 155.8 LBS

## 2021-09-13 DIAGNOSIS — K22.4 ESOPHAGEAL SPASM: Primary | ICD-10-CM

## 2021-09-13 DIAGNOSIS — Z12.31 SCREENING MAMMOGRAM FOR HIGH-RISK PATIENT: ICD-10-CM

## 2021-09-13 DIAGNOSIS — Z00.00 ROUTINE HISTORY AND PHYSICAL EXAMINATION OF ADULT: ICD-10-CM

## 2021-09-13 DIAGNOSIS — R32 URINARY INCONTINENCE, UNSPECIFIED TYPE: ICD-10-CM

## 2021-09-13 PROCEDURE — 80053 COMPREHEN METABOLIC PANEL: CPT | Performed by: INTERNAL MEDICINE

## 2021-09-13 PROCEDURE — 77063 BREAST TOMOSYNTHESIS BI: CPT

## 2021-09-13 PROCEDURE — 99397 PER PM REEVAL EST PAT 65+ YR: CPT | Performed by: INTERNAL MEDICINE

## 2021-09-13 PROCEDURE — 36415 COLL VENOUS BLD VENIPUNCTURE: CPT | Performed by: INTERNAL MEDICINE

## 2021-09-13 PROCEDURE — 80061 LIPID PANEL: CPT | Performed by: INTERNAL MEDICINE

## 2021-09-13 RX ORDER — NITROGLYCERIN 0.4 MG/1
0.4 TABLET SUBLINGUAL EVERY 5 MIN PRN
Qty: 25 TABLET | Refills: 0 | Status: SHIPPED | OUTPATIENT
Start: 2021-09-13

## 2021-09-13 ASSESSMENT — ENCOUNTER SYMPTOMS
MYALGIAS: 0
HEADACHES: 0
SORE THROAT: 0
FEVER: 0
DYSURIA: 0
SHORTNESS OF BREATH: 0
WEAKNESS: 0
DIZZINESS: 0
EYE PAIN: 0
BREAST MASS: 0
CHILLS: 0
HEMATOCHEZIA: 0
DIARRHEA: 1
CONSTIPATION: 1
JOINT SWELLING: 0
HEARTBURN: 1
NERVOUS/ANXIOUS: 0
FREQUENCY: 0
ARTHRALGIAS: 0
HEMATURIA: 0
PALPITATIONS: 0
PARESTHESIAS: 0
COUGH: 1
NAUSEA: 0
ABDOMINAL PAIN: 0

## 2021-09-13 ASSESSMENT — ACTIVITIES OF DAILY LIVING (ADL): CURRENT_FUNCTION: NO ASSISTANCE NEEDED

## 2021-09-13 ASSESSMENT — MIFFLIN-ST. JEOR: SCORE: 1183.92

## 2021-09-13 NOTE — PROGRESS NOTES
"SUBJECTIVE:   Nita Trevizo is a 71 year old female who presents for Preventive Visit.      Patient has been advised of split billing requirements and indicates understanding: Yes   Are you in the first 12 months of your Medicare coverage?  No    Healthy Habits:     In general, how would you rate your overall health?  Good    Frequency of exercise:  2-3 days/week    Duration of exercise:  15-30 minutes    Do you usually eat at least 4 servings of fruit and vegetables a day, include whole grains    & fiber and avoid regularly eating high fat or \"junk\" foods?  No    Taking medications regularly:  Yes    Medication side effects:  None    Ability to successfully perform activities of daily living:  No assistance needed    Home Safety:  Lack of grab bars in the bathroom    Hearing Impairment:  No hearing concerns    In the past 6 months, have you been bothered by leaking of urine? Yes    In general, how would you rate your overall mental or emotional health?  Excellent      PHQ-2 Total Score: 0    Additional concerns today:  Yes    Do you feel safe in your environment? Yes    Have you ever done Advance Care Planning? (For example, a Health Directive, POLST, or a discussion with a medical provider or your loved ones about your wishes): No, advance care planning information given to patient to review.  Advanced care planning was discussed at today's visit.       Fall risk  Fallen 2 or more times in the past year?: No  Any fall with injury in the past year?: No    Cognitive Screening   1) Repeat 3 items (Leader, Season, Table)    2) Clock draw: NORMAL  3) 3 item recall: Recalls 3 objects  Results: 3 items recalled: COGNITIVE IMPAIRMENT LESS LIKELY    Mini-CogTM Copyright DENISE Flores. Licensed by the author for use in [facility]; reprinted with permission ([email or web address]). All rights reserved.      Do you have sleep apnea, excessive snoring or daytime drowsiness?: no    Reviewed and updated as needed this visit by " clinical staff  Tobacco     Med Hx  Surg Hx  Fam Hx  Soc Hx        Reviewed and updated as needed this visit by Provider                  Past Medical History:   Diagnosis Date     Arthritis      Disorder of bone and cartilage, unspecified      Esophageal reflux      Osteopenia 6/14    -1.5       Patient Active Problem List   Diagnosis     Chronic maxillary sinusitis     Esophageal reflux     Osteopenia     CARDIOVASCULAR SCREENING; LDL GOAL LESS THAN 130     Dry eyes, bilateral     Irritable bowel syndrome with both constipation and diarrhea       Past Surgical History:   Procedure Laterality Date     ABDOMEN SURGERY       C APPENDECTOMY       C LIGATE FALLOPIAN TUBE       C TOTAL ABDOM HYSTERECTOMY      for fibroids with BSO     COLONOSCOPY       COLONOSCOPY N/A 10/16/2020    Procedure: COLONOSCOPY with random colon biopsies;  Surgeon: Louie Patiño MD;  Location:  GI     GENITOURINARY SURGERY       HC REMOVAL GALLBLADDER       HC REMOVE TONSILS/ADENOIDS,<11 Y/O       ORTHOPEDIC SURGERY      wrist 2017 [facility]      VASCULAR SURGERY       Z NONSPECIFIC PROCEDURE      vein stripping x 3       Current Outpatient Medications   Medication Sig Dispense Refill     fluticasone (FLONASE) 50 MCG/ACT nasal spray Spray 1 spray into both nostrils daily       IBUPROFEN 200 MG OR TABS 2 TABLET EVERY 4 TO 6 HOURS AS NEEDED for headaches        loratadine (CLARITIN) 10 MG tablet Take 10 mg by mouth daily       nitroGLYcerin (NITROSTAT) 0.4 MG sublingual tablet Place 1 tablet (0.4 mg) under the tongue every 5 minutes as needed for chest pain If you are still having symptoms after 3 doses (15 minutes) call 911. 25 tablet 0     polyethylene glycol (MIRALAX/GLYCOLAX) powder Take 1 capful by mouth as needed for constipation       RESTASIS 0.05 % ophthalmic emulsion INSTILL ONE DROP IN EACH EYE TWO TIMES A DAY  3       Family History   Problem Relation Age of Onset     Diabetes Mother         born 1928     Hypertension  Mother      Obesity Mother      Cancer Paternal Grandfather         skin     Colon Cancer Maternal Grandfather         had in his late 80s     Substance Abuse Brother      Osteoporosis Paternal Grandmother        Social History     Tobacco Use     Smoking status: Never Smoker     Smokeless tobacco: Never Used   Substance Use Topics     Alcohol use: No     Alcohol/week: 0.0 standard drinks     If you drink alcohol do you typically have >3 drinks per day or >7 drinks per week? No    Alcohol Use 9/13/2021   Prescreen: >3 drinks/day or >7 drinks/week? Not Applicable   Prescreen: >3 drinks/day or >7 drinks/week? -   No flowsheet data found.       Pt c/o urinary incontince on and off since several months. Pt states that she gets more dribbling and leaking urine after she has completed urination.  No burning or frequency. patient has seen urologist in the past and has had cystoscopy.    Has history of esophageal spasms and takes nitroglycerin occasionally with good symptom relief.,  Patient follows up with a GI    Current providers sharing in care for this patient include:   Patient Care Team:  Andrae Burns MD as PCP - General (Internal Medicine)  Andrae Burns MD as Assigned PCP    The following health maintenance items are reviewed in Epic and correct as of today:  Health Maintenance Due   Topic Date Due     ANNUAL REVIEW OF  ORDERS  Never done     INFLUENZA VACCINE (1) 09/01/2021     MEDICARE ANNUAL WELLNESS VISIT  09/09/2021     MAMMO SCREENING  09/09/2021      Review of Systems   Constitutional: Negative for chills and fever.   HENT: Negative for congestion, ear pain, hearing loss and sore throat.    Eyes: Negative for pain and visual disturbance.   Respiratory: Negative for shortness of breath.    Cardiovascular: Negative for chest pain, palpitations and peripheral edema.   Gastrointestinal: Positive for constipation and diarrhea. Negative for abdominal pain, hematochezia and nausea.       "  H/o IBS and follows up with GI   Breasts:  Negative for breast mass and discharge.   Genitourinary: Negative for dysuria, frequency, genital sores, hematuria, pelvic pain, urgency, vaginal bleeding and vaginal discharge.   Musculoskeletal: Negative for arthralgias, joint swelling and myalgias.   Skin: Negative for rash.   Neurological: Negative for dizziness, weakness, headaches and paresthesias.   Psychiatric/Behavioral: Negative for mood changes. The patient is not nervous/anxious.       OBJECTIVE:   /82   Pulse 95   Temp 98.6  F (37  C) (Oral)   Resp 14   Ht 1.581 m (5' 2.25\")   Wt 70.7 kg (155 lb 12.8 oz)   SpO2 99%   BMI 28.27 kg/m   Estimated body mass index is 28.27 kg/m  as calculated from the following:    Height as of this encounter: 1.581 m (5' 2.25\").    Weight as of this encounter: 70.7 kg (155 lb 12.8 oz).  Physical Exam  GENERAL APPEARANCE: healthy, alert and no distress  EYES: Eyes grossly normal to inspection, PERRL and conjunctivae and sclerae normal  NECK: no adenopathy, no asymmetry, masses, or scars and thyroid normal to palpation  RESP: lungs clear to auscultation - no rales, rhonchi or wheezes  BREAST: normal without masses, tenderness or nipple discharge and no palpable axillary masses or adenopathy  CV: regular rate and rhythm, normal S1 S2, no S3 or S4, no murmur, click or rub, no peripheral edema and peripheral pulses strong  ABDOMEN: soft, nontender, no hepatosplenomegaly, no masses and bowel sounds normal  MS: mild varicose and spider veins noted Lt LE( has had vein surgery )  ,no LE edema or calf tenderness  NEURO: Normal strength and tone, sensory exam grossly normal, mentation intact and speech normal  PSYCH: mentation appears normal and affect normal/bright    ASSESSMENT / PLAN:       (Z00.00) Routine history and physical examination of adult  Plan: Had recent hemoglobin, check comprehensive metabolic panel, Lipid panel         reflex to direct LDL Fasting        " "Up-to-date on mammogram, colonoscopy, DEXA    (R32) Urinary incontinence, unspecified type  Plan: Adult Urology Referral          (K22.4) Esophageal spasm   Plan: nitroGLYcerin (NITROSTAT) 0.4 MG sublingual         tablet refilled as directed.explained clearly about the medication,insructions and side effects.       Patient has been advised of split billing requirements and indicates understanding: Yes  COUNSELING:  Reviewed preventive health counseling, as reflected in patient instructions       Regular exercise       Healthy diet/nutrition    Estimated body mass index is 28.27 kg/m  as calculated from the following:    Height as of this encounter: 1.581 m (5' 2.25\").    Weight as of this encounter: 70.7 kg (155 lb 12.8 oz).        She reports that she has never smoked. She has never used smokeless tobacco.      Appropriate preventive services were discussed with this patient, including applicable screening as appropriate for cardiovascular disease, diabetes, osteopenia/osteoporosis, and glaucoma.  As appropriate for age/gender, discussed screening for colorectal cancer, prostate cancer, breast cancer, and cervical cancer. Checklist reviewing preventive services available has been given to the patient.    Reviewed patients plan of care and provided an AVS. The Basic Care Plan (routine screening as documented in Health Maintenance) for Nita meets the Care Plan requirement. This Care Plan has been established and reviewed with the Patient.    Counseling Resources:  ATP IV Guidelines  Pooled Cohorts Equation Calculator  Breast Cancer Risk Calculator  Breast Cancer: Medication to Reduce Risk  FRAX Risk Assessment  ICSI Preventive Guidelines  Dietary Guidelines for Americans, 2010  [employer]'s MyPlate  ASA Prophylaxis  Lung CA Screening    Anrdae Burns MD  [facility]    Identified Health Risks:  "

## 2021-09-13 NOTE — NURSING NOTE
"BP (!) 138/91   Pulse 95   Temp 98.6  F (37  C) (Oral)   Resp 14   Ht 1.581 m (5' 2.25\")   Wt 70.7 kg (155 lb 12.8 oz)   SpO2 99%   BMI 28.27 kg/m    Patient in for Medicare Visit.  Sinai Patrick CMA    "

## 2021-09-14 LAB
ALBUMIN SERPL-MCNC: 3.3 G/DL (ref 3.4–5)
ALP SERPL-CCNC: 68 U/L (ref 40–150)
ALT SERPL W P-5'-P-CCNC: 39 U/L (ref 0–50)
ANION GAP SERPL CALCULATED.3IONS-SCNC: 1 MMOL/L (ref 3–14)
AST SERPL W P-5'-P-CCNC: 19 U/L (ref 0–45)
BILIRUB SERPL-MCNC: 0.3 MG/DL (ref 0.2–1.3)
BUN SERPL-MCNC: 12 MG/DL (ref 7–30)
CALCIUM SERPL-MCNC: 8.2 MG/DL (ref 8.5–10.1)
CHLORIDE BLD-SCNC: 106 MMOL/L (ref 94–109)
CHOLEST SERPL-MCNC: 189 MG/DL
CO2 SERPL-SCNC: 31 MMOL/L (ref 20–32)
CREAT SERPL-MCNC: 0.72 MG/DL (ref 0.52–1.04)
FASTING STATUS PATIENT QL REPORTED: YES
GFR SERPL CREATININE-BSD FRML MDRD: 85 ML/MIN/1.73M2
GLUCOSE BLD-MCNC: 72 MG/DL (ref 70–99)
HDLC SERPL-MCNC: 56 MG/DL
LDLC SERPL CALC-MCNC: 112 MG/DL
NONHDLC SERPL-MCNC: 133 MG/DL
POTASSIUM BLD-SCNC: 4.5 MMOL/L (ref 3.4–5.3)
PROT SERPL-MCNC: 6.6 G/DL (ref 6.8–8.8)
SODIUM SERPL-SCNC: 138 MMOL/L (ref 133–144)
TRIGL SERPL-MCNC: 104 MG/DL

## 2021-09-18 ENCOUNTER — HEALTH MAINTENANCE LETTER (OUTPATIENT)
Age: 71
End: 2021-09-18

## 2021-10-15 DIAGNOSIS — N39.46 MIXED INCONTINENCE: Primary | ICD-10-CM

## 2021-10-20 ENCOUNTER — OFFICE VISIT (OUTPATIENT)
Dept: UROLOGY | Facility: CLINIC | Age: 71
End: 2021-10-20
Payer: COMMERCIAL

## 2021-10-20 VITALS
BODY MASS INDEX: 25.99 KG/M2 | WEIGHT: 156 LBS | HEIGHT: 65 IN | SYSTOLIC BLOOD PRESSURE: 102 MMHG | DIASTOLIC BLOOD PRESSURE: 72 MMHG

## 2021-10-20 DIAGNOSIS — N94.2 VAGINISMUS: ICD-10-CM

## 2021-10-20 DIAGNOSIS — N39.43 POST-VOID DRIBBLING: Primary | ICD-10-CM

## 2021-10-20 DIAGNOSIS — K58.2 IRRITABLE BOWEL SYNDROME WITH MIXED BOWEL HABITS: ICD-10-CM

## 2021-10-20 PROBLEM — N39.46 MIXED INCONTINENCE: Status: RESOLVED | Noted: 2021-10-20 | Resolved: 2021-10-20

## 2021-10-20 PROBLEM — N39.46 MIXED INCONTINENCE: Status: ACTIVE | Noted: 2021-10-20

## 2021-10-20 PROCEDURE — 99203 OFFICE O/P NEW LOW 30 MIN: CPT | Mod: 25 | Performed by: PHYSICIAN ASSISTANT

## 2021-10-20 PROCEDURE — 51798 US URINE CAPACITY MEASURE: CPT | Performed by: PHYSICIAN ASSISTANT

## 2021-10-20 ASSESSMENT — ENCOUNTER SYMPTOMS
CHILLS: 0
FEVER: 0
DIARRHEA: 1
ARTHRALGIAS: 1
FREQUENCY: 0
VOMITING: 0
CONSTIPATION: 1
DYSURIA: 0
SHORTNESS OF BREATH: 0
HEMATURIA: 0
NAUSEA: 0

## 2021-10-20 ASSESSMENT — PAIN SCALES - GENERAL: PAINLEVEL: NO PAIN (0)

## 2021-10-20 ASSESSMENT — MIFFLIN-ST. JEOR: SCORE: 1228.49

## 2021-10-20 NOTE — PROGRESS NOTES
"Subjective      REQUESTING PROVIDER   Andrae Burns     REASON FOR CONSULT   Post void dribbling    HISTORY OF PRESENT ILLNESS   Ms. Trevizo is a very pleasant 71-year-old female, who presents today for evaluation recommendations regarding postvoid dribbling.  Patient notes that she has been having worsening drops of urine after voiding.  This has been longstanding dating back to at least 2008 or earlier.    She wears a light pad daily.  She does try to switch positions at times.  Patient denies urgency or frequency of urination.  No urge incontinence or stress incontinence.  She has nocturia 1-2 times, but does not restrict fluids prior to bedtime.  She feels that she empties her bladder completely.  No history urinary tract infections.  No hematuria or dysuria.  She does have IBS mixed in bowel movements range between constipation, diarrhea, and \"normal.  She typically notes that these are more regular when she has 2 cups of coffee in the morning.    She has had inability to have sex since she underwent her hysterectomy.  They were unable to place a speculum and she is very tight.  She has not seen physical therapy for this.  She was tried on Detrol approximately 13 years ago for several days and had no effect on her urinary symptoms, but it did cause extreme constipation.    She does endorse some frequency of urination when she has particularly bad constipation.  She is worried that this is going to get worse.  Her mother had significant urinary incontinence and she does not want to have that occur.  She notes it is currently manageable.    She notes multiple medical issues this year including 2 surgeries, a fractured toe, and Covid to begin the year.    The following portions of the patient's history were reviewed and updated as appropriate: allergies, current medications, past family history, past medical history, past social history, past surgical history and problem list.     REVIEW OF SYSTEMS " "  Review of Systems   Constitutional: Negative for chills and fever.   Respiratory: Negative for shortness of breath.    Cardiovascular: Negative for chest pain.   Gastrointestinal: Positive for constipation and diarrhea. Negative for nausea and vomiting.   Genitourinary: Negative for dysuria, frequency, hematuria and urgency.   Musculoskeletal: Positive for arthralgias.      Per HPI.     Patient Active Problem List   Diagnosis     Chronic maxillary sinusitis     Esophageal reflux     Osteopenia     Dry eyes, bilateral     Irritable bowel syndrome with both constipation and diarrhea      Past Medical History:   Diagnosis Date     Arthritis      Disorder of bone and cartilage, unspecified      Esophageal reflux      Osteopenia 6/14    -1.5      Past Surgical History:   Procedure Laterality Date     ABDOMEN SURGERY       BLADDER SURGERY       C APPENDECTOMY       C LIGATE FALLOPIAN TUBE       C TOTAL ABDOM HYSTERECTOMY      for fibroids with BSO     COLONOSCOPY       COLONOSCOPY N/A 10/16/2020    Procedure: COLONOSCOPY with random colon biopsies;  Surgeon: Louie Patiño MD;  Location:  GI     CYSTOSCOPY       GENITOURINARY SURGERY       HC REMOVAL GALLBLADDER       HC REMOVE TONSILS/ADENOIDS,<13 Y/O       ORTHOPEDIC SURGERY      wrist 2017 [facility]      VASCULAR SURGERY       ZZC NONSPECIFIC PROCEDURE      vein stripping x 3      Social History:   .  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     Family History:   Family history of significant incontinence in her mother.     Objective      PHYSICAL EXAM   /72   Ht 1.651 m (5' 5\")   Wt 70.8 kg (156 lb)   BMI 25.96 kg/m     Physical Exam  Constitutional:       Appearance: Normal appearance.   HENT:      Head: Normocephalic.      Nose: Nose normal.   Pulmonary:      Effort: Pulmonary effort is normal.   Abdominal:      General: There is no distension.   Musculoskeletal:      Cervical back: Normal range of motion.   Skin:     Findings: No rash.   Neurological:    "   General: No focal deficit present.      Mental Status: She is alert and oriented to person, place, and time.   Psychiatric:         Mood and Affect: Mood normal.         Behavior: Behavior normal.        LABORATORY   Recent Labs   Lab Test 01/09/20  1126 02/11/19  0959 02/11/19  0959   COLOR Yellow   < > Yellow   APPEARANCE Clear   < > Clear   URINEGLC Negative   < > Negative   URINEBILI Negative   < > Negative   URINEKETONE Negative   < > Negative   SG 1.025   < > <=1.005   UBLD Trace*   < > Trace*   URINEPH 5.5   < > 5.5   PROTEIN Negative   < > Negative   UROBILINOGEN  --   --  0.2   NITRITE Negative   < > Negative   LEUKEST Trace*   < > Negative   RBCU 2   < > O - 2   WBCU 4   < > 0 - 5    < > = values in this interval not displayed.     Unable to leave a urine sample today.    TESTING    PVR: 46 mL    Assessment & Plan    1. Post-void dribbling    2. Irritable bowel syndrome with mixed bowel habits    3. Vaginismus      I had the pleasure today of meeting with Ms. Trevizo to discuss her postvoid dribbling.  We discussed that unfortunately this is very difficult to make changes to.  Her postvoid residual is 46 mL, which show she empties her bladder well.  She does not have any other symptomatology other than when her constipation is bad, her urinary frequency increases.    We discussed the relationship between bowel and bladder.  We also discussed how she may have a small amount of urine tract in the urethra or tract between the labial fold by the vagina that comes out after she stands up.    I have recommended that she take her time with urinating.  She should also try to take deep breaths and exhale to try to relax of her pelvic floor while urinating.  We also discussed position shifting and intentional double voiding to try to get all the urine out before she pulled up her pants.    She does note some tightness in the vaginal area.  We also discussed that we could consider pelvic floor physical therapy to  see if she is adequately relaxing her pelvic floor and try to improve this to see if intercourse may be a possibility in the future.  She would like to decline at this time.  If she would like to go forward with this, she can contact us in the next year and will place order for pelvic floor physical therapy.    I have noted that this likely a lot kidney worse, but if she does have changes in urinary symptomatology such as new urge incontinence or stress incontinence, I would like her to contact us in the interim.    Signed by:     Olya Benites PA-C 10/20/2021 1:28 PM

## 2021-10-20 NOTE — LETTER
"10/20/2021       RE: Nita Trevizo  [address]  [facility] [address]     Dear Colleague,    Thank you for referring your patient, Nita Trevizo, to the [facility] [place] [facility]. Please see a copy of my visit note below.    Subjective      REQUESTING PROVIDER   Andrae Burns     REASON FOR CONSULT   Post void dribbling    HISTORY OF PRESENT ILLNESS   Ms. Trevizo is a very pleasant 71-year-old female, who presents today for evaluation recommendations regarding postvoid dribbling.  Patient notes that she has been having worsening drops of urine after voiding.  This has been longstanding dating back to at least 2008 or earlier.    She wears a light pad daily.  She does try to switch positions at times.  Patient denies urgency or frequency of urination.  No urge incontinence or stress incontinence.  She has nocturia 1-2 times, but does not restrict fluids prior to bedtime.  She feels that she empties her bladder completely.  No history urinary tract infections.  No hematuria or dysuria.  She does have IBS mixed in bowel movements range between constipation, diarrhea, and \"normal.  She typically notes that these are more regular when she has 2 cups of coffee in the morning.    She has had inability to have sex since she underwent her hysterectomy.  They were unable to place a speculum and she is very tight.  She has not seen physical therapy for this.  She was tried on Detrol approximately 13 years ago for several days and had no effect on her urinary symptoms, but it did cause extreme constipation.    She does endorse some frequency of urination when she has particularly bad constipation.  She is worried that this is going to get worse.  Her mother had significant urinary incontinence and she does not want to have that occur.  She notes it is currently manageable.    She notes multiple medical issues this year " "including 2 surgeries, a fractured toe, and Covid to begin the year.    The following portions of the patient's history were reviewed and updated as appropriate: allergies, current medications, past family history, past medical history, past social history, past surgical history and problem list.     REVIEW OF SYSTEMS   Review of Systems   Constitutional: Negative for chills and fever.   Respiratory: Negative for shortness of breath.    Cardiovascular: Negative for chest pain.   Gastrointestinal: Positive for constipation and diarrhea. Negative for nausea and vomiting.   Genitourinary: Negative for dysuria, frequency, hematuria and urgency.   Musculoskeletal: Positive for arthralgias.      Per HPI.     Patient Active Problem List   Diagnosis     Chronic maxillary sinusitis     Esophageal reflux     Osteopenia     Dry eyes, bilateral     Irritable bowel syndrome with both constipation and diarrhea      Past Medical History:   Diagnosis Date     Arthritis      Disorder of bone and cartilage, unspecified      Esophageal reflux      Osteopenia 6/14    -1.5      Past Surgical History:   Procedure Laterality Date     ABDOMEN SURGERY       BLADDER SURGERY       C APPENDECTOMY       C LIGATE FALLOPIAN TUBE       C TOTAL ABDOM HYSTERECTOMY      for fibroids with BSO     COLONOSCOPY       COLONOSCOPY N/A 10/16/2020    Procedure: COLONOSCOPY with random colon biopsies;  Surgeon: Louie Patiño MD;  Location:  GI     CYSTOSCOPY       GENITOURINARY SURGERY       HC REMOVAL GALLBLADDER       HC REMOVE TONSILS/ADENOIDS,<13 Y/O       ORTHOPEDIC SURGERY      wrist 2017 [facility]      VASCULAR SURGERY       ZZC NONSPECIFIC PROCEDURE      vein stripping x 3      Social History:   .  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     Family History:   Family history of significant incontinence in her mother.     Objective      PHYSICAL EXAM   /72   Ht 1.651 m (5' 5\")   Wt 70.8 kg (156 lb)   BMI 25.96 kg/m     Physical " Exam  Constitutional:       Appearance: Normal appearance.   HENT:      Head: Normocephalic.      Nose: Nose normal.   Pulmonary:      Effort: Pulmonary effort is normal.   Abdominal:      General: There is no distension.   Musculoskeletal:      Cervical back: Normal range of motion.   Skin:     Findings: No rash.   Neurological:      General: No focal deficit present.      Mental Status: She is alert and oriented to person, place, and time.   Psychiatric:         Mood and Affect: Mood normal.         Behavior: Behavior normal.        LABORATORY   Recent Labs   Lab Test 01/09/20  1126 02/11/19  0959 02/11/19  0959   COLOR Yellow   < > Yellow   APPEARANCE Clear   < > Clear   URINEGLC Negative   < > Negative   URINEBILI Negative   < > Negative   URINEKETONE Negative   < > Negative   SG 1.025   < > <=1.005   UBLD Trace*   < > Trace*   URINEPH 5.5   < > 5.5   PROTEIN Negative   < > Negative   UROBILINOGEN  --   --  0.2   NITRITE Negative   < > Negative   LEUKEST Trace*   < > Negative   RBCU 2   < > O - 2   WBCU 4   < > 0 - 5    < > = values in this interval not displayed.     Unable to leave a urine sample today.    TESTING    PVR: 46 mL    Assessment & Plan    1. Post-void dribbling    2. Irritable bowel syndrome with mixed bowel habits    3. Vaginismus      I had the pleasure today of meeting with Ms. Trevizo to discuss her postvoid dribbling.  We discussed that unfortunately this is very difficult to make changes to.  Her postvoid residual is 46 mL, which show she empties her bladder well.  She does not have any other symptomatology other than when her constipation is bad, her urinary frequency increases.    We discussed the relationship between bowel and bladder.  We also discussed how she may have a small amount of urine tract in the urethra or tract between the labial fold by the vagina that comes out after she stands up.    I have recommended that she take her time with urinating.  She should also try to take deep  breaths and exhale to try to relax of her pelvic floor while urinating.  We also discussed position shifting and intentional double voiding to try to get all the urine out before she pulled up her pants.    She does note some tightness in the vaginal area.  We also discussed that we could consider pelvic floor physical therapy to see if she is adequately relaxing her pelvic floor and try to improve this to see if intercourse may be a possibility in the future.  She would like to decline at this time.  If she would like to go forward with this, she can contact us in the next year and will place order for pelvic floor physical therapy.    I have noted that this likely a lot kidney worse, but if she does have changes in urinary symptomatology such as new urge incontinence or stress incontinence, I would like her to contact us in the interim.    Signed by:     Olya Benites PA-C 10/20/2021 1:28 PM

## 2021-11-18 ENCOUNTER — TRANSFERRED RECORDS (OUTPATIENT)
Dept: HEALTH INFORMATION MANAGEMENT | Facility: CLINIC | Age: 71
End: 2021-11-18
Payer: COMMERCIAL

## 2021-11-25 ENCOUNTER — NURSE TRIAGE (OUTPATIENT)
Dept: NURSING | Facility: CLINIC | Age: 71
End: 2021-11-25
Payer: COMMERCIAL

## 2021-11-25 DIAGNOSIS — N30.00 ACUTE CYSTITIS WITHOUT HEMATURIA: Primary | ICD-10-CM

## 2021-11-25 RX ORDER — NITROFURANTOIN 25; 75 MG/1; MG/1
100 CAPSULE ORAL 2 TIMES DAILY
Qty: 14 CAPSULE | Refills: 0 | Status: SHIPPED | OUTPATIENT
Start: 2021-11-25 | End: 2021-12-02

## 2021-11-25 NOTE — TELEPHONE ENCOUNTER
1343pm, triager received return call from Dr. Pallager, She ordered Macrobid 100mg twice daily for 7 days for urinary symptoms. Provider says is patient has not improved by Monday 11/29/21. She will need to go into clinic for UA, or if feeling worse before Monday. Patient will need to go to Urgent Care.   Triager called out to patient with no answer. Left message for patient to call back for instructions and to verify pharmacy.    Second page went on to on call provider Dr. Pallager. Awaiting for return call.    Patient is complaining of urgency and frequency with urination. Patient says she is only able to go a small amount, and then goes back to the toilet within five minutes with burning afterwards.  Triage guidelines recommend to go to ED now. Caller refused disposiiton, and wants provider paged.  Triager paged on call provider Dr. Pallager. Patient advised to call back if no return call within 30 minutes.  COVID 19 Nurse Triage Plan/Patient Instructions    Please be aware that novel coronavirus (COVID-19) may be circulating in the community. If you develop symptoms such as fever, cough, or SOB or if you have concerns about the presence of another infection including coronavirus (COVID-19), please contact your health care provider or visit [email or web address].     Disposition/Instructions    ED Visit recommended. Follow protocol based instructions.     Bring Your Own Device:  Please also bring your smart device(s) (smart phones, tablets, laptops) and their charging cables for your personal use and to communicate with your care team during your visit.    Thank you for taking steps to prevent the spread of this virus.  o Limit your contact with others.  o Wear a simple mask to cover your cough.  o Wash your hands well and often.    Resources    [facility]: About COVID-19: [email or web address]    CDC: What to Do If You're Sick:  www.cdc.gov/coronavirus/2019-ncov/about/steps-when-sick.html    CDC: Ending Home Isolation: www.cdc.gov/coronavirus/2019-ncov/hcp/disposition-in-home-patients.html     CDC: Caring for Someone: www.cdc.gov/coronavirus/2019-ncov/if-you-are-sick/care-for-someone.html     [facility]: Interim Guidance for Hospital Discharge to Home: www.health.[facility].mn./diseases/coronavirus/hcp/hospdischarge.pdf    [facility] clinical trials (COVID-19 research studies): clinicalaffairs.[facility]-clinical-trials     Below are the COVID-19 hotlines at the [place] [facility] ([facility]). Interpreters are available.   o For health questions: Call 026-669-7542 or 1-438.842.4720 (7 a.m. to 7 p.m.)  o For questions about schools and childcare: Call 080-493-1189 or 1-931.598.3403 (7 a.m. to 7 p.m.)                     Reason for Disposition    [1] Discomfort (pain, burning or stinging) when passing urine AND [2] female    [1] Unable to urinate (or only a few drops) > 4 hours AND [2] bladder feels very full (e.g., palpable bladder or strong urge to urinate)    Additional Information    Negative: Shock suspected (e.g., cold/pale/clammy skin, too weak to stand, low BP, rapid pulse)    Negative: Sounds like a life-threatening emergency to the triager    Negative: Followed a genital area injury (penis, scrotum)    Negative: Followed a genital area injury    Negative: Vaginal discharge    Negative: Pus (white, yellow) or bloody discharge from end of penis    Negative: [1] Taking antibiotic for urinary tract infection (UTI) AND [2] female    Negative: Shock suspected (e.g., cold/pale/clammy skin, too weak to stand, low BP, rapid pulse)    Negative: Sounds like a life-threatening emergency to the triager    Negative: Followed a genital area injury    Negative: Taking antibiotic for urinary tract infection (UTI)    Negative: Pregnant    Negative: Postpartum (from 0 to 6 weeks after delivery)    Protocols used: URINARY SYMPTOMS-A-AH,  URINATION PAIN - FEMALE-A-AH

## 2022-02-10 ENCOUNTER — TRANSFERRED RECORDS (OUTPATIENT)
Dept: HEALTH INFORMATION MANAGEMENT | Facility: CLINIC | Age: 72
End: 2022-02-10
Payer: COMMERCIAL

## 2022-02-24 ENCOUNTER — OFFICE VISIT (OUTPATIENT)
Dept: INTERNAL MEDICINE | Facility: CLINIC | Age: 72
End: 2022-02-24
Payer: COMMERCIAL

## 2022-02-24 VITALS
HEART RATE: 92 BPM | TEMPERATURE: 98.2 F | WEIGHT: 160 LBS | DIASTOLIC BLOOD PRESSURE: 80 MMHG | OXYGEN SATURATION: 99 % | RESPIRATION RATE: 18 BRPM | HEIGHT: 65 IN | SYSTOLIC BLOOD PRESSURE: 134 MMHG | BODY MASS INDEX: 26.66 KG/M2

## 2022-02-24 DIAGNOSIS — Z01.818 PREOP GENERAL PHYSICAL EXAM: Primary | ICD-10-CM

## 2022-02-24 LAB
ERYTHROCYTE [DISTWIDTH] IN BLOOD BY AUTOMATED COUNT: 14.7 % (ref 10–15)
HCT VFR BLD AUTO: 44.6 % (ref 35–47)
HGB BLD-MCNC: 14.1 G/DL (ref 11.7–15.7)
MCH RBC QN AUTO: 27.6 PG (ref 26.5–33)
MCHC RBC AUTO-ENTMCNC: 31.6 G/DL (ref 31.5–36.5)
MCV RBC AUTO: 88 FL (ref 78–100)
PLATELET # BLD AUTO: 223 10E3/UL (ref 150–450)
RBC # BLD AUTO: 5.1 10E6/UL (ref 3.8–5.2)
WBC # BLD AUTO: 6.1 10E3/UL (ref 4–11)

## 2022-02-24 PROCEDURE — 36415 COLL VENOUS BLD VENIPUNCTURE: CPT | Performed by: NURSE PRACTITIONER

## 2022-02-24 PROCEDURE — 85027 COMPLETE CBC AUTOMATED: CPT | Performed by: NURSE PRACTITIONER

## 2022-02-24 PROCEDURE — 93000 ELECTROCARDIOGRAM COMPLETE: CPT | Performed by: NURSE PRACTITIONER

## 2022-02-24 PROCEDURE — 99214 OFFICE O/P EST MOD 30 MIN: CPT | Performed by: NURSE PRACTITIONER

## 2022-02-24 PROCEDURE — 80048 BASIC METABOLIC PNL TOTAL CA: CPT | Performed by: NURSE PRACTITIONER

## 2022-02-24 RX ORDER — PHENOL 1.4 %
10 AEROSOL, SPRAY (ML) MUCOUS MEMBRANE
COMMUNITY

## 2022-02-24 NOTE — PATIENT INSTRUCTIONS
Preparing for Your Surgery  Getting started  A nurse will call you to review your health history and instructions. They will give you an arrival time based on your scheduled surgery time. Please be ready to share:    Your doctor's clinic name and phone number    Your medical, surgical and anesthesia history    A list of allergies and sensitivities    A list of medicines, including herbal treatments and over-the-counter drugs    Whether the patient has a legal guardian (ask how to send us the papers in advance)  Please tell us if you're pregnant--or if there's any chance you might be pregnant. Some surgeries may injure a fetus (unborn baby), so they require a pregnancy test. Surgeries that are safe for a fetus don't always need a test, and you can choose whether to have one.   If you have a child who's having surgery, please ask for a copy of Preparing for Your Child's Surgery.    Preparing for surgery    Within 30 days of surgery: Have a pre-op exam (sometimes called an H&P, or History and Physical). This can be done at a clinic or pre-operative center.  ? If you're having a , you may not need this exam. Talk to your care team.    At your pre-op exam, talk to your care team about all medicines you take. If you need to stop any medicines before surgery, ask when to start taking them again.  ? We do this for your safety. Many medicines can make you bleed too much during surgery. Some change how well surgery (anesthesia) drugs work.    Call your insurance company to let them know you're having surgery. (If you don't have insurance, call [phone].)    Call your clinic if there's any change in your health. This includes signs of a cold or flu (sore throat, runny nose, cough, rash, fever). It also includes a scrape or scratch near the surgery site.    If you have questions on the day of surgery, call your hospital or surgery center.  COVID testing  You may need to be tested for COVID-19 before having  surgery. If so, your surgical team will give you instructions for scheduling this test, separate from your preoperative history and physical.  Eating and drinking guidelines  For your safety: Unless your surgeon tells you otherwise, follow the guidelines below.    Eat and drink as usual until 8 hours before surgery. After that, no food or milk.    Drink clear liquids until 2 hours before surgery. These are liquids you can see through, like water, Gatorade and Propel Water. You may also have black coffee and tea (no cream or milk).    Nothing by mouth within 2 hours of surgery. This includes gum, candy and breath mints.    If you drink alcohol: Stop drinking it the night before surgery.    If your care team tells you to take medicine on the morning of surgery, it's okay to take it with a sip of water.  Preventing infection    Shower or bathe the night before and morning of your surgery. Follow the instructions your clinic gave you. (If no instructions, use regular soap.)    Don't shave or clip hair near your surgery site. We'll remove the hair if needed.    Don't smoke or vape the morning of surgery. You may chew nicotine gum up to 2 hours before surgery. A nicotine patch is okay.  ? Note: Some surgeries require you to completely quit smoking and nicotine. Check with your surgeon.    Your care team will make every effort to keep you safe from infection. We will:  ? Clean our hands often with soap and water (or an alcohol-based hand rub).  ? Clean the skin at your surgery site with a special soap that kills germs.  ? Give you a special gown to keep you warm. (Cold raises the risk of infection.)  ? Wear special hair covers, masks, gowns and gloves during surgery.  ? Give antibiotic medicine, if prescribed. Not all surgeries need antibiotics.  What to bring on the day of surgery    Photo ID and insurance card    Copy of your health care directive, if you have one    Glasses and hearing aides (bring cases)  ? You can't  wear contacts during surgery    Inhaler and eye drops, if you use them (tell us about these when you arrive)    CPAP machine or breathing device, if you use them    A few personal items, if spending the night    If you have . . .  ? A pacemaker, ICD (cardiac defibrillator) or other implant: Bring the ID card.  ? An implanted stimulator: Bring the remote control.  ? A legal guardian: Bring a copy of the certified (court-stamped) guardianship papers.  Please remove any jewelry, including body piercings. Leave jewelry and other valuables at home.  If you're going home the day of surgery    You must have a responsible adult drive you home. They should stay with you overnight as well.    If you don't have someone to stay with you, and you aren't safe to go home alone, we may keep you overnight. Insurance often won't pay for this.  After surgery  If it's hard to control your pain or you need more pain medicine, please call your surgeon's office.  Questions?   If you have any questions for your care team, list them here: _________________________________________________________________________________________________________________________________________________________________________ ____________________________________ ____________________________________ ____________________________________  For informational purposes only. Not to replace the advice of your health care provider. Copyright   2003, 2019 [facility]. All rights reserved. Clinically reviewed by Alexandria Ferguson MD. [employer] [id number] - REV 07/21.

## 2022-02-24 NOTE — NURSING NOTE
Today's pre-op notes were faxed to Dr. Su Stevens at [facility] at [phone].

## 2022-02-24 NOTE — PROGRESS NOTES
[place] [address] [facility] [address]  Phone: [phone]  Primary Provider: Andrae Burns  Pre-op Performing Provider: YURIDIA DEWITT      PREOPERATIVE EVALUATION:  Today's date: 2/24/2022    Nita Trevizo is a 71 year old female who presents for a preoperative evaluation.    Surgical Information:  Surgery/Procedure: Revision left wrist  Surgery Location: [facility]  Surgeon: Dr. Su Stevens  Surgery Date: 3/1/22  Time of Surgery: 9:30 AM  Where patient plans to recover: At home with family  Fax number for surgical facility: [phone]    Type of Anesthesia Anticipated: General    Assessment & Plan     The proposed surgical procedure is considered INTERMEDIATE risk.    Preop general physical exam    - EKG 12-lead complete w/read - Clinics  - CBC with platelets; Future  - Basic metabolic panel  (Ca, Cl, CO2, Creat, Gluc, K, Na, BUN); Future         Risks and Recommendations:  The patient has the following additional risks and recommendations for perioperative complications:   - No identified additional risk factors other than previously addressed    Medication Instructions:  Patient is on no chronic medications    RECOMMENDATION:  APPROVAL GIVEN to proceed with proposed procedure, without further diagnostic evaluation.                      Subjective     HPI related to upcoming procedure: L wrist revision    Preop Questions 2/17/2022   1. Have you ever had a heart attack or stroke? No   2. Have you ever had surgery on your heart or blood vessels, such as a stent placement, a coronary artery bypass, or surgery on an artery in your head, neck, heart, or legs? No   3. Do you have chest pain with activity? No   4. Do you have a history of  heart failure? No   5. Do you currently have a cold, bronchitis or symptoms of other infection? No   6. Do you have a cough, shortness of breath, or wheezing? No   7. Do you or anyone in  your family have previous history of blood clots? No   8. Do you or does anyone in your family have a serious bleeding problem such as prolonged bleeding following surgeries or cuts? No   9. Have you ever had problems with anemia or been told to take iron pills? No   10. Have you had any abnormal blood loss such as black, tarry or bloody stools, or abnormal vaginal bleeding? No   11. Have you ever had a blood transfusion? No   12. Are you willing to have a blood transfusion if it is medically needed before, during, or after your surgery? Yes   13. Have you or any of your relatives ever had problems with anesthesia? No   14. Do you have sleep apnea, excessive snoring or daytime drowsiness? No   15. Do you have any artifical heart valves or other implanted medical devices like a pacemaker, defibrillator, or continuous glucose monitor? No   16. Do you have artificial joints? No   17. Are you allergic to latex? No   18. Is there any chance that you may be pregnant? -       Health Care Directive:  Patient does not have a Health Care Directive or Living Will: Patient states has Advance Directive and will bring in a copy to clinic.    Preoperative Review of :   reviewed - no record of controlled substances prescribed.          Review of Systems  CONSTITUTIONAL: NEGATIVE for fever, chills, change in weight  EYES: NEGATIVE for vision changes or irritation  ENT/MOUTH: NEGATIVE for ear, mouth and throat problems  RESP: NEGATIVE for significant cough or SOB  CV: NEGATIVE for chest pain, palpitations or peripheral edema  GI: NEGATIVE for nausea, abdominal pain, heartburn, or change in bowel habits  : NEGATIVE for frequency, dysuria, or hematuria  MUSCULOSKELETAL: NEGATIVE for significant arthralgias or myalgia  NEURO: NEGATIVE for weakness, dizziness or paresthesias  ENDOCRINE: NEGATIVE for temperature intolerance, skin/hair changes  HEME: NEGATIVE for bleeding problems  PSYCHIATRIC: NEGATIVE for changes in mood or  affect    Patient Active Problem List    Diagnosis Date Noted     Irritable bowel syndrome with both constipation and diarrhea 05/28/2020     Priority: Medium     Dry eyes, bilateral 10/12/2016     Priority: Medium     Osteopenia 08/01/2016     Priority: Medium     Esophageal reflux 06/27/2006     Priority: Medium     Chronic maxillary sinusitis 08/03/2004     Priority: Medium      Past Medical History:   Diagnosis Date     Arthritis      Disorder of bone and cartilage, unspecified      Esophageal reflux      Osteopenia 6/14    -1.5     Past Surgical History:   Procedure Laterality Date     ABDOMEN SURGERY       BLADDER SURGERY       COLONOSCOPY       COLONOSCOPY N/A 10/16/2020    Procedure: COLONOSCOPY with random colon biopsies;  Surgeon: Louie Patiño MD;  Location:  GI     CYSTOSCOPY       GENITOURINARY SURGERY       HC REMOVAL GALLBLADDER       HC REMOVE TONSILS/ADENOIDS,<11 Y/O       ORTHOPEDIC SURGERY      wrist 2017 [facility]      VASCULAR SURGERY       ZZC APPENDECTOMY       ZZC LIGATE FALLOPIAN TUBE       ZZC NONSPECIFIC PROCEDURE      vein stripping x 3     ZZC TOTAL ABDOM HYSTERECTOMY      for fibroids with BSO     Current Outpatient Medications   Medication Sig Dispense Refill     fluticasone (FLONASE) 50 MCG/ACT nasal spray Spray 1 spray into both nostrils daily       IBUPROFEN 200 MG OR TABS 2 TABLET EVERY 4 TO 6 HOURS AS NEEDED for headaches        loratadine (CLARITIN) 10 MG tablet Take 10 mg by mouth daily       Melatonin 10 MG TABS tablet Take 10 mg by mouth nightly as needed for sleep       nitroGLYcerin (NITROSTAT) 0.4 MG sublingual tablet Place 1 tablet (0.4 mg) under the tongue every 5 minutes as needed for chest pain If you are still having symptoms after 3 doses (15 minutes) call 911. 25 tablet 0     polyethylene glycol (MIRALAX/GLYCOLAX) powder Take 1 capful by mouth as needed for constipation       Propylene Glycol (SYSTANE COMPLETE OP)        RESTASIS 0.05 % ophthalmic emulsion  "INSTILL ONE DROP IN EACH EYE TWO TIMES A DAY  3       Allergies   Allergen Reactions     Ciprofloxacin      Rash, eyelids red puffy & itchy     Codeine      Esophogeal spasms     Erythromycin         Social History     Tobacco Use     Smoking status: Never Smoker     Smokeless tobacco: Never Used   Substance Use Topics     Alcohol use: No     Alcohol/week: 0.0 standard drinks       History   Drug Use No         Objective     /80 (BP Location: Right arm, Patient Position: Sitting, Cuff Size: Adult Large)   Pulse 92   Temp 98.2  F (36.8  C) (Tympanic)   Resp 18   Ht 1.651 m (5' 5\")   Wt 72.6 kg (160 lb)   SpO2 99%   BMI 26.63 kg/m      Physical Exam    GENERAL APPEARANCE: healthy, alert and no distress     NECK: no adenopathy, no asymmetry, masses, or scars and thyroid normal to palpation     RESP: lungs clear to auscultation - no rales, rhonchi or wheezes     CV: regular rates and rhythm, normal S1 S2, no S3 or S4 and no murmur, click or rub     ABDOMEN:  soft, nontender, no HSM or masses and bowel sounds normal     NEURO: Normal strength and tone, sensory exam grossly normal, mentation intact and speech normal     PSYCH: mentation appears normal. and affect normal/bright     LYMPHATICS: No cervical adenopathy    Recent Labs   Lab Test 09/13/21  1020 07/06/21  1143 02/10/21  1114   HGB  --  14.2 13.6    142  --    POTASSIUM 4.5 4.3 4.2   CR 0.72 0.80  --         Diagnostics:  Labs pending at this time.  Results will be reviewed when available.   EKG: appears normal, NSR    Revised Cardiac Risk Index (RCRI):  The patient has the following serious cardiovascular risks for perioperative complications:   - No serious cardiac risks = 0 points     RCRI Interpretation: 0 points: Class I (very low risk - 0.4% complication rate)           Signed Electronically by: Deidre Lowry NP  Copy of this evaluation report is provided to requesting physician.      "

## 2022-02-25 LAB
ANION GAP SERPL CALCULATED.3IONS-SCNC: 5 MMOL/L (ref 3–14)
BUN SERPL-MCNC: 21 MG/DL (ref 7–30)
CALCIUM SERPL-MCNC: 9.2 MG/DL (ref 8.5–10.1)
CHLORIDE BLD-SCNC: 108 MMOL/L (ref 94–109)
CO2 SERPL-SCNC: 28 MMOL/L (ref 20–32)
CREAT SERPL-MCNC: 0.77 MG/DL (ref 0.52–1.04)
GFR SERPL CREATININE-BSD FRML MDRD: 83 ML/MIN/1.73M2
GLUCOSE BLD-MCNC: 86 MG/DL (ref 70–99)
POTASSIUM BLD-SCNC: 3.9 MMOL/L (ref 3.4–5.3)
SODIUM SERPL-SCNC: 141 MMOL/L (ref 133–144)

## 2022-06-20 ENCOUNTER — HOSPITAL ENCOUNTER (EMERGENCY)
Facility: CLINIC | Age: 72
Discharge: HOME | End: 2022-06-20
Attending: EMERGENCY MEDICINE | Admitting: EMERGENCY MEDICINE
Payer: COMMERCIAL

## 2022-06-20 ENCOUNTER — APPOINTMENT (OUTPATIENT)
Dept: ULTRASOUND IMAGING | Facility: CLINIC | Age: 72
End: 2022-06-20
Attending: EMERGENCY MEDICINE
Payer: COMMERCIAL

## 2022-06-20 ENCOUNTER — APPOINTMENT (OUTPATIENT)
Dept: GENERAL RADIOLOGY | Facility: CLINIC | Age: 72
End: 2022-06-20
Attending: EMERGENCY MEDICINE
Payer: COMMERCIAL

## 2022-06-20 ENCOUNTER — NURSE TRIAGE (OUTPATIENT)
Dept: INTERNAL MEDICINE | Facility: CLINIC | Age: 72
End: 2022-06-20
Payer: COMMERCIAL

## 2022-06-20 VITALS
HEIGHT: 65 IN | TEMPERATURE: 97.3 F | BODY MASS INDEX: 29.13 KG/M2 | RESPIRATION RATE: 16 BRPM | HEART RATE: 87 BPM | SYSTOLIC BLOOD PRESSURE: 145 MMHG | OXYGEN SATURATION: 99 % | WEIGHT: 174.82 LBS | DIASTOLIC BLOOD PRESSURE: 77 MMHG

## 2022-06-20 DIAGNOSIS — R06.09 DOE (DYSPNEA ON EXERTION): ICD-10-CM

## 2022-06-20 DIAGNOSIS — R60.0 LOWER EXTREMITY EDEMA: ICD-10-CM

## 2022-06-20 LAB
ALBUMIN UR-MCNC: NEGATIVE MG/DL
ANION GAP SERPL CALCULATED.3IONS-SCNC: 5 MMOL/L (ref 3–14)
APPEARANCE UR: CLEAR
BASOPHILS # BLD AUTO: 0.1 10E3/UL (ref 0–0.2)
BASOPHILS NFR BLD AUTO: 1 %
BILIRUB UR QL STRIP: NEGATIVE
BUN SERPL-MCNC: 18 MG/DL (ref 7–30)
CALCIUM SERPL-MCNC: 9 MG/DL (ref 8.5–10.1)
CHLORIDE BLD-SCNC: 109 MMOL/L (ref 94–109)
CO2 SERPL-SCNC: 29 MMOL/L (ref 20–32)
COLOR UR AUTO: ABNORMAL
CREAT SERPL-MCNC: 0.69 MG/DL (ref 0.52–1.04)
EOSINOPHIL # BLD AUTO: 0.3 10E3/UL (ref 0–0.7)
EOSINOPHIL NFR BLD AUTO: 4 %
ERYTHROCYTE [DISTWIDTH] IN BLOOD BY AUTOMATED COUNT: 14.5 % (ref 10–15)
FLUAV RNA SPEC QL NAA+PROBE: NEGATIVE
FLUBV RNA RESP QL NAA+PROBE: NEGATIVE
GFR SERPL CREATININE-BSD FRML MDRD: >90 ML/MIN/1.73M2
GLUCOSE BLD-MCNC: 87 MG/DL (ref 70–99)
GLUCOSE UR STRIP-MCNC: NEGATIVE MG/DL
HCT VFR BLD AUTO: 43.8 % (ref 35–47)
HGB BLD-MCNC: 13.4 G/DL (ref 11.7–15.7)
HGB UR QL STRIP: NEGATIVE
HOLD SPECIMEN: NORMAL
HYALINE CASTS: 1 /LPF
IMM GRANULOCYTES # BLD: 0 10E3/UL
IMM GRANULOCYTES NFR BLD: 0 %
KETONES UR STRIP-MCNC: ABNORMAL MG/DL
LEUKOCYTE ESTERASE UR QL STRIP: NEGATIVE
LYMPHOCYTES # BLD AUTO: 1.1 10E3/UL (ref 0.8–5.3)
LYMPHOCYTES NFR BLD AUTO: 15 %
MCH RBC QN AUTO: 27.1 PG (ref 26.5–33)
MCHC RBC AUTO-ENTMCNC: 30.6 G/DL (ref 31.5–36.5)
MCV RBC AUTO: 89 FL (ref 78–100)
MONOCYTES # BLD AUTO: 0.6 10E3/UL (ref 0–1.3)
MONOCYTES NFR BLD AUTO: 8 %
MUCOUS THREADS #/AREA URNS LPF: PRESENT /LPF
NEUTROPHILS # BLD AUTO: 5.3 10E3/UL (ref 1.6–8.3)
NEUTROPHILS NFR BLD AUTO: 72 %
NITRATE UR QL: NEGATIVE
NRBC # BLD AUTO: 0 10E3/UL
NRBC BLD AUTO-RTO: 0 /100
NT-PROBNP SERPL-MCNC: 49 PG/ML (ref 0–900)
PH UR STRIP: 6.5 [PH] (ref 5–7)
PLATELET # BLD AUTO: 220 10E3/UL (ref 150–450)
POTASSIUM BLD-SCNC: 3.6 MMOL/L (ref 3.4–5.3)
RBC # BLD AUTO: 4.95 10E6/UL (ref 3.8–5.2)
RBC URINE: <1 /HPF
RSV RNA SPEC NAA+PROBE: NEGATIVE
SARS-COV-2 RNA RESP QL NAA+PROBE: NEGATIVE
SODIUM SERPL-SCNC: 143 MMOL/L (ref 133–144)
SP GR UR STRIP: 1.01 (ref 1–1.03)
SQUAMOUS EPITHELIAL: 1 /HPF
TROPONIN I SERPL HS-MCNC: 3 NG/L
UROBILINOGEN UR STRIP-MCNC: NORMAL MG/DL
WBC # BLD AUTO: 7.4 10E3/UL (ref 4–11)
WBC URINE: 1 /HPF

## 2022-06-20 PROCEDURE — 84484 ASSAY OF TROPONIN QUANT: CPT | Performed by: EMERGENCY MEDICINE

## 2022-06-20 PROCEDURE — 99285 EMERGENCY DEPT VISIT HI MDM: CPT | Mod: 25

## 2022-06-20 PROCEDURE — 36415 COLL VENOUS BLD VENIPUNCTURE: CPT | Performed by: EMERGENCY MEDICINE

## 2022-06-20 PROCEDURE — 87637 SARSCOV2&INF A&B&RSV AMP PRB: CPT | Performed by: EMERGENCY MEDICINE

## 2022-06-20 PROCEDURE — 83880 ASSAY OF NATRIURETIC PEPTIDE: CPT | Performed by: EMERGENCY MEDICINE

## 2022-06-20 PROCEDURE — 80048 BASIC METABOLIC PNL TOTAL CA: CPT | Performed by: EMERGENCY MEDICINE

## 2022-06-20 PROCEDURE — 93005 ELECTROCARDIOGRAM TRACING: CPT

## 2022-06-20 PROCEDURE — 71045 X-RAY EXAM CHEST 1 VIEW: CPT

## 2022-06-20 PROCEDURE — 85014 HEMATOCRIT: CPT | Performed by: EMERGENCY MEDICINE

## 2022-06-20 PROCEDURE — 93970 EXTREMITY STUDY: CPT

## 2022-06-20 PROCEDURE — 81001 URINALYSIS AUTO W/SCOPE: CPT | Performed by: EMERGENCY MEDICINE

## 2022-06-20 PROCEDURE — 82310 ASSAY OF CALCIUM: CPT | Performed by: EMERGENCY MEDICINE

## 2022-06-20 PROCEDURE — 85025 COMPLETE CBC W/AUTO DIFF WBC: CPT | Performed by: EMERGENCY MEDICINE

## 2022-06-20 ASSESSMENT — ENCOUNTER SYMPTOMS
BACK PAIN: 1
UNEXPECTED WEIGHT CHANGE: 1
SHORTNESS OF BREATH: 1

## 2022-06-20 NOTE — ED PROVIDER NOTES
History   Chief Complaint:  Shortness of Breath     The history is provided by the patient.      Niat Trevizo is a 71 year old female with history of COVID-19 who presents with shortness of breath. Symptoms have been ongoing for 3 weeks. Today, she reports worsening leg swelling with associated weight gain and back pain. No trauma. She previously tried Toradol and muscle relaxers with some relief. Back pain is exacerbated when she turns in bed or with walking. She denies past medical history of heart issues, but her mother  of congestive heart failure. She returned from [place] early this morning.     Review of Systems   Constitutional: Positive for unexpected weight change.   Respiratory: Positive for shortness of breath.    Cardiovascular: Positive for leg swelling.   Musculoskeletal: Positive for back pain.   All other systems reviewed and are negative.        Allergies:  Ciprofloxacin  Codeine  Erythromycin    Medications:  Loratadine   Melatonin   Nitroglycerin     Past Medical History:     Arthritis   Disorder of bone and cartilage, unspecified   Esophageal reflux   Osteopenia   Chronic maxillary sinusitis  Dry eyes, bilateral  Irritable bowel syndrome  COVID-19     Past Surgical History:    Abdomen surgery   Bladder surgery   Remove gallbladder   Tonsillectomy and Adenoidectomy   Wrist surgery   Vein stripping   Hysterectomy for fibroids with BSO  Appendectomy   Ligate fallopian tube    Family History:    Mother: diabetes mellitus, hypertension, obesity, cataract, hay fever, hearing loss, heart attack, heart failure, Irritable bowel syndrome   Daughter celiac disease   Brother: substance abuse, Hepatitis C    Social History:  Presents alone.  Patient returend from a [place] today at midnight. The flight was 3 hours.   PCP: Andrae Burns      Physical Exam     Patient Vitals for the past 24 hrs:   BP Temp Temp src Pulse Resp SpO2 Height Weight   22 1630 (!) 145/77 -- -- 87 16  "99 % -- --   06/20/22 1600 132/60 -- -- 87 -- 98 % -- --   06/20/22 1545 -- -- -- -- -- 98 % -- --   06/20/22 1530 (!) 140/68 -- -- 85 -- 98 % -- --   06/20/22 1520 -- -- -- -- -- 97 % -- --   06/20/22 1515 (!) 151/74 -- -- 88 -- -- -- --   06/20/22 1415 -- -- -- 87 -- 98 % -- --   06/20/22 1400 (!) 145/75 -- -- 85 -- 99 % -- --   06/20/22 1345 -- -- -- 86 -- 98 % -- --   06/20/22 1330 (!) 143/72 -- -- 89 -- 96 % -- --   06/20/22 1305 -- -- -- 92 18 97 % -- --   06/20/22 1300 (!) 163/83 -- -- 94 -- -- -- --   06/20/22 1239 (!) 164/89 97.3  F (36.3  C) Temporal 97 20 100 % 1.651 m (5' 5\") 79.3 kg (174 lb 13.2 oz)       Physical Exam    General: The patient is alert, in no respiratory distress.    HENT: Mucous membranes moist.    Cardiovascular: Regular rate and rhythm. Good pulses in all four extremities. Normal capillary refill and skin turgor.     Respiratory: Lungs are clear. No nasal flaring. No retractions. No wheezing, no crackles.    Gastrointestinal: Abdomen soft. No guarding, no rebound. No palpable hernias.     Musculoskeletal: Tenderness. Swelling of both lower extremities.     Skin: No rashes or petechiae.     Neurologic: The patient is alert and oriented x3. GCS 15. No testable cranial nerve deficit. Follows commands with clear and appropriate speech. Gives appropriate answers. Good strength in all extremities. No gross neurologic deficit. Gross sensation intact. Pupils are round and reactive. No meningismus.     Lymphatic: No cervical adenopathy. No lower extremity swelling.    Psychiatric: The patient is non-tearful.      Emergency Department Course   ECG:   ECG results from 06/20/22   EKG 12 lead     Value    Systolic Blood Pressure     Diastolic Blood Pressure     Ventricular Rate 92    Atrial Rate 92    KY Interval 168    QRS Duration 88        QTc 464    P Axis 59    R AXIS 5    T Axis 31    Interpretation ECG      Sinus rhythm  Normal ECG  When compared with ECG of 26-FEB-2007 04:57,  No " significant change was found         Imaging:  US Lower Extremity Venous Duplex Bilateral   Final Result   IMPRESSION: No evidence of deep venous thrombosis.      RAMIREZ PALMER MD            SYSTEM ID:  [id number]      XR Chest 1 View   Final Result   IMPRESSION: Negative chest.      JERSEY NEGRETE MD            SYSTEM ID:  [id number]        Report per radiology    Laboratory:  Labs Ordered and Resulted from Time of ED Arrival to Time of ED Departure   CBC WITH PLATELETS AND DIFFERENTIAL - Abnormal       Result Value    WBC Count 7.4      RBC Count 4.95      Hemoglobin 13.4      Hematocrit 43.8      MCV 89      MCH 27.1      MCHC 30.6 (*)     RDW 14.5      Platelet Count 220      % Neutrophils 72      % Lymphocytes 15      % Monocytes 8      % Eosinophils 4      % Basophils 1      % Immature Granulocytes 0      NRBCs per 100 WBC 0      Absolute Neutrophils 5.3      Absolute Lymphocytes 1.1      Absolute Monocytes 0.6      Absolute Eosinophils 0.3      Absolute Basophils 0.1      Absolute Immature Granulocytes 0.0      Absolute NRBCs 0.0     ROUTINE UA WITH MICROSCOPIC REFLEX TO CULTURE - Abnormal    Color Urine Light Yellow      Appearance Urine Clear      Glucose Urine Negative      Bilirubin Urine Negative      Ketones Urine Trace (*)     Specific Gravity Urine 1.012      Blood Urine Negative      pH Urine 6.5      Protein Albumin Urine Negative      Urobilinogen Urine Normal      Nitrite Urine Negative      Leukocyte Esterase Urine Negative      Mucus Urine Present (*)     RBC Urine <1      WBC Urine 1      Squamous Epithelials Urine 1      Hyaline Casts Urine 1     BASIC METABOLIC PANEL - Normal    Sodium 143      Potassium 3.6      Chloride 109      Carbon Dioxide (CO2) 29      Anion Gap 5      Urea Nitrogen 18      Creatinine 0.69      Calcium 9.0      Glucose 87      GFR Estimate >90     TROPONIN I - Normal    Troponin I High Sensitivity 3     NT PROBNP INPATIENT - Normal    N terminal Pro BNP Inpatient 49      INFLUENZA A/B & SARS-COV2 PCR MULTIPLEX - Normal    Influenza A PCR Negative      Influenza B PCR Negative      RSV PCR Negative      SARS CoV2 PCR Negative        Emergency Department Course:         Reviewed:  I reviewed nursing notes, vitals, past medical history and Care Everywhere    Assessments:  1350 I obtained history and examined the patient as noted above.   1635 I rechecked the patient and explained findings.     Disposition:  The patient was discharged to home.     Impression & Plan         Medical Decision Making:  While the patient complains of symptoms very suggestive of CHF her labs do not indicate this condition.  The patient does not have orthopnea or PND.  She has had a some weight gain but some chronic lower extremity swelling.  There is some mild pitting and tenderness.  Her kidney function is intact and if this not renal failure.  I considered cirrhosis and CHF.  Her BNP is negative her lungs are clear.  At this point I stressed the patient that elevating her legs would be helpful this may be due to failed valves in her lower extremities symptomatic treatment discussed including long lowering her sodium.  I stressed to him to follow-up with her primary care doctor and may need further outpatient studies such as an echo but at this point she only has mild dyspnea exertion and weight gain.  The patient will follow-up as an outpatient and was discharged home in good condition.  There is no signs of anemia hypoxia or other life-threatening condition.  I do not think the patient has ACS.    Diagnosis:    ICD-10-CM    1. Lower extremity edema  R60.0    2. BRAUN (dyspnea on exertion)  R06.00        Scribe Disclosure:  I, Tremayne Haley, am serving as a scribe at 1:38 PM on 6/20/2022 to document services personally performed by Mesfin Galicia MD based on my observations and the provider's statements to me.          Mesfin Galicia MD  06/20/22 [id number]

## 2022-06-20 NOTE — TELEPHONE ENCOUNTER
"Call received from patient stating she is having swelling in both ankles left greater than right. Swelling goes into calves. States ankles are very tight and are painful and feel like they are burning. States she is not able to leave a finger print but when she pulls her socks down there is an indentation. Swelling is usually worse late in the day but states this morning they are still \"puffy and tender\". States swelling was discussed at last well visit with primary care provider but has gotten worse over the past 3 weeks. [place]. Patient is also having pain in her lower back on the left side. Pain radiates toward the front. Patient states was seen for back spasms last month in urgent care but this pain feels different than a muscle spasm. States she was given a muscle relaxer. Patient initially thought swelling was related to the muscle relaxer but swelling has not improved since stopping it and has actually worsened. Patient also feels she gets out of breath very easily. States she had surgery in March so feels she may be deconditioned after that. States the shortness of breath started after surgery. Reports pulse goes into the 130s with activity and she feels short of breath. This only occurs with activity. States she has gained 20 lbs since COVID so feels this may also be related. Patient has a smart watch that she says does an EKG that shows she is in normal sinus rhythm. Advised per protocol patient should be seen in ER if shortness of breath has been worsening. Patient states she is not sure if it is worsening or if she is just noticing it more. Advised per protocol patient should be seen within the next 4 hours due to swelling worse on one side. Offered appointment this afternoon. Patient lives in [place]. Patient asking if she would come for appointment today if they would be able to do any needed imaging today or if she would need to come back if imaging was " "needed. Advised xray could be done on site but any other imaging would need to be scheduled at a later time. Patient states she will likely go to an ER so she can have any needed imaging done all at the same time. States she likely will not go today because she just [place] during the night last night so she is very tired. States she will likely go tomorrow. Advised recommendation is that she be seen today. Patient states she thinks she will \"be OK to wait another day\" because she has gone this long already. Again advised patient be seen today but if she chooses not to go and has any worsening shortness of breath, leg pan or chest pain she needs to be seen as soon as possible. Patient agrees.     Reason for Disposition    Swelling of both ankles (i.e., pedal edema)    [1] Thigh, calf, or ankle swelling AND [2] bilateral AND [3] 1 side is more swollen    Additional Information    Negative: Chest pain    Negative: Followed an ankle injury    Negative: Ankle pain is main symptom    Negative: Severe difficulty breathing (e.g., struggling for each breath, speaks in single words)    Negative: Looks like a broken bone or dislocated joint (e.g., crooked or deformed)    Negative: Sounds like a life-threatening emergency to the triager    Negative: Chest pain    Negative: Followed a leg injury    Negative: [1] Small area of swelling AND [2] followed an insect bite to the area    Negative: Swelling of one ankle joint    Negative: Swelling of knee is main symptom    Negative: Pregnant    Negative: Postpartum (from 0 to 6 weeks after delivery)    Negative: Difficulty breathing at rest    Negative: Entire foot is cool or blue in comparison to other side    Negative: [1] Can't walk or can barely walk AND [2] new onset    Negative: [1] Difficulty breathing with exertion (e.g., walking) AND [2] new onset or worsening    Negative: [1] Red area or streak AND [2] fever    Negative: [1] Swelling is painful to touch " AND [2] fever    Negative: [1] Cast on leg or ankle AND [2] now increased pain    Negative: Patient sounds very sick or weak to the triager    Negative: SEVERE leg swelling (e.g., swelling extends above knee, entire leg is swollen, weeping fluid)    Negative: [1] Red area or streak [2] large (> 2 in. or 5 cm)    Negative: [1] Thigh or calf pain AND [2] only 1 side AND [3] present > 1 hour    Negative: [1] Thigh, calf, or ankle swelling AND [2] only 1 side    Protocols used: ANKLE SWELLING-A-AH, LEG SWELLING AND EDEMA-A-AH

## 2022-06-20 NOTE — ED TRIAGE NOTES
A&O x4.  ABC's intact.      Pt arrives with c/o SOB with activity, with feet & ankle swelling, and left back pain with getting up. Symptoms started 3 wks ago which are gradually getting worse.    Did fly back from [facility] last night at midnight d/t RV home is getting fixed where her  currently is.

## 2022-06-21 LAB
ATRIAL RATE - MUSE: 92 BPM
DIASTOLIC BLOOD PRESSURE - MUSE: NORMAL MMHG
INTERPRETATION ECG - MUSE: NORMAL
P AXIS - MUSE: 59 DEGREES
PR INTERVAL - MUSE: 168 MS
QRS DURATION - MUSE: 88 MS
QT - MUSE: 376 MS
QTC - MUSE: 464 MS
R AXIS - MUSE: 5 DEGREES
SYSTOLIC BLOOD PRESSURE - MUSE: NORMAL MMHG
T AXIS - MUSE: 31 DEGREES
VENTRICULAR RATE- MUSE: 92 BPM

## 2022-06-29 ENCOUNTER — OFFICE VISIT (OUTPATIENT)
Dept: INTERNAL MEDICINE | Facility: CLINIC | Age: 72
End: 2022-06-29
Payer: COMMERCIAL

## 2022-06-29 VITALS
BODY MASS INDEX: 27.57 KG/M2 | TEMPERATURE: 97.9 F | SYSTOLIC BLOOD PRESSURE: 155 MMHG | RESPIRATION RATE: 16 BRPM | DIASTOLIC BLOOD PRESSURE: 76 MMHG | WEIGHT: 165.5 LBS | HEIGHT: 65 IN | HEART RATE: 74 BPM | OXYGEN SATURATION: 99 %

## 2022-06-29 DIAGNOSIS — I10 ESSENTIAL HYPERTENSION: Primary | ICD-10-CM

## 2022-06-29 DIAGNOSIS — K52.9 COLITIS: ICD-10-CM

## 2022-06-29 DIAGNOSIS — R60.0 LOWER EXTREMITY EDEMA: ICD-10-CM

## 2022-06-29 DIAGNOSIS — R10.9 LEFT FLANK PAIN: ICD-10-CM

## 2022-06-29 DIAGNOSIS — M54.42 LEFT-SIDED LOW BACK PAIN WITH LEFT-SIDED SCIATICA, UNSPECIFIED CHRONICITY: ICD-10-CM

## 2022-06-29 PROCEDURE — 99214 OFFICE O/P EST MOD 30 MIN: CPT | Performed by: INTERNAL MEDICINE

## 2022-06-29 RX ORDER — TRIAMTERENE/HYDROCHLOROTHIAZID 37.5-25 MG
1 TABLET ORAL DAILY
Qty: 90 TABLET | Refills: 1 | Status: SHIPPED | OUTPATIENT
Start: 2022-06-29 | End: 2022-07-26 | Stop reason: SINTOL

## 2022-06-29 NOTE — PROGRESS NOTES
"  Assessment & Plan     Essential hypertension  Will start Maxzide which should also help with her lower extremity edema. Follow up in 1 month   - triamterene-HCTZ (MAXZIDE-25) 37.5-25 MG tablet; Take 1 tablet by mouth daily    Lower extremity edema  as above.     Left-sided low back pain with left-sided sciatica, unspecified chronicity  New low back pain with new lower extremity edema and new hypertension will check CT to make sure we are not missing something. otherwise try PT   - Physical Therapy Referral; Future  - tiZANidine (ZANAFLEX) 4 MG tablet; Take 1 tablet (4 mg) by mouth nightly as needed for muscle spasms    Left flank pain  as above.   - CT Abdomen Pelvis w Contrast; Future             BMI:   Estimated body mass index is 27.54 kg/m  as calculated from the following:    Height as of this encounter: 1.651 m (5' 5\").    Weight as of this encounter: 75.1 kg (165 lb 8 oz).       No follow-ups on file.    Elsa Stevens MD  [facility] BETY Balbuena is a 71 year old, presenting for the following health issues:  ER F/U      HPI       ED/UC Followup:    Facility:  [facility]  Date of visit: 6-  Reason for visit: back pain & bilateral lower extremity edema- since recent trip to [place]    4-8 weeks ago developed left sided low back pain. Mainly at night when she rolls over in bed can be severe. During the day has minimal problems. She also developed bilateral lower extremity edema left worse than right. Was seen in ER and ultrasound was negative for DVT.     Her blood pressure has been elevated several times in the past year. Has not been addressed so far.     Review of Systems   Constitutional, HEENT, cardiovascular, pulmonary, GI, , musculoskeletal, neuro, skin, endocrine and psych systems are negative, except as otherwise noted.      Objective    BP (!) 155/76   Pulse 74   Temp 97.9  F (36.6  C) (Oral)   Resp 16   Ht 1.651 m (5' 5\")   Wt 75.1 kg (165 lb 8 oz)   " SpO2 99%   Breastfeeding No   BMI 27.54 kg/m    Body mass index is 27.54 kg/m .  Physical Exam   GENERAL: healthy, alert and no distress  NECK: no adenopathy, no asymmetry, masses, or scars and thyroid normal to palpation  RESP: lungs clear to auscultation - no rales, rhonchi or wheezes  CV: regular rate and rhythm, normal S1 S2, no S3 or S4, no murmur, click or rub, no peripheral edema and peripheral pulses strong  ABDOMEN: soft, nontender, no hepatosplenomegaly, no masses and bowel sounds normal  MS: 1+ edema to the knees bilateral left worse than right.   NEURO: Normal strength and tone, mentation intact and speech normal  PSYCH: mentation appears normal, affect normal/bright              .  ..

## 2022-07-05 ENCOUNTER — TELEPHONE (OUTPATIENT)
Dept: INTERNAL MEDICINE | Facility: CLINIC | Age: 72
End: 2022-07-05

## 2022-07-05 NOTE — TELEPHONE ENCOUNTER
Patient calls with questions about Hydrochlorothiazide. She is been taking it for 4 days and losing a pound a day. She c/o nausea and whipes her out.   She is asking how long this can last     Best number to call back [phone]

## 2022-07-06 NOTE — TELEPHONE ENCOUNTER
She may also want to consider holding the triamterene/hydrochlorothiazide combination tablet to see if her symptoms improve as well.

## 2022-07-06 NOTE — TELEPHONE ENCOUNTER
Message was not triaged    Hydrochlorothiazide was started at last appointment with Dr. Elsa Stevens on June 29

## 2022-07-06 NOTE — TELEPHONE ENCOUNTER
Patient informed of provider's message below.  She will not take medication 7/7/22 and 7/8/22 and call with update 7/8/22 afternoon.    Reports her blood pressure just now was:    Sitting 137/83  Walking 93/61    Will call back on Friday.

## 2022-07-06 NOTE — TELEPHONE ENCOUNTER
"S-(situation): fatigue, nausea, lightheaded since starting Triamterene hydrochlorothiazide 37.5-25 mg daily on 6/30/22 when seen for ER follow up.    B-(background): Seen in ER 6/20/22 for lower extremity edema, dyspnea on exertion, elevated BP.     A-(assessment): Patient reports feeling fatigued and light headed intermittently and constant nausea since starting Triamterene hydrochlorothiazide last week. States she is experiencing all of the side effects.  First dose she took was 6/30/22 and she felt poorly starting that day.  Swelling in lower extremities has almost fully disappeared.  Weight down 5.5 lbs. Since starting med. The light headedness occurs with position changes.  Patient feels she is drinking as much fluids as she can force down, using Tums with minimal relief, belching a lot, urinating well.  \"Even water tastes bad\". She also reports intermittent slight headache.  The AC is out in their home for the past week so feels headache may partly be from that.  Patient has not been checking her BP, encouraged to check BP a couple of times and call back with her readings.      R-(recommendations): Stay hydrated, stay in AC or in front of fan.  Check BP and call back with findings.  KIYA Lopez R.N.            "

## 2022-07-08 ENCOUNTER — HOSPITAL ENCOUNTER (OUTPATIENT)
Dept: CT IMAGING | Facility: CLINIC | Age: 72
Discharge: HOME | End: 2022-07-08
Attending: INTERNAL MEDICINE | Admitting: INTERNAL MEDICINE
Payer: COMMERCIAL

## 2022-07-08 DIAGNOSIS — R10.9 LEFT FLANK PAIN: ICD-10-CM

## 2022-07-08 PROCEDURE — 74177 CT ABD & PELVIS W/CONTRAST: CPT

## 2022-07-08 PROCEDURE — 250N000009 HC RX 250: Performed by: INTERNAL MEDICINE

## 2022-07-08 PROCEDURE — 250N000011 HC RX IP 250 OP 636: Performed by: INTERNAL MEDICINE

## 2022-07-08 RX ORDER — IOPAMIDOL 755 MG/ML
500 INJECTION, SOLUTION INTRAVASCULAR ONCE
Status: COMPLETED | OUTPATIENT
Start: 2022-07-08 | End: 2022-07-08

## 2022-07-08 RX ADMIN — IOPAMIDOL 80 ML: 755 INJECTION, SOLUTION INTRAVENOUS at 09:44

## 2022-07-08 RX ADMIN — SODIUM CHLORIDE 61 ML: 9 INJECTION, SOLUTION INTRAVENOUS at 09:44

## 2022-07-08 NOTE — TELEPHONE ENCOUNTER
Pt calls back. The first day of holding the medication, she still didn't feel good.   Today, she feels better. No nausea, no lightheadedness.    She is worried that if doesn't take the Maxide that her BP will go back up and her feet and ankles will swell.     But her symptoms are better.     Please advise. Also, she had her CT scan today.

## 2022-07-11 NOTE — TELEPHONE ENCOUNTER
See 7/8/22 entry below:    Patient states she felt poorly on the Triamterene/HCTZ with nausea and lightheadedness.  Last taken Wed. 7/6/22.  She now notes slight swelling around ankles, not as bad as last week but she doesn't want it to get to the point she cannot get her shoes on.  She is elevating feet as much as possible and keeps sodium intake to 1500 mg per day or less.   Asking for further direction.  KIYA Lopez R.N.

## 2022-07-11 NOTE — TELEPHONE ENCOUNTER
TRIAMTERENE/hydrochlorothiazide was started by Dr. Stevens.  Dr. Hurley recommended to stop the med and monitor symptoms, please check if she has stopped the medication, please advise virtual visit so we can discuss alternate medication and side effects

## 2022-07-12 NOTE — PROGRESS NOTES
Physical Therapy Initial Examination/Evaluation  July 13, 2022    Nita Trevizo is a 71 year old female referred to physical therapy by Elsa Stevens MD for treatment of L LBP with L sciatica with Precautions/Restrictions/MD instructions Eval and treat.    Therapist Impression:   Patient has complaints of LBP with onset a couple months ago when she went to stand. Patient decribes the pain as electrical shocks in AUREA LB, went to chiro and had a muscle spasm so she went to urgent care where she was given muscle relaxants. A CAT scan was also performed showing her GI is inflamed, also new high blood pressure med with low sodium diet prescribed. Lying in bed is worse, standing extension feels better. Has PMH of sciatica she gets 2-3x/year, never seen PT before. Patient found to have impaired lumbar ROM, weak hip ABD, +SLR, + spring testing and tenderness to piriformis, QLs. Patient given HEP with prone on elbows, repeated extension, TA contraction and standing hip ABD.     Subjective:  DOI/onset: 5/16/2022   Acute Injury or Gradual Onset?: Acute injury onset  Mechanism of Injury: sit > stand with electrical shock  Related PMH:   Imaging: CT: 1.  Mild diffuse bowel wall thickening and hyperemia throughout the  colon suggests a nonspecific pancolitis.  2.  Colonic diverticulosis, without evidence for diverticulitis.  3.  Diffuse fatty infiltration of the liver.  Chief Complaint/Functional Limitations: AUREA LBP  and see below in therapy evaluation codes   Pain: rest 0 /10, activity 6/10 Location: AUREA LBP Frequency: Intermittent Described as: dull, shooting and shocking Previous Treatment: Ice, Heat, Chiro, muscle relaxant, ibuprofen Effect of prior treatment: fair Alleviated by: Nothing Progression of Symptoms: Gradually getting better. Time of day when pain is worse: Night and Position related  Sleeping: Interrupted due to current issue   Occupation: retired medical assistant Job duties: housework  Current  "HEP/exercise regimen: Walking 20 mins   Patient's goals are see chief complaints \"To not have this back pain anymmore\"     Other pertinent PMH/Red Flags: changes in bowel/bladder, high blood pressure, menopausal, overweight, osteopenia    Other Allergies Detail: cipro, codeine  Barriers at home/work: Lives in [place]  Pertinent Surgical History: neuroma, hysterectomy, AUREA wrist, trigger finger, gallbladder removal  Medications: Anti-inflammatory and Muscle relaxants  General health as reported by patient: good  Return to MD:  PRN    Lumbar Spine Evaluation  Static Posture  Standing posture: No obvious findings  Sitting posture: Poor    Dynamic Movement Screen  Gait: No significant findings    Hip Joint Screen WNL    Trunk Range of Motion  Movement Loss Major Moderate Minimal Nil Pain   Flexion   X - to lower shins   X - crawled up LEs    Extension   X        Test Movements  Rep FIS Increases No effect   Rep EIS Nil No effect   Rep EIL Nil No effect     Flexibility Left Right   Hamstrings none/WNL none/WNL   Figure 4 none/WNL none/WNL     Hip and Knee Strength   MMT Left Right   Hip Flexion 5/5 5/5   Hip Extension 5/5 5/5   Hip Abduction 4/5 4/5   Hip ER 5/5 5/5   Hip IR 5/5 5/5     Special Tests  Spring testing: Positive T4-L5  Neural tension: Positive on R, Negative on L     Palpation:  Severe tenderness to palpation at R erector spinae at T12-L4, R QL, R piriforms  Mod tenderness to L QL, L piriformis     Assessment/Plan:  Patient is a 71 year old female with lumbar complaints.    Patient has the following significant findings with corresponding treatment plan.                Diagnosis 1:  AUREA LBP  Pain -  manual therapy and directional preference exercise  Decreased ROM/flexibility - manual therapy and therapeutic exercise  Decreased joint mobility - manual therapy and therapeutic exercise  Decreased strength - therapeutic exercise and therapeutic activities  Impaired muscle performance - neuro " re-education  Decreased function - therapeutic activities  Impaired posture - neuro re-education    Therapy Evaluation Codes:   1) History comprised of:   Personal factors that impact the plan of care:      None.    Comorbidity factors that impact the plan of care are:      changes in bowel/bladder, high blood pressure, menopausal, overweight, osteopenia.     Medications impacting care: Anti-inflammatory and Muscle relaxants.  2) Examination of Body Systems comprised of:   Body structures and functions that impact the plan of care:      Lumbar spine.   Activity limitations that impact the plan of care are:      Bending, Lifting, Squatting/kneeling, Walking, Sleeping and Laying down.  3) Clinical presentation characteristics are:   Stable/Uncomplicated.  4) Decision-Making    Low complexity using standardized patient assessment instrument and/or measureable assessment of functional outcome.  Cumulative Therapy Evaluation is: Low complexity.    Previous and current functional limitations:  (See Goal Flow Sheet for this information)    Short term and Long term goals: (See Goal Flow Sheet for this information)     Communication ability:  Patient appears to be able to clearly communicate and understand verbal and written communication and follow directions correctly.  Treatment Explanation - The following has been discussed with the patient:   RX ordered/plan of care  Anticipated outcomes  Possible risks and side effects  This patient would benefit from PT intervention to resume normal activities.   Rehab potential is good.    Frequency:  1 X week, once daily  Duration:  x4 weeks, tapering to 2x/month x 1 month  Discharge Plan:  Achieve all LTG.  Independent in home treatment program.  Reach maximal therapeutic benefit.    Please refer to the daily flowsheet for treatment today, total treatment time and time spent performing 1:1 timed codes.

## 2022-07-12 NOTE — TELEPHONE ENCOUNTER
Call placed to patient.    Patient last took medication on 7/6/22. Patient agreeable to VV with provider. Future appointment made.    Next 5 appointments (look out 90 days)    Jul 19, 2022  3:30 PM  (Arrive by 3:10 PM)  Provider Visit with Andrae Burns MD  [facility] ([facility] [address] [facility] [address]  [phone]   Jul 26, 2022  2:00 PM  (Arrive by 1:40 PM)  Provider Visit with Andrae Burns MD  [facility] ([facility] ) [address] [facility] [address]  [phone]

## 2022-07-13 ENCOUNTER — THERAPY VISIT (OUTPATIENT)
Dept: PHYSICAL THERAPY | Facility: CLINIC | Age: 72
End: 2022-07-13
Attending: INTERNAL MEDICINE
Payer: COMMERCIAL

## 2022-07-13 DIAGNOSIS — M54.42 LEFT-SIDED LOW BACK PAIN WITH LEFT-SIDED SCIATICA, UNSPECIFIED CHRONICITY: ICD-10-CM

## 2022-07-13 DIAGNOSIS — M54.50 LUMBAGO: Primary | ICD-10-CM

## 2022-07-13 PROCEDURE — 97110 THERAPEUTIC EXERCISES: CPT | Mod: GP

## 2022-07-13 PROCEDURE — 97161 PT EVAL LOW COMPLEX 20 MIN: CPT | Mod: GP

## 2022-07-14 ENCOUNTER — DOCUMENTATION ONLY (OUTPATIENT)
Dept: LAB | Facility: CLINIC | Age: 72
End: 2022-07-14

## 2022-07-18 PROBLEM — M54.50 LUMBAGO: Status: ACTIVE | Noted: 2022-07-18

## 2022-07-18 PROBLEM — M54.42 LEFT-SIDED LOW BACK PAIN WITH LEFT-SIDED SCIATICA, UNSPECIFIED CHRONICITY: Status: ACTIVE | Noted: 2022-07-18

## 2022-07-18 NOTE — PROGRESS NOTES
[facility]    OUTPATIENT Physical Therapy ORTHOPEDIC EVALUATION  PLAN OF TREATMENT FOR OUTPATIENT REHABILITATION  (COMPLETE FOR INITIAL CLAIMS ONLY)  Patient's Last Name, First Name, M.I.  YOB: 1950  Nita Trevizo    Provider s Name:  [facility]   Medical Record No.  [id number]   Start of Care Date:  07/13/22   Onset Date:       Type:     _X__PT   ___OT Medical Diagnosis:    Encounter Diagnoses   Name Primary?    Left-sided low back pain with left-sided sciatica, unspecified chronicity     Lumbago Yes        Treatment Diagnosis:  LBP w/ L sciatica        Goals:     07/13/22 0500   Body Part   Goals listed below are for LBP   Goal #1   Goal #1 sleeping   Previous Functional Level No restrictions;without meds   Performance Level Pain-free   Current Functional Level 2-3 hours without sleep per night   Performance Level LBP 5/10   STG Target Performance 1-2 hours without sleep per night   Performance Level LBP 3/10   Rationale to establish restorative sleep pattern   Due Date 08/13/22   LTG Target Performance Less than 1 hour without sleep per night   Performance level LBP <2/10   Rationale to establish restorative sleep pattern   Due Date 09/13/22       Therapy Frequency:  1x/week  Predicted Duration of Therapy Intervention:  x4 weeks, tapering to 2x/month [id number]    Claudine Pina, PT                 I CERTIFY THE NEED FOR THESE SERVICES FURNISHED UNDER        THIS PLAN OF TREATMENT AND WHILE UNDER MY CARE     (Physician attestation of this document indicates review and certification of the therapy plan).                     Certification Date From:  07/13/22   Certification Date To:  09/13/22    Referring Provider:  Elsa Stevens    Initial Assessment        See Epic Evaluation SOC Date: 07/13/22

## 2022-07-19 ENCOUNTER — VIRTUAL VISIT (OUTPATIENT)
Dept: INTERNAL MEDICINE | Facility: CLINIC | Age: 72
End: 2022-07-19
Payer: COMMERCIAL

## 2022-07-19 DIAGNOSIS — I10 ESSENTIAL HYPERTENSION: Primary | ICD-10-CM

## 2022-07-19 PROCEDURE — 99214 OFFICE O/P EST MOD 30 MIN: CPT | Mod: 95 | Performed by: INTERNAL MEDICINE

## 2022-07-19 RX ORDER — LISINOPRIL 10 MG/1
10 TABLET ORAL DAILY
Qty: 30 TABLET | Refills: 0 | Status: SHIPPED | OUTPATIENT
Start: 2022-07-19 | End: 2022-07-26

## 2022-07-19 NOTE — PROGRESS NOTES
"Esha is a 71 year old who is being evaluated via a billable video visit.      How would you like to obtain your AVS? MyChart  If the video visit is dropped, the invitation should be resent by: Text to cell phone: [phone]  Will anyone else be joining your video visit? No          Assessment & Plan     (I10) Essential hypertension  (primary encounter diagnosis)  Comment: Patient could not tolerate triamterene hydrochlorothiazide, and home blood pressures have been high  Plan: Started on lisinopril (ZESTRIL) 10 MG tablet as directed.explained clearly about the medication,insructions and side effects.   Advised to follow low salt diet and exercise          Prescription drug management        BMI:   Estimated body mass index is 27.54 kg/m  as calculated from the following:    Height as of 6/29/22: 1.651 m (5' 5\").    Weight as of 6/29/22: 75.1 kg (165 lb 8 oz).         Return in about 1 week (around 7/26/2022) for BP Recheck.    Andrae Burns MD  [facility]    Lexa Balbuena is a 71 year old presenting for the following health issues:  No chief complaint on file.      History of Present Illness       Hypertension: She presents for follow up of hypertension.  She does check blood pressure  regularly outside of the clinic. Outside blood pressures have been over 140/90. She follows a low salt diet.         Hypertension Follow-up      Do you check your blood pressure regularly outside of the clinic? Yes and blood pressures been in 140-158/80 range, patient was started on triamterene hydrochlorothiazide by Dr. Stevens, but patient could not tolerate had dizziness and stopped taking the medication on 7/7/2022.    Are you following a low salt diet? Yes    Are your blood pressures ever more than 140 on the top number (systolic) OR more   than 90 on the bottom number (diastolic), for example 140/90? Yes         Past Medical History:   Diagnosis Date     Arthritis      Disorder of " bone and cartilage, unspecified      Esophageal reflux      Osteopenia 6/14    -1.5       Current Outpatient Medications   Medication Sig Dispense Refill     fluticasone (FLONASE) 50 MCG/ACT nasal spray Spray 1 spray into both nostrils daily       IBUPROFEN 200 MG OR TABS 2 TABLET EVERY 4 TO 6 HOURS AS NEEDED for headaches        lisinopril (ZESTRIL) 10 MG tablet Take 1 tablet (10 mg) by mouth daily 30 tablet 0     loratadine (CLARITIN) 10 MG tablet Take 10 mg by mouth daily       Melatonin 10 MG TABS tablet Take 10 mg by mouth nightly as needed for sleep       nitroGLYcerin (NITROSTAT) 0.4 MG sublingual tablet Place 1 tablet (0.4 mg) under the tongue every 5 minutes as needed for chest pain If you are still having symptoms after 3 doses (15 minutes) call 911. 25 tablet 0     OMEPRAZOLE PO PRN       polyethylene glycol (MIRALAX/GLYCOLAX) powder Take 1 capful by mouth as needed for constipation       Propylene Glycol (SYSTANE COMPLETE OP)        RESTASIS 0.05 % ophthalmic emulsion INSTILL ONE DROP IN EACH EYE TWO TIMES A DAY  3     tiZANidine (ZANAFLEX) 4 MG tablet Take 1 tablet (4 mg) by mouth nightly as needed for muscle spasms 30 tablet 0     triamterene-HCTZ (MAXZIDE-25) 37.5-25 MG tablet Take 1 tablet by mouth daily 90 tablet 1         Review of Systems   CONSTITUTIONAL: NEGATIVE for fever, chills, change in weight  RESP: NEGATIVE for significant cough or SOB  CV: NEGATIVE for chest pain, palpitations or peripheral edema      Objective           Vitals:  No vitals were obtained today due to virtual visit.    Physical Exam   GENERAL: Healthy, alert and no distress  RESP: No audible wheeze, cough, or visible cyanosis.  No visible retractions or increased work of breathing.    PSYCH: Mentation appears normal, affect normal/bright, judgement and insight intact, normal speech and appearance well-groomed.           Video-Visit Details    Video Start Time: 3:06 PM    Type of service:  Video Visit    Video End Time:3:19  PM    Originating Location (pt. Location): Home    Distant Location (provider location):  [facility]     Platform used for Video Visit: Jose Jeffers

## 2022-07-26 ENCOUNTER — OFFICE VISIT (OUTPATIENT)
Dept: INTERNAL MEDICINE | Facility: CLINIC | Age: 72
End: 2022-07-26
Payer: COMMERCIAL

## 2022-07-26 VITALS
TEMPERATURE: 97.9 F | HEART RATE: 104 BPM | HEIGHT: 65 IN | OXYGEN SATURATION: 97 % | DIASTOLIC BLOOD PRESSURE: 70 MMHG | WEIGHT: 164.3 LBS | BODY MASS INDEX: 27.38 KG/M2 | SYSTOLIC BLOOD PRESSURE: 128 MMHG | RESPIRATION RATE: 14 BRPM

## 2022-07-26 DIAGNOSIS — I83.893 VARICOSE VEINS OF BOTH LEGS WITH EDEMA: Primary | ICD-10-CM

## 2022-07-26 DIAGNOSIS — I10 ESSENTIAL HYPERTENSION: ICD-10-CM

## 2022-07-26 PROCEDURE — 99214 OFFICE O/P EST MOD 30 MIN: CPT | Performed by: INTERNAL MEDICINE

## 2022-07-26 RX ORDER — LISINOPRIL 10 MG/1
10 TABLET ORAL DAILY
Qty: 90 TABLET | Refills: 0 | Status: SHIPPED | OUTPATIENT
Start: 2022-07-26 | End: 2022-09-21

## 2022-07-26 NOTE — NURSING NOTE
"/70   Pulse 104   Temp 97.9  F (36.6  C) (Tympanic)   Resp 14   Ht 1.651 m (5' 5\")   Wt 74.5 kg (164 lb 4.8 oz)   SpO2 97%   BMI 27.34 kg/m    Patient in for medication follow up.    " ASTHMA ACTION PLAN for Maria E Whitt     : 1996     Date: 2022  Provider:  Glover Goldberg, PA-C  Phone for doctor or clinic: [address] [phone]

## 2022-07-26 NOTE — PROGRESS NOTES
Assessment & Plan       (I10) Essential hypertension  Plan: Blood pressure significantly improved, was started on lisinopril at last office visit and tolerating well, refilled lisinopril (ZESTRIL) 10 MG tablet as directed.explained clearly about the medication,insructions and side effects.  Continue to follow low-sodium diet regular exercise            (I83.893) Varicose veins of both legs with edema   Plan: Currently trace edema, patient was advised to elevate lower extremities, wear compression socks, also advised to follow low-sodium diet and avoid NSAIDs.Call or return to clinic prn if these symtoms worsen, fail to improve as anticipated, or if new symptoms develop.      Prescription drug management       Return in about 8 weeks (around 9/21/2022) for Physical Exam.    Andrae Burns MD  [facility] is a 71 year old presenting for the following health issues:  Recheck Medication      HPI     Hypertension Follow-up      Do you check your blood pressure regularly outside of the clinic? Yes     Are you following a low salt diet? Yes    Are your blood pressures ever more than 140 on the top number (systolic) OR more   than 90 on the bottom number (diastolic), for example 140/90? Yes    LE edema follow-up; was on diuretic but had dizziness and patient stopped ,edema much better today, patient has varicose veins bilateral lower extremity but more in left lower extremity.      Past Medical History:   Diagnosis Date     Arthritis      Disorder of bone and cartilage, unspecified      Esophageal reflux      Osteopenia 6/14    -1.5       Current Outpatient Medications   Medication Sig Dispense Refill     fluticasone (FLONASE) 50 MCG/ACT nasal spray Spray 1 spray into both nostrils daily       IBUPROFEN 200 MG OR TABS 2 TABLET EVERY 4 TO 6 HOURS AS NEEDED for headaches        lisinopril (ZESTRIL) 10 MG tablet Take 1 tablet (10 mg) by mouth daily 90 tablet 0      "loratadine (CLARITIN) 10 MG tablet Take 10 mg by mouth daily       Melatonin 10 MG TABS tablet Take 10 mg by mouth nightly as needed for sleep       nitroGLYcerin (NITROSTAT) 0.4 MG sublingual tablet Place 1 tablet (0.4 mg) under the tongue every 5 minutes as needed for chest pain If you are still having symptoms after 3 doses (15 minutes) call 911. 25 tablet 0     OMEPRAZOLE PO PRN       polyethylene glycol (MIRALAX/GLYCOLAX) powder Take 1 capful by mouth as needed for constipation       Propylene Glycol (SYSTANE COMPLETE OP)        RESTASIS 0.05 % ophthalmic emulsion INSTILL ONE DROP IN EACH EYE TWO TIMES A DAY  3     tiZANidine (ZANAFLEX) 4 MG tablet Take 1 tablet (4 mg) by mouth nightly as needed for muscle spasms 30 tablet 0         Review of Systems   CONSTITUTIONAL: NEGATIVE for fever, chills, change in weight  RESP: NEGATIVE for significant cough or SOB  CV: NEGATIVE for chest pain, palpitations or peripheral edema  MUSCULOSKELETAL: Varicose veins /edema        Objective    /70   Pulse 104   Temp 97.9  F (36.6  C) (Tympanic)   Resp 14   Ht 1.651 m (5' 5\")   Wt 74.5 kg (164 lb 4.8 oz)   SpO2 97%   BMI 27.34 kg/m    Body mass index is 27.34 kg/m .  Physical Exam   GENERAL: healthy, alert and no distress  RESP: lungs clear to auscultation - no rales, rhonchi or wheezes  CV: regular rate and rhythm, normal S1 S2,    MS: Bilateral lower extremity varicose veins noted more on the left lower extremity, trace lower extremity edema noted, no calf tenderness bilaterally   NEURO: Normal strength and tone, mentation intact and speech normal                 .  ..  "

## 2022-08-03 ENCOUNTER — THERAPY VISIT (OUTPATIENT)
Dept: PHYSICAL THERAPY | Facility: CLINIC | Age: 72
End: 2022-08-03
Payer: COMMERCIAL

## 2022-08-03 DIAGNOSIS — M54.50 LUMBAGO: Primary | ICD-10-CM

## 2022-08-03 PROCEDURE — 97110 THERAPEUTIC EXERCISES: CPT | Mod: GP

## 2022-08-03 PROCEDURE — 97530 THERAPEUTIC ACTIVITIES: CPT | Mod: GP

## 2022-08-05 ENCOUNTER — TRANSFERRED RECORDS (OUTPATIENT)
Dept: HEALTH INFORMATION MANAGEMENT | Facility: CLINIC | Age: 72
End: 2022-08-05

## 2022-08-17 ENCOUNTER — THERAPY VISIT (OUTPATIENT)
Dept: PHYSICAL THERAPY | Facility: CLINIC | Age: 72
End: 2022-08-17
Payer: COMMERCIAL

## 2022-08-17 DIAGNOSIS — M54.50 LUMBAGO: Primary | ICD-10-CM

## 2022-08-17 PROCEDURE — 97110 THERAPEUTIC EXERCISES: CPT | Mod: GP

## 2022-08-24 ENCOUNTER — MYC MEDICAL ADVICE (OUTPATIENT)
Dept: INTERNAL MEDICINE | Facility: CLINIC | Age: 72
End: 2022-08-24

## 2022-08-24 DIAGNOSIS — R05.9 COUGH: Primary | ICD-10-CM

## 2022-08-24 RX ORDER — ALBUTEROL SULFATE 90 UG/1
2 AEROSOL, METERED RESPIRATORY (INHALATION) EVERY 6 HOURS
Qty: 18 G | Refills: 0 | Status: SHIPPED | OUTPATIENT
Start: 2022-08-24 | End: 2023-01-31

## 2022-09-14 ASSESSMENT — ENCOUNTER SYMPTOMS
PARESTHESIAS: 0
COUGH: 1
HEARTBURN: 0
ARTHRALGIAS: 0
CHILLS: 0
HEMATOCHEZIA: 0
SHORTNESS OF BREATH: 0
NERVOUS/ANXIOUS: 0
PALPITATIONS: 0
WEAKNESS: 1
CONSTIPATION: 1
HEMATURIA: 0
ABDOMINAL PAIN: 0
EYE PAIN: 0
FEVER: 0
DIARRHEA: 1
DYSURIA: 1
HEADACHES: 0
DIZZINESS: 0
BREAST MASS: 0
SORE THROAT: 0
FREQUENCY: 1

## 2022-09-14 ASSESSMENT — ACTIVITIES OF DAILY LIVING (ADL): CURRENT_FUNCTION: NO ASSISTANCE NEEDED

## 2022-09-19 ENCOUNTER — TELEPHONE (OUTPATIENT)
Dept: INTERNAL MEDICINE | Facility: CLINIC | Age: 72
End: 2022-09-19

## 2022-09-19 DIAGNOSIS — R30.0 DYSURIA: Primary | ICD-10-CM

## 2022-09-19 NOTE — TELEPHONE ENCOUNTER
S-(situation): urinary frequency, dysuria, aching in urethra at end of urination.  Patient knows primary care provider not in office until Wed. and she wants message held until then for primary care provider to address as she lives in [place] and won't drive up to [place] before then or be seen elsewhere sooner.     B-(background): Patient has appointment with primary care provider this Wed. 9/21, she wants UA ordered so that she can stop in lab to collect it right away when she arrives. Patient does not have a history of UTIs. States she does have a history of IBS and sometimes feels as if she has urinary urgency related to this.      A-(assessment): Patient with 2 week history of urinary frequency, dysuria, dull ache at end of urination. At times can only urinate a few drops.  Vagina feels raw, no vaginal discharge.  She denies fever, flank pain or visible blood in urine.     R-(recommendations): Advised patient she should be seen for symptoms.  She feels she can wait until office visit Wed. This week to have urine collected, not interested in being seen in urgent care.  Just wants orders entered for UA/UC.  KIYA Lopez R.N.

## 2022-09-19 NOTE — TELEPHONE ENCOUNTER
Reason for Call: Request for an order or referral:    Order or referral being requested: order    Date needed: as soon as possible    Has the patient been seen by the PCP for this problem? NO    Additional comments: patient says she thinks she has a possible UTI and if Dr. Burns can order her a urine culture test. She says that she knows she will be rushing in to use the bathroom as soon as she gets in and is wondering if the  can provide her a cup as she will not be able to hold her bladder in time for when the appt starts. Please give her a call back if it is possible to do this.     Phone number Patient can be reached at:  Home number on file [phone] (home)    Best Time:  Any time throughout the day    Can we leave a detailed message on this number?  YES    Call taken on 9/19/2022 at 12:02 PM by Kody Banegas

## 2022-09-21 ENCOUNTER — HOSPITAL ENCOUNTER (OUTPATIENT)
Dept: MAMMOGRAPHY | Facility: CLINIC | Age: 72
Discharge: HOME | End: 2022-09-21
Attending: INTERNAL MEDICINE | Admitting: INTERNAL MEDICINE
Payer: COMMERCIAL

## 2022-09-21 ENCOUNTER — OFFICE VISIT (OUTPATIENT)
Dept: INTERNAL MEDICINE | Facility: CLINIC | Age: 72
End: 2022-09-21
Payer: COMMERCIAL

## 2022-09-21 VITALS
TEMPERATURE: 97.9 F | RESPIRATION RATE: 11 BRPM | WEIGHT: 161.5 LBS | SYSTOLIC BLOOD PRESSURE: 126 MMHG | HEART RATE: 104 BPM | BODY MASS INDEX: 26.91 KG/M2 | DIASTOLIC BLOOD PRESSURE: 84 MMHG | HEIGHT: 65 IN | OXYGEN SATURATION: 100 %

## 2022-09-21 DIAGNOSIS — Z12.31 VISIT FOR SCREENING MAMMOGRAM: ICD-10-CM

## 2022-09-21 DIAGNOSIS — R10.2 VULVOVAGINAL PAIN: ICD-10-CM

## 2022-09-21 DIAGNOSIS — I10 ESSENTIAL HYPERTENSION: ICD-10-CM

## 2022-09-21 DIAGNOSIS — Z00.00 ENCOUNTER FOR MEDICARE ANNUAL WELLNESS EXAM: ICD-10-CM

## 2022-09-21 DIAGNOSIS — N39.0 URINARY TRACT INFECTION WITH HEMATURIA, SITE UNSPECIFIED: Primary | ICD-10-CM

## 2022-09-21 DIAGNOSIS — R31.9 URINARY TRACT INFECTION WITH HEMATURIA, SITE UNSPECIFIED: Primary | ICD-10-CM

## 2022-09-21 LAB
ALBUMIN UR-MCNC: NEGATIVE MG/DL
APPEARANCE UR: ABNORMAL
BACTERIA #/AREA URNS HPF: ABNORMAL /HPF
BILIRUB UR QL STRIP: NEGATIVE
COLOR UR AUTO: YELLOW
GLUCOSE UR STRIP-MCNC: NEGATIVE MG/DL
HGB BLD-MCNC: 14.3 G/DL (ref 11.7–15.7)
HGB UR QL STRIP: ABNORMAL
KETONES UR STRIP-MCNC: NEGATIVE MG/DL
LEUKOCYTE ESTERASE UR QL STRIP: ABNORMAL
NITRATE UR QL: NEGATIVE
PH UR STRIP: 6.5 [PH] (ref 5–7)
RBC #/AREA URNS AUTO: ABNORMAL /HPF
SP GR UR STRIP: 1.01 (ref 1–1.03)
SQUAMOUS #/AREA URNS AUTO: ABNORMAL /LPF
UROBILINOGEN UR STRIP-ACNC: 0.2 E.U./DL
WBC #/AREA URNS AUTO: ABNORMAL /HPF
WBC CLUMPS #/AREA URNS HPF: PRESENT /HPF

## 2022-09-21 PROCEDURE — 99214 OFFICE O/P EST MOD 30 MIN: CPT | Mod: 25 | Performed by: INTERNAL MEDICINE

## 2022-09-21 PROCEDURE — G0439 PPPS, SUBSEQ VISIT: HCPCS | Performed by: INTERNAL MEDICINE

## 2022-09-21 PROCEDURE — 87088 URINE BACTERIA CULTURE: CPT | Performed by: INTERNAL MEDICINE

## 2022-09-21 PROCEDURE — 84443 ASSAY THYROID STIM HORMONE: CPT | Performed by: INTERNAL MEDICINE

## 2022-09-21 PROCEDURE — 87086 URINE CULTURE/COLONY COUNT: CPT | Performed by: INTERNAL MEDICINE

## 2022-09-21 PROCEDURE — 85018 HEMOGLOBIN: CPT | Performed by: INTERNAL MEDICINE

## 2022-09-21 PROCEDURE — 80053 COMPREHEN METABOLIC PANEL: CPT | Performed by: INTERNAL MEDICINE

## 2022-09-21 PROCEDURE — 77067 SCR MAMMO BI INCL CAD: CPT

## 2022-09-21 PROCEDURE — 36415 COLL VENOUS BLD VENIPUNCTURE: CPT | Performed by: INTERNAL MEDICINE

## 2022-09-21 PROCEDURE — 81001 URINALYSIS AUTO W/SCOPE: CPT | Performed by: INTERNAL MEDICINE

## 2022-09-21 PROCEDURE — 80061 LIPID PANEL: CPT | Performed by: INTERNAL MEDICINE

## 2022-09-21 PROCEDURE — 87186 SC STD MICRODIL/AGAR DIL: CPT | Performed by: INTERNAL MEDICINE

## 2022-09-21 RX ORDER — LISINOPRIL 10 MG/1
10 TABLET ORAL DAILY
Qty: 90 TABLET | Refills: 1 | Status: SHIPPED | OUTPATIENT
Start: 2022-09-21 | End: 2023-03-16

## 2022-09-21 RX ORDER — NITROFURANTOIN 25; 75 MG/1; MG/1
100 CAPSULE ORAL 2 TIMES DAILY
Qty: 14 CAPSULE | Refills: 0 | Status: SHIPPED | OUTPATIENT
Start: 2022-09-21 | End: 2022-09-28

## 2022-09-21 ASSESSMENT — ENCOUNTER SYMPTOMS
HEMATURIA: 0
FEVER: 0
CONSTIPATION: 1
CHILLS: 0
HEMATOCHEZIA: 0
COUGH: 1
ROS GI COMMENTS: HISTORY OF IBS
DIARRHEA: 1
ARTHRALGIAS: 0
DIZZINESS: 0
HEADACHES: 0
FREQUENCY: 1
DYSURIA: 1
ABDOMINAL PAIN: 0
NERVOUS/ANXIOUS: 0
BREAST MASS: 0
EYE PAIN: 0
HEARTBURN: 0
PARESTHESIAS: 0
PALPITATIONS: 0
SHORTNESS OF BREATH: 0
SORE THROAT: 0

## 2022-09-21 ASSESSMENT — ACTIVITIES OF DAILY LIVING (ADL): CURRENT_FUNCTION: NO ASSISTANCE NEEDED

## 2022-09-21 NOTE — NURSING NOTE
"/84   Pulse 104   Temp 97.9  F (36.6  C) (Tympanic)   Resp 11   Ht 1.651 m (5' 5\")   Wt 73.3 kg (161 lb 8 oz)   SpO2 100%   BMI 26.88 kg/m    Patient in for Wellness Visit.  "

## 2022-09-21 NOTE — PROGRESS NOTES
"SUBJECTIVE:   Esha is a 72 year old who presents for Preventive Visit.      Patient has been advised of split billing requirements and indicates understanding: Yes  Are you in the first 12 months of your Medicare coverage?  No    h    Healthy Habits:     In general, how would you rate your overall health?  Good    Frequency of exercise:  1 day/week    Duration of exercise:  Less than 15 minutes    Do you usually eat at least 4 servings of fruit and vegetables a day, include whole grains    & fiber and avoid regularly eating high fat or \"junk\" foods?  No    Taking medications regularly:  Yes    Medication side effects:  None    Ability to successfully perform activities of daily living:  No assistance needed    Home Safety:  Lack of grab bars in the bathroom    Hearing Impairment:  No hearing concerns    In the past 6 months, have you been bothered by leaking of urine? Yes    In general, how would you rate your overall mental or emotional health?  Good      PHQ-2 Total Score: 0    Additional concerns today:  Yes    Do you feel safe in your environment? Yes    Have you ever done Advance Care Planning? (For example, a Health Directive, POLST, or a discussion with a medical provider or your loved ones about your wishes): No, advance care planning information given to patient to review.  Advanced care planning was discussed at today's visit.       Fall risk  Fallen 2 or more times in the past year?: No  Any fall with injury in the past year?: No    Cognitive Screening   1) Repeat 3 items (Leader, Season, Table)    2) Clock draw: NORMAL  3) 3 item recall: Recalls 3 objects  Results: 3 items recalled: COGNITIVE IMPAIRMENT LESS LIKELY    Mini-CogTM Copyright DENISE Flores. Licensed by the author for use in [facility]; reprinted with permission (chuckie@.[facility]). All rights reserved.      Do you have sleep apnea, excessive snoring or daytime drowsiness?: no    Reviewed and updated as needed this visit by clinical " · Venous duplex of lower extremities negative  · VQ scan pending although doubt PE with negative ultrasound staff                    Reviewed and updated as needed this visit by Provider                     Past Medical History:   Diagnosis Date     Arthritis      Disorder of bone and cartilage, unspecified      Esophageal reflux      Essential hypertension 7/26/2022     Osteopenia 6/14    -1.5       Past Surgical History:   Procedure Laterality Date     ABDOMEN SURGERY       BLADDER SURGERY       COLONOSCOPY       COLONOSCOPY N/A 10/16/2020    Procedure: COLONOSCOPY with random colon biopsies;  Surgeon: Louie Patiño MD;  Location:  GI     CYSTOSCOPY       GENITOURINARY SURGERY       HC REMOVAL GALLBLADDER       HC REMOVE TONSILS/ADENOIDS,<13 Y/O       ORTHOPEDIC SURGERY      wrist 2017 [facility]      VASCULAR SURGERY       ZZC APPENDECTOMY       ZZC LIGATE FALLOPIAN TUBE       ZZC NONSPECIFIC PROCEDURE      vein stripping x 3     ZZC TOTAL ABDOM HYSTERECTOMY      for fibroids with BSO       Current Outpatient Medications   Medication Sig Dispense Refill     albuterol (PROAIR HFA/PROVENTIL HFA/VENTOLIN HFA) 108 (90 Base) MCG/ACT inhaler Inhale 2 puffs into the lungs every 6 hours 18 g 0     fluticasone (FLONASE) 50 MCG/ACT nasal spray Spray 1 spray into both nostrils daily       IBUPROFEN 200 MG OR TABS 2 TABLET EVERY 4 TO 6 HOURS AS NEEDED for headaches        lisinopril (ZESTRIL) 10 MG tablet Take 1 tablet (10 mg) by mouth daily 90 tablet 0     loratadine (CLARITIN) 10 MG tablet Take 10 mg by mouth daily       Melatonin 10 MG TABS tablet Take 10 mg by mouth nightly as needed for sleep       nitroGLYcerin (NITROSTAT) 0.4 MG sublingual tablet Place 1 tablet (0.4 mg) under the tongue every 5 minutes as needed for chest pain If you are still having symptoms after 3 doses (15 minutes) call 911. 25 tablet 0     OMEPRAZOLE PO PRN       polyethylene glycol (MIRALAX/GLYCOLAX) powder Take 1 capful by mouth as needed for constipation       Propylene Glycol (SYSTANE COMPLETE OP)        RESTASIS 0.05 % ophthalmic emulsion  INSTILL ONE DROP IN EACH EYE TWO TIMES A DAY  3     tiZANidine (ZANAFLEX) 4 MG tablet Take 1 tablet (4 mg) by mouth nightly as needed for muscle spasms 30 tablet 0       Family History   Problem Relation Age of Onset     Diabetes Mother         born 1928     Hypertension Mother      Obesity Mother      Cancer Paternal Grandfather         skin     Colon Cancer Maternal Grandfather         had in his late 80s     Substance Abuse Brother      Osteoporosis Paternal Grandmother        Social History     Tobacco Use     Smoking status: Never Smoker     Smokeless tobacco: Never Used   Substance Use Topics     Alcohol use: No     Alcohol/week: 0.0 standard drinks     If you drink alcohol do you typically have >3 drinks per day or >7 drinks per week? No    Alcohol Use 9/14/2022   Prescreen: >3 drinks/day or >7 drinks/week? Not Applicable   Prescreen: >3 drinks/day or >7 drinks/week? -   No flowsheet data found.        Hypertension Follow-up      Do you check your blood pressure regularly outside of the clinic? Yes     Are you following a low salt diet? Yes    Are your blood pressures ever more than 140 on the top number (systolic) OR more   than 90 on the bottom number (diastolic), for example 140/90? No       Pt also complains of having dysuria/frequency of urination since 2 weeks, no blood in urine, no fever/chills    Pt also c/o burnign pain in vulvar/clitoril and vaginal area since few months.       Current providers sharing in care for this patient include:   Patient Care Team:  Andrae Burns MD as PCP - General (Internal Medicine)  Andrae Burns MD as Assigned PCP  Olya Benites PA-C as Assigned Surgical Provider    The following health maintenance items are reviewed in Epic and correct as of today:  Health Maintenance   Topic Date Due     COVID-19 Vaccine (3 - Booster for Pfizer series) 08/10/2021     INFLUENZA VACCINE (1) 09/01/2022     MEDICARE ANNUAL WELLNESS VISIT  09/13/2022  "    MAMMO SCREENING  09/13/2022     ANNUAL REVIEW OF HM ORDERS  06/29/2023     FALL RISK ASSESSMENT  09/21/2023     DTAP/TDAP/TD IMMUNIZATION (5 - Td or Tdap) 07/05/2026     LIPID  09/13/2026     ADVANCE CARE PLANNING  09/13/2026     COLORECTAL CANCER SCREENING  10/16/2030     DEXA  10/08/2035     HEPATITIS C SCREENING  Completed     PHQ-2 (once per calendar year)  Completed     Pneumococcal Vaccine: 65+ Years  Completed     ZOSTER IMMUNIZATION  Completed     IPV IMMUNIZATION  Aged Out     MENINGITIS IMMUNIZATION  Aged Out     HEPATITIS B IMMUNIZATION  Aged Out        Review of Systems   Constitutional: Negative for chills and fever.   HENT: Negative for congestion, ear pain, hearing loss and sore throat.    Eyes: Negative for pain and visual disturbance.   Respiratory: Positive for cough. Negative for shortness of breath.         Recovering from COVID 4 weeks ago   Cardiovascular: Negative for chest pain and palpitations.   Gastrointestinal: Positive for constipation and diarrhea. Negative for abdominal pain, heartburn and hematochezia.        History of IBS   Breasts:  Negative for tenderness, breast mass and discharge.   Genitourinary: Positive for dysuria and frequency. Negative for genital sores, hematuria, pelvic pain, urgency, vaginal bleeding and vaginal discharge.   Musculoskeletal: Negative for arthralgias.   Skin: Negative for rash.   Neurological: Negative for dizziness, headaches and paresthesias.   Psychiatric/Behavioral: The patient is not nervous/anxious.         OBJECTIVE:   /84   Pulse 104   Temp 97.9  F (36.6  C) (Tympanic)   Resp 11   Ht 1.651 m (5' 5\")   Wt 73.3 kg (161 lb 8 oz)   SpO2 100%   BMI 26.88 kg/m   Estimated body mass index is 26.88 kg/m  as calculated from the following:    Height as of this encounter: 1.651 m (5' 5\").    Weight as of this encounter: 73.3 kg (161 lb 8 oz).  Physical Exam  GENERAL APPEARANCE: healthy, alert and no distress  EYES: Eyes grossly normal to " inspection, PERRL and conjunctivae and sclerae normal  RESP: lungs clear to auscultation - no rales, rhonchi or wheezes  BREAST: normal without masses, tenderness or nipple discharge and no palpable axillary masses or adenopathy  CV: regular rate and rhythm, normal S1 S2,   ABDOMEN: soft, nontender,  and bowel sounds normal   (female): normal female external genitalia, could not do pelvic exam/could not separate the labia majora to examine the clitoris, urethra vagina  as patient was in a lot of pain.  MS: no musculoskeletal defects are noted and gait is age appropriate without ataxia  NEURO: Normal strength and tone, sensory exam grossly normal, mentation intact and speech normal  PSYCH: mentation appears normal and affect normal/bright    Diagnostic Test Results:  Labs reviewed in Epic  Urinalysis -urine WBC 25-50    ASSESSMENT / PLAN:     (Z00.00) Encounter for Medicare annual wellness exam  Plan: Comprehensive metabolic panel, Lipid panel         reflex to direct LDL Fasting, Hemoglobin, TSH         with free T4 reflex            (N39.0,  R31.9) Urinary tract infection with hematuria, site unspecified  (primary encounter diagnosis)  Plan: Urine Microscopic, Urine Culture, started on nitroFURantoin macrocrystal-monohydrate         (MACROBID) 100 MG capsule as directed.explained clearly about the medication,insructions and side effects.  Await urine culture results          (I10) Essential hypertension  Plan: Blood pressure well controlled, continue lisinopril 10 mg daily refilled as directed.explained clearly about the medication,insructions and side effects.      (R10.2) Vulvovaginal pain  Plan: Patient has seen OB/GYN Dr. Bertrand in the past and appointment made with Dr. Bertrand in 1 wk.       Patient has been advised of split billing requirements and indicates understanding: Yes    COUNSELING:  Reviewed preventive health counseling, as reflected in patient instructions       Regular exercise       Healthy  "diet/nutrition       Immunizations  Declined: Influenza at this time      Estimated body mass index is 26.88 kg/m  as calculated from the following:    Height as of this encounter: 1.651 m (5' 5\").    Weight as of this encounter: 73.3 kg (161 lb 8 oz).        She reports that she has never smoked. She has never used smokeless tobacco.      Appropriate preventive services were discussed with this patient, including applicable screening as appropriate for cardiovascular disease, diabetes, osteopenia/osteoporosis, and glaucoma.  As appropriate for age/gender, discussed screening for colorectal cancer, prostate cancer, breast cancer, and cervical cancer. Checklist reviewing preventive services available has been given to the patient.    Reviewed patients plan of care and provided an AVS. The Basic Care Plan (routine screening as documented in Health Maintenance) for Nita meets the Care Plan requirement. This Care Plan has been established and reviewed with the Patient.    Counseling Resources:  ATP IV Guidelines  Pooled Cohorts Equation Calculator  Breast Cancer Risk Calculator  Breast Cancer: Medication to Reduce Risk  FRAX Risk Assessment  ICSI Preventive Guidelines  Dietary Guidelines for Americans, 2010  USDA's MyPlate  ASA Prophylaxis  Lung CA Screening    Andrae Burns MD  [facility]    Identified Health Risks:  "

## 2022-09-22 LAB
ALBUMIN SERPL-MCNC: 3.6 G/DL (ref 3.4–5)
ALP SERPL-CCNC: 78 U/L (ref 40–150)
ALT SERPL W P-5'-P-CCNC: 46 U/L (ref 0–50)
ANION GAP SERPL CALCULATED.3IONS-SCNC: 8 MMOL/L (ref 3–14)
AST SERPL W P-5'-P-CCNC: 21 U/L (ref 0–45)
BACTERIA UR CULT: ABNORMAL
BILIRUB SERPL-MCNC: 0.3 MG/DL (ref 0.2–1.3)
BUN SERPL-MCNC: 14 MG/DL (ref 7–30)
CALCIUM SERPL-MCNC: 8.9 MG/DL (ref 8.5–10.1)
CHLORIDE BLD-SCNC: 107 MMOL/L (ref 94–109)
CHOLEST SERPL-MCNC: 188 MG/DL
CO2 SERPL-SCNC: 26 MMOL/L (ref 20–32)
CREAT SERPL-MCNC: 0.78 MG/DL (ref 0.52–1.04)
FASTING STATUS PATIENT QL REPORTED: YES
GFR SERPL CREATININE-BSD FRML MDRD: 81 ML/MIN/1.73M2
GLUCOSE BLD-MCNC: 94 MG/DL (ref 70–99)
HDLC SERPL-MCNC: 56 MG/DL
LDLC SERPL CALC-MCNC: 112 MG/DL
NONHDLC SERPL-MCNC: 132 MG/DL
POTASSIUM BLD-SCNC: 4 MMOL/L (ref 3.4–5.3)
PROT SERPL-MCNC: 7.1 G/DL (ref 6.8–8.8)
SODIUM SERPL-SCNC: 141 MMOL/L (ref 133–144)
TRIGL SERPL-MCNC: 99 MG/DL
TSH SERPL DL<=0.005 MIU/L-ACNC: 0.82 MU/L (ref 0.4–4)

## 2022-09-26 ENCOUNTER — DOCUMENTATION ONLY (OUTPATIENT)
Dept: LAB | Facility: CLINIC | Age: 72
End: 2022-09-26

## 2022-09-26 ENCOUNTER — LAB (OUTPATIENT)
Dept: LAB | Facility: CLINIC | Age: 72
End: 2022-09-26
Payer: COMMERCIAL

## 2022-09-26 DIAGNOSIS — R31.9 URINARY TRACT INFECTION WITH HEMATURIA, SITE UNSPECIFIED: ICD-10-CM

## 2022-09-26 DIAGNOSIS — R31.9 HEMATURIA: Primary | ICD-10-CM

## 2022-09-26 DIAGNOSIS — N39.0 URINARY TRACT INFECTION WITH HEMATURIA, SITE UNSPECIFIED: Primary | ICD-10-CM

## 2022-09-26 DIAGNOSIS — R31.9 URINARY TRACT INFECTION WITH HEMATURIA, SITE UNSPECIFIED: Primary | ICD-10-CM

## 2022-09-26 DIAGNOSIS — N39.0 URINARY TRACT INFECTION WITH HEMATURIA, SITE UNSPECIFIED: ICD-10-CM

## 2022-09-26 LAB
ALBUMIN UR-MCNC: NEGATIVE MG/DL
APPEARANCE UR: ABNORMAL
BACTERIA #/AREA URNS HPF: ABNORMAL /HPF
BILIRUB UR QL STRIP: NEGATIVE
COLOR UR AUTO: YELLOW
GLUCOSE UR STRIP-MCNC: NEGATIVE MG/DL
HGB UR QL STRIP: ABNORMAL
HOLD SPECIMEN: NORMAL
KETONES UR STRIP-MCNC: NEGATIVE MG/DL
LEUKOCYTE ESTERASE UR QL STRIP: NEGATIVE
NITRATE UR QL: NEGATIVE
PH UR STRIP: 5.5 [PH] (ref 5–7)
RBC #/AREA URNS AUTO: ABNORMAL /HPF
SP GR UR STRIP: 1.02 (ref 1–1.03)
UROBILINOGEN UR STRIP-ACNC: 0.2 E.U./DL
WBC #/AREA URNS AUTO: ABNORMAL /HPF

## 2022-09-26 PROCEDURE — 81001 URINALYSIS AUTO W/SCOPE: CPT

## 2022-09-26 NOTE — PROGRESS NOTES
Patient states instructed to come back for urine, no orders. Had patient leave a urine, will hold for 24 hours. Please place orders asap. Thank you.

## 2022-09-28 ENCOUNTER — OFFICE VISIT (OUTPATIENT)
Dept: OBGYN | Facility: CLINIC | Age: 72
End: 2022-09-28
Payer: COMMERCIAL

## 2022-09-28 VITALS
DIASTOLIC BLOOD PRESSURE: 70 MMHG | BODY MASS INDEX: 26.82 KG/M2 | WEIGHT: 161 LBS | SYSTOLIC BLOOD PRESSURE: 112 MMHG | HEIGHT: 65 IN

## 2022-09-28 DIAGNOSIS — N76.0 VAGINITIS AND VULVOVAGINITIS: ICD-10-CM

## 2022-09-28 DIAGNOSIS — N95.2 ATROPHIC VAGINITIS: ICD-10-CM

## 2022-09-28 DIAGNOSIS — R10.2 VAGINAL PAIN: Primary | ICD-10-CM

## 2022-09-28 DIAGNOSIS — N89.8 VAGINAL DISCHARGE: ICD-10-CM

## 2022-09-28 LAB
CLUE CELLS: ABNORMAL
TRICHOMONAS, WET PREP: ABNORMAL
WBC'S/HIGH POWER FIELD, WET PREP: ABNORMAL
YEAST, WET PREP: ABNORMAL

## 2022-09-28 PROCEDURE — 87210 SMEAR WET MOUNT SALINE/INK: CPT | Performed by: OBSTETRICS & GYNECOLOGY

## 2022-09-28 PROCEDURE — 99214 OFFICE O/P EST MOD 30 MIN: CPT | Performed by: OBSTETRICS & GYNECOLOGY

## 2022-09-28 NOTE — NURSING NOTE
"Chief Complaint   Patient presents with     Vaginal Problem     Raw, itching and lots of pain       Initial /70 (BP Location: Right arm, Patient Position: Chair, Cuff Size: Adult Regular)   Ht 1.651 m (5' 5\")   Wt 73 kg (161 lb)   Breastfeeding No   BMI 26.79 kg/m   Estimated body mass index is 26.79 kg/m  as calculated from the following:    Height as of this encounter: 1.651 m (5' 5\").    Weight as of this encounter: 73 kg (161 lb).  BP completed using cuff size: regular    Questioned patient about current smoking habits.  Pt. has never smoked.          The following HM Due: NONE    Justa Arana CMA    "

## 2022-10-01 NOTE — PROGRESS NOTES
"  SUBJECTIVE:                                                   CC:  Patient presents with:  Vaginal Problem: Raw, itching and lots of pain      HPI:  Nita Trevizo is a 72 year old , a previous patient, who presents with vaginal pain.    Pain has been present x2 months, worsening over the last two weeks.  It is a sharp pain at the top, associated with itching and burning.  She has tried aquaphor, desitin, vaseline, and cortisone 10.  No intercourse for years, her  takes medications which prevent this.    Uses poise pads on a daily basis, has some positional bladder leakage which she saw urology for and they said they wouldn't recommend a sling, recommended double voiding and doing position changes after urination to completely empty.  She recently was diagnosed with a bladder infection and was treated with macrobid, near to when all of this pain started.  She wears a poise pad daily, when having the UTI was wearing one pad for the entire day, not changing it very often.   She previously had an area on the vulva which was irritated and biopsy was negative for LS.  The diagnosis was only chronic mild inflammation, nonspecific.       Gyn History:  No LMP recorded. Patient has had a hysterectomy.       PMH, PSH, Soc Hx, Fam Hx, Meds, and allergies reviewed in Epic.    OBJECTIVE:     /70 (BP Location: Right arm, Patient Position: Chair, Cuff Size: Adult Regular)   Ht 1.651 m (5' 5\")   Wt 73 kg (161 lb)   Breastfeeding No   BMI 26.79 kg/m      Gen: Healthy appearing female, no acute distress, comfortable, appears younger than stated age  Psych: mentation appears normal and affect bright  : Vulva is atrophic c/w menopausal status.  The vagina is very tender, difficulty in examining even with one finger.  Skin is mildly erythematous and there is a thin discharge.  No excoriations or open lesions, no ulcerations or fissures.     Test Results:  10/2019  SPECIMEN(S):   Vulva biopsy "     FINAL DIAGNOSIS:   Vulva, biopsy:   - Hyperkeratosis.   - Chronic inflammation, mild, nonspecific.   - Negative for lichen sclerosus, melanocytic proliferation, squamous   dysplasia and malignancy.     ASSESSMENT/PLAN:                                                      1. Vaginal pain/vaginitis, ddx contact dermatitis, atrophic vaginitis, lichen limplex chronicus, yeast or bacterial vaginosis  Will start by ruling out vaginitis infection, which would make sense as she recently was treated with antibiotics.  Reviewed excellent vulvar hygiene. Recommend stopping all topical medications or preparations, as well as trying to keep the vulva free of contact with a pantiliner for as much of the day as possible.  RTC 2-4 wks, and if neg for infx and symptoms unimproved after vulvar hygiene, consider steroids or repeat biopsy.   - Wet prep - Clinic Collect    Fiona Bertrand MD, MPH  Obstetrics and Gynecology      Total time spent was 22 minutes; this includes pre-work time, intra-service time, and post-work time including time spent on documentation which occurred on the date of service.

## 2022-10-06 ENCOUNTER — TELEPHONE (OUTPATIENT)
Dept: INTERNAL MEDICINE | Facility: CLINIC | Age: 72
End: 2022-10-06

## 2022-10-06 DIAGNOSIS — R30.0 DYSURIA: Primary | ICD-10-CM

## 2022-10-06 RX ORDER — CEPHALEXIN 500 MG/1
500 CAPSULE ORAL 2 TIMES DAILY
Qty: 14 CAPSULE | Refills: 0 | Status: SHIPPED | OUTPATIENT
Start: 2022-10-06 | End: 2022-10-13

## 2022-10-06 NOTE — TELEPHONE ENCOUNTER
Patient finished her macrobid on 10/4/22 for a UTI.  [place] and her UTI symptoms have returned. She is wondering if she can get another rx.   She is allergic to cipro.  She did a urine recheck after she finished the macrobid and it still showed bacteria so she is hoping to try a different rx.  She cannot get any care out of state because her insurance will not allow her too since she will not be there for 30 days.

## 2022-10-06 NOTE — TELEPHONE ENCOUNTER
Please advise pt.       The bacteria on her most recent urine specimen from 9/26 was from specimen contamination and not from recurrent bladder infection.     The culture from the 9/21 urine test shows that cephalexin should have been effective. Will E-prescribed Rx for cephalexin to her pharmacy. Needs to see provider if symptoms persist or recur.

## 2022-10-25 PROBLEM — M54.50 LUMBAGO: Status: RESOLVED | Noted: 2022-07-18 | Resolved: 2022-10-25

## 2022-10-28 ENCOUNTER — OFFICE VISIT (OUTPATIENT)
Dept: OBGYN | Facility: CLINIC | Age: 72
End: 2022-10-28
Payer: COMMERCIAL

## 2022-10-28 VITALS — SYSTOLIC BLOOD PRESSURE: 100 MMHG | DIASTOLIC BLOOD PRESSURE: 60 MMHG

## 2022-10-28 DIAGNOSIS — N95.2 ATROPHIC VAGINITIS: Primary | ICD-10-CM

## 2022-10-28 DIAGNOSIS — L90.0 LICHEN SCLEROSUS: ICD-10-CM

## 2022-10-28 PROCEDURE — 99214 OFFICE O/P EST MOD 30 MIN: CPT | Performed by: OBSTETRICS & GYNECOLOGY

## 2022-10-28 RX ORDER — ESTRADIOL 0.1 MG/G
1 CREAM VAGINAL
Qty: 42.5 G | Refills: 1 | Status: SHIPPED | OUTPATIENT
Start: 2022-10-31 | End: 2023-04-22

## 2022-10-28 NOTE — PROGRESS NOTES
SUBJECTIVE:                                                   CC:  Patient presents with:  Vaginal Problem: Burning when urine hits skin. Also has bumps in groin      HPI:  Nita Trevizo is a 72 year old , a previous patient, who presents with ongoing vaginal pain.     The main issue is itching now, and the pain is primarily associated with urination certain times when it feels like it really burns on the outside.  Still using poise pads on a daily basis.      She previously had an area on the vulva which was irritated and biopsy was negative for LS.  The diagnosis was only chronic mild inflammation, nonspecific.     Gyn History:  No LMP recorded. Patient has had a hysterectomy.       PMH, PSH, Soc Hx, Fam Hx, Meds, and allergies reviewed in Epic.    OBJECTIVE:     /60 (BP Location: Right arm, Patient Position: Chair, Cuff Size: Adult Regular)   Breastfeeding No     Gen: Healthy appearing female, no acute distress, comfortable, appears younger than stated age  Psych: mentation appears normal and affect bright  : Vulva is atrophic c/w menopausal status. The vulva and vagina are significantly healed compared to prior visit in which there was a thin mucus discharge and acute pain.  No excoriations or open lesions, no ulcerations or fissures.  The skin appears sclerotic and thinned with an overall loss of architecture    Test Results:  10/2019  SPECIMEN(S):   Vulva biopsy     FINAL DIAGNOSIS:   Vulva, biopsy:   - Hyperkeratosis.   - Chronic inflammation, mild, nonspecific.   - Negative for lichen sclerosus, melanocytic proliferation, squamous   dysplasia and malignancy.     ASSESSMENT/PLAN:                                                      1. Vaginal pain/vaginitis, ddx contact dermatitis, atrophic vaginitis, lichen sclerosus, lichen limplex chronicus, yeast or bacterial vaginosis    Concern for LS - has clobetasol at home.  Again we discussed her symptoms in depth and the history and  physical seem the most consistent with lichen sclerosus.  She previously tried the clobetasol and it did improve her symptoms, but they recurred when she stopped the med.  We discussed that LS is relapsing remitting and often this is the common course it takes.  If her symptoms improve with clobetatol, I instructed in how to use it for LS. Would still consider a repeat bx for LS at her next visit if she is open to it, especially if the symptoms don't vastly improve with clobetasol.    - Consider vulvar bx  - At very minimum RTC in 1 year for a skin check    Fiona Bertrand MD, MPH  Obstetrics and Gynecology      Total time spent was 32 minutes; this includes pre-work time, intra-service time, and post-work time including time spent on documentation which occurred on the date of service.

## 2022-11-19 ENCOUNTER — HEALTH MAINTENANCE LETTER (OUTPATIENT)
Age: 72
End: 2022-11-19

## 2022-12-08 ENCOUNTER — MYC MEDICAL ADVICE (OUTPATIENT)
Dept: OBGYN | Facility: CLINIC | Age: 72
End: 2022-12-08

## 2022-12-08 DIAGNOSIS — L90.0 LICHEN SCLEROSUS: Primary | ICD-10-CM

## 2022-12-08 NOTE — TELEPHONE ENCOUNTER
Please see [facility] request for fill of clobetasol and instruction questions.  We have not ordered this for her previously so I cannot enter it and pend it below.    Deborah Alexandre RN

## 2022-12-09 RX ORDER — CLOBETASOL PROPIONATE 0.5 MG/G
OINTMENT TOPICAL
Qty: 45 G | Refills: 3 | Status: SHIPPED | OUTPATIENT
Start: 2022-12-09

## 2023-01-26 ENCOUNTER — TELEPHONE (OUTPATIENT)
Dept: OBGYN | Facility: CLINIC | Age: 73
End: 2023-01-26
Payer: COMMERCIAL

## 2023-01-26 NOTE — TELEPHONE ENCOUNTER
Pt calling.   She believes that she has a UTI.    C/o urinary frequency, urgency, dysuria and pressure x 4 days.    Pharmacy noted.     Pt is requesting tx.    Also routed to the md on-call today as pt would like treatment ASAP. She lives in [place] and her insurance does not cover her to go to urgent care there.    Also, pt states that Macrobid does not usually work for her.    I did suggest that she  Azo to help with the sx.    Dayanna HUERTAS RN

## 2023-01-26 NOTE — TELEPHONE ENCOUNTER
Please advise patient to leave a sample at her nearest lab, treatment will be based on results of UA/UC.     Destini Jones MD

## 2023-01-27 NOTE — TELEPHONE ENCOUNTER
Dr. Fernanda Bertrand, would you be willing to rx an antibiotic?  See the first message.  Pts insurance does not cover any medical facilities it the [facility].    Pt does not want macrobid, as it doesn't usually work.    Dayanna HUERTAS RN

## 2023-01-28 NOTE — TELEPHONE ENCOUNTER
Sorry to hear she is going through this.  If she can't leave the specimen at any lab in her town, I'd recommend driving to a [place] lab that's open on the weekends so we can check the urine culture before treating.  She can also use pyridium (AZO) for urinary tract pain in the meantime.  Looks like she also has an appt with us coming up early next week.

## 2023-01-30 NOTE — TELEPHONE ENCOUNTER
Spoke with pt.     She ended up going into urgent care at [facility] on 1/28/23.    She was dx with a UTI, and is currently on treatment.    Dayanna HUERTAS RN     I have reviewed and confirmed nurses' notes...

## 2023-01-31 ENCOUNTER — OFFICE VISIT (OUTPATIENT)
Dept: OBGYN | Facility: CLINIC | Age: 73
End: 2023-01-31
Payer: COMMERCIAL

## 2023-01-31 VITALS
BODY MASS INDEX: 25.49 KG/M2 | SYSTOLIC BLOOD PRESSURE: 112 MMHG | DIASTOLIC BLOOD PRESSURE: 68 MMHG | WEIGHT: 153 LBS | HEIGHT: 65 IN

## 2023-01-31 DIAGNOSIS — N95.2 ATROPHIC VAGINITIS: ICD-10-CM

## 2023-01-31 DIAGNOSIS — L90.0 LICHEN SCLEROSUS: Primary | ICD-10-CM

## 2023-01-31 PROCEDURE — 99214 OFFICE O/P EST MOD 30 MIN: CPT | Performed by: OBSTETRICS & GYNECOLOGY

## 2023-01-31 NOTE — NURSING NOTE
"Chief Complaint   Patient presents with     Consult     Patient previously seen by Dr. Bertrand for atrophic vaginitis and lichen sclerosus.        Initial /68   Ht 1.651 m (5' 5\")   Wt 69.4 kg (153 lb)   BMI 25.46 kg/m   Estimated body mass index is 25.46 kg/m  as calculated from the following:    Height as of this encounter: 1.651 m (5' 5\").    Weight as of this encounter: 69.4 kg (153 lb).  BP completed using cuff size: regular    Questioned patient about current smoking habits.  Pt. has never smoked.          The following HM Due: NONE      The following patient reported/Care Every where data was sent to:  P ABSTRACT QUALITY INITIATIVES [[id number]]  Marina Aburto LPN               "

## 2023-01-31 NOTE — PROGRESS NOTES
SUBJECTIVE:                                                     Nita Trevizo is a 72 year old female  who presents today for vulvar pain.  She has been seen by Dr. Fernanda baxter for this several times in addition, and I refer you to those notes for details.    To recap, she has a remote history of a vulvar biopsy several years ago which was reportedly negative for lichen sclerosis.  She has not had a recent biopsy.  This was suggested to her by Dr. Fernanda baxter but she was reticent to proceed because she remembers the prior biopsy is being uncomfortable.  She had signs on exam consistent with lichen sclerosis despite the history of a negative biopsy several years ago, so was started on clobetasol.  She admits that she does not use the clobetasol daily, but uses it as needed when she feels irritation.  Therefore, she sometimes uses it multiple times in a day and then may go several days without using it.  She was also prescribed Estrace cream and used that for about a week but did not find it to be helpful and so she stopped it.    Today, her primary symptoms are burning and occasional itching.  She does not have a lot of itching now though that was her first symptom.  She also has had some sharp pains in the periclitoral region, usually just with exam.  She is not sexually active.  She does note some urinary leakage and a recent UTI for which she is finishing 10 days of oral antibiotics.  Her urinary incontinence occurs without any urge or stress, and is typically leakage that happens right after she has gone to urinate when she stands up or then sits back down in a chair after the fact.  She uses a poise pad daily for this.      Problem list and histories reviewed & adjusted, as indicated.  Additional history: as documented.    Patient Active Problem List   Diagnosis     Chronic maxillary sinusitis     Esophageal reflux     Osteopenia     Dry eyes, bilateral     Irritable bowel syndrome with both  constipation and diarrhea     Varicose veins of both legs with edema     Essential hypertension     Past Surgical History:   Procedure Laterality Date     ABDOMEN SURGERY       BLADDER SURGERY       COLONOSCOPY       COLONOSCOPY N/A 10/16/2020    Procedure: COLONOSCOPY with random colon biopsies;  Surgeon: Louie Patiño MD;  Location:  GI     CYSTOSCOPY       GENITOURINARY SURGERY       HC REMOVAL GALLBLADDER       HC REMOVE TONSILS/ADENOIDS,<13 Y/O       ORTHOPEDIC SURGERY      wrist 2017 [facility]      VASCULAR SURGERY       ZZC APPENDECTOMY       ZZC LIGATE FALLOPIAN TUBE       ZZC NONSPECIFIC PROCEDURE      vein stripping x 3     ZZC TOTAL ABDOM HYSTERECTOMY      for fibroids with BSO      Social History     Tobacco Use     Smoking status: Never     Smokeless tobacco: Never   Substance Use Topics     Alcohol use: No     Alcohol/week: 0.0 standard drinks      Problem (# of Occurrences) Relation (Name,Age of Onset)    Substance Abuse (1) Brother (andrew)    Cancer (1) Paternal Grandfather: skin    Diabetes (1) Mother (Stefanie): born 1928    Hypertension (1) Mother (Stefanie)    Osteoporosis (1) Paternal Grandmother (alberto)    Obesity (1) Mother (Stefanie)    Colon Cancer (1) Maternal Grandfather (brit): had in his late 80s            clobetasol (TEMOVATE) 0.05 % external ointment, Apply topically.  Use daily during a flare, apply twice weekly between flares for prevention.  fluticasone (FLONASE) 50 MCG/ACT nasal spray, Spray 1 spray into both nostrils daily  IBUPROFEN 200 MG OR TABS, 2 TABLET EVERY 4 TO 6 HOURS AS NEEDED for headaches   lisinopril (ZESTRIL) 10 MG tablet, Take 1 tablet (10 mg) by mouth daily  Melatonin 10 MG TABS tablet, Take 10 mg by mouth nightly as needed for sleep  nitroGLYcerin (NITROSTAT) 0.4 MG sublingual tablet, Place 1 tablet (0.4 mg) under the tongue every 5 minutes as needed for chest pain If you are still having symptoms after 3 doses (15 minutes) call 911.  OMEPRAZOLE PO,  PRN  polyethylene glycol (MIRALAX/GLYCOLAX) powder, Take 1 capful by mouth as needed for constipation  Propylene Glycol (SYSTANE COMPLETE OP),   RESTASIS 0.05 % ophthalmic emulsion, INSTILL ONE DROP IN EACH EYE TWO TIMES A DAY  tiZANidine (ZANAFLEX) 4 MG tablet, Take 1 tablet (4 mg) by mouth nightly as needed for muscle spasms  estradiol (ESTRACE) 0.1 MG/GM vaginal cream, Place 1 g vaginally twice a week (Patient not taking: Reported on 1/31/2023)    No current facility-administered medications on file prior to visit.    Allergies   Allergen Reactions     Ciprofloxacin      Rash, eyelids red puffy & itchy     Codeine      Esophogeal spasms     Erythromycin        ROS:  Negative other than as noted in HPI.    OBJECTIVE:     Exam:  Constitutional:  Appearance: Well nourished, well developed alert, in no acute distress  Neurologic/Psychiatric:  Mental Status:  Oriented X3   Pelvis: EGBUS notable for significant architectural changes including retraction of the clitoral maynard, almost complete resorption of the labia minora bilaterally, obliteration of the interlabial folds, and significant agglutination around the introitus to the point that the urethra is actually not visible at all.  She does not have any ulcerations, signs of JANEE or vulvar cancer, or any signs consistent with infection.  There are no fissures noted.      In-Clinic Test Results:  No results found for this or any previous visit (from the past 24 hour(s)).    ASSESSMENT/PLAN:                                                        ICD-10-CM    1. Lichen sclerosus  L90.0       2. Atrophic vaginitis  N95.2             I do think she is likely suffering from both atrophic vaginitis and lichen sclerosis.  I am highly suspicious that her urinary leakage is actually urine pooling in the vagina when she urinates and then running back out at a later time, based on the fact that her urethra is not visible at all from the agglutination that has occurred at the  introitus.  She cannot insert any estrogen into the vagina due to pain and narrowing of the introitus, so for now we will start her using a small amount of Estrace daily applied just to the introitus digitally.  If she can get a little bit of that inside that will be good, but we will start with this.  We will have her use the clobetasol twice a day until she sees me back in 1 month.  For relief of her symptoms at other times, since the ointment does seem to work for that, we will have her do soak and seal, and those instructions were given.  She will need to see me back in a month.  We briefly discussed the use of vaginal dilators, which I think will be necessary for her after she has been adequately treated with estrogen.  It may be helpful for her to visit with the pelvic floor physical therapist to learn how to use these and to retrain the pelvic floor.  All instructions were added to the after visit summary.    Yamileth Gallegos MD  [facility] [employer]

## 2023-02-01 NOTE — PATIENT INSTRUCTIONS
"Instructions for after your visit:    -Apply thin layer of clobetasol ointment to the nonhairbearing areas of the vulva twice a day until your follow-up appointment.    -Use a pea-sized amount of Estrace cream and apply nightly to the opening of the vagina, and inside the vagina as much as you are able.  Continue this until I see you back for follow-up.    -For comfort in this area at other times : rinse skin off with plain water frequently.  Use tap water, distilled water, sitz baths, squirt bottles, or bidets.  Additionally, hand held showers can be helpful for rinsing the vulva.  After rinsing, pat the skin gently dry. It is okay to then apply a thin layer of plain petroleum jelly (Vasoline) if this helps with discomfort. This is often referred to as the \"soak and seal\" method.    "

## 2023-02-24 NOTE — PROGRESS NOTES
"SUBJECTIVE:   Nita Trevizo is a 69 year old female who presents for Preventive Visit.    Are you in the first 12 months of your Medicare coverage?  No    Healthy Habits:     In general, how would you rate your overall health?  Good    Frequency of exercise:  4-5 days/week    Duration of exercise:  30-45 minutes    Do you usually eat at least 4 servings of fruit and vegetables a day, include whole grains    & fiber and avoid regularly eating high fat or \"junk\" foods?  No    Taking medications regularly:  Yes    Medication side effects:  Not applicable    Ability to successfully perform activities of daily living:  No assistance needed    Home Safety:  Lack of grab bars in the bathroom    Hearing Impairment:  No hearing concerns    In the past 6 months, have you been bothered by leaking of urine?  No    In general, how would you rate your overall mental or emotional health?  Excellent      PHQ-2 Total Score: 0    Additional concerns today:  No    Patient has weaned off from estrogen patch since 1 year, since then she has been complaining of vaginal dryness and raw feeling and soreness at the vaginal opening, patient has been using over-the-counter Vaseline without much relief, patient is not sexually active, no vaginal discharge or itching.      Do you feel safe in your environment? Yes    Do you have a Health Care Directive? No: Advance care planning reviewed with patient; information given to patient to review.      Fall risk  Fallen 2 or more times in the past year?: No  Any fall with injury in the past year?: No    Cognitive Screening   1) Repeat 3 items (Leader, Season, Table)    2) Clock draw: NORMAL  3) 3 item recall: Recalls 3 objects  Results: 3 items recalled: COGNITIVE IMPAIRMENT LESS LIKELY    Mini-CogTM Copyright DENISE Flores. Licensed by the author for use in [facility]; reprinted with permission (chuckie@.[facility]). All rights reserved.      Do you have sleep apnea, excessive snoring or daytime " I spoke with [facility] de[facility]:   Dr. Joe Pimentel: 3pm in [address]  [address], [address]. Confirmed appt. Patient notified, verbalized understanding. Expresses gratitude. LE as fyi. drowsiness?: no    Reviewed and updated as needed this visit by clinical staff  Tobacco  Allergies  Meds  Med Hx  Surg Hx  Fam Hx  Soc Hx        Reviewed and updated as needed this visit by Provider          Past Medical History:   Diagnosis Date     Arthritis      Disorder of bone and cartilage, unspecified      Esophageal reflux      Osteopenia 6/14    -1.5       Past Surgical History:   Procedure Laterality Date     C APPENDECTOMY       C LIGATE FALLOPIAN TUBE       C NONSPECIFIC PROCEDURE      vein stripping x 3     C TOTAL ABDOM HYSTERECTOMY      for fibroids with BSO     HC REMOVAL GALLBLADDER       HC REMOVE TONSILS/ADENOIDS,<14 Y/O       ORTHOPEDIC SURGERY      wrist 2017 [facility]        Current Outpatient Medications   Medication Sig Dispense Refill     cycloSPORINE (RESTASIS) 0.05 % ophthalmic emulsion 1 drop 2 times daily       IBUPROFEN 200 MG OR TABS 2 TABLET EVERY 4 TO 6 HOURS AS NEEDED for headaches        KRILL OIL  mg capsules       NAPROXEN 500 MG OR TBEC 1 TABLET TWICE DAILY prn 30 3     nitroGLYcerin (NITROSTAT) 0.4 MG sublingual tablet Place 1 tablet (0.4 mg) under the tongue every 5 minutes as needed for chest pain If you are still having symptoms after 3 doses (15 minutes) call 911. 25 tablet 0     polyethylene glycol (MIRALAX/GLYCOLAX) powder Take 1 capful by mouth as needed for constipation       polyethylene glycol 0.4%- propylene glycol 0.3% (SYSTANE ULTRA PF) 0.4-0.3 % SOLN ophthalmic solution Place 1 drop into both eyes 3 times daily as needed for dry eyes Reported on 2/28/2017       vitamin D3 (CHOLECALCIFEROL) 2000 units tablet Take 1 tablet by mouth daily         Family History   Problem Relation Age of Onset     Diabetes Mother         born 1927     Hypertension Mother      Cancer Paternal Grandfather         skin     Colon Cancer Maternal Grandfather        Social History     Tobacco Use     Smoking status: Never Smoker     Smokeless tobacco: Never Used   Substance Use  Topics     Alcohol use: No     Alcohol/week: 0.0 oz     If you drink alcohol do you typically have >3 drinks per day or >7 drinks per week? No    Alcohol Use 8/19/2019   Prescreen: >3 drinks/day or >7 drinks/week? No   Prescreen: >3 drinks/day or >7 drinks/week? -      Current providers sharing in care for this patient include:   Patient Care Team:  Andrae Burns MD as PCP - General (Internal Medicine)  Deidre Lowry NP as Assigned PCP    The following health maintenance items are reviewed in Epic and correct as of today:  Health Maintenance   Topic Date Due     ZOSTER IMMUNIZATION (2 of 3) 11/10/2011     PHQ-2  01/01/2019     FALL RISK ASSESSMENT  08/13/2019     MEDICARE ANNUAL WELLNESS VISIT  08/13/2019     MAMMO SCREENING  08/13/2019     INFLUENZA VACCINE (1) 09/01/2019     COLONOSCOPY  08/15/2020     LIPID  08/13/2023     ADVANCE CARE PLANNING  02/11/2024     DTAP/TDAP/TD IMMUNIZATION (5 - Td) 07/05/2026     DEXA  Completed     HEPATITIS C SCREENING  Completed     IPV IMMUNIZATION  Aged Out     MENINGITIS IMMUNIZATION  Aged Out          Review of Systems   Constitutional: Negative for chills and fever.   HENT: Negative for congestion, ear pain, hearing loss and sore throat.    Eyes: Negative for pain and visual disturbance.   Respiratory: Negative for cough and shortness of breath.    Cardiovascular: Negative for chest pain, palpitations and peripheral edema.   Gastrointestinal: Negative for abdominal pain, constipation, diarrhea, heartburn, hematochezia and nausea.   Breasts:  Negative for tenderness, breast mass and discharge.   Genitourinary: Negative for dysuria, frequency, genital sores, hematuria, pelvic pain, urgency, vaginal bleeding and vaginal discharge.        Vaginal dryness and pain    Musculoskeletal: Negative for arthralgias, joint swelling and myalgias.   Skin: Negative for rash.   Neurological: Negative for dizziness, weakness, headaches and paresthesias.  "  Psychiatric/Behavioral: Negative for mood changes. The patient is not nervous/anxious.         OBJECTIVE:   /68   Pulse 94   Temp 98  F (36.7  C) (Oral)   Resp 12   Ht 1.657 m (5' 5.25\")   Wt 60.3 kg (133 lb)   SpO2 99%   Breastfeeding? No   BMI 21.96 kg/m   Estimated body mass index is 23.12 kg/m  as calculated from the following:    Height as of 2/11/19: 1.657 m (5' 5.25\").    Weight as of 2/11/19: 63.5 kg (140 lb).  Physical Exam  GENERAL APPEARANCE: healthy, alert and no distress  EYES: Eyes grossly normal to inspection, PERRL and conjunctivae and sclerae normal  HENT: ear canals and TM's normal, nose and mouth without ulcers or lesions, oropharynx clear and oral mucous membranes moist  NECK: no adenopathy, no asymmetry, masses, or scars and thyroid normal to palpation  RESP: lungs clear to auscultation - no rales, rhonchi or wheezes  BREAST: normal without masses, tenderness or nipple discharge and no palpable axillary masses or adenopathy  CV: regular rate and rhythm, normal S1 S2,  no peripheral edema and peripheral pulses strong  ABDOMEN: soft, nontender, no hepatosplenomegaly, no masses and bowel sounds normal  MS: no musculoskeletal defects are noted and gait is age appropriate without ataxia  NEURO: Normal strength and tone, sensory exam grossly normal, mentation intact and speech normal  PSYCH: mentation appears normal and affect normal/bright  ; attempted pelvic exam using speculum but could not introduce speculum was painful and the introitus narrow, could not do bimanual exam sec to pain     ASSESSMENT / PLAN:        (Z00.00) Routine history and physical examination of adult  (primary encounter diagnosis)  Plan: Hemoglobin, Comprehensive metabolic panel,         Lipid panel reflex to direct LDL Fasting, TSH         with free T4 reflex            (R10.2) Vaginal pain  Plan: could not do pelvic exam due to pain and narrow introitus, pt was advised to f/u with OBGYN .         End of " "Life Planning:  Patient currently has an advanced directive:  No: Advance care planning reviewed with patient; information given to patient to review.    COUNSELING:  Reviewed preventive health counseling, as reflected in patient instructions       Regular exercise       Healthy diet/nutrition    Estimated body mass index is 23.12 kg/m  as calculated from the following:    Height as of 2/11/19: 1.657 m (5' 5.25\").    Weight as of 2/11/19: 63.5 kg (140 lb).         reports that she has never smoked. She has never used smokeless tobacco.      Appropriate preventive services were discussed with this patient, including applicable screening as appropriate for cardiovascular disease, diabetes, osteopenia/osteoporosis, and glaucoma.  As appropriate for age/gender, discussed screening for colorectal cancer, prostate cancer, breast cancer, and cervical cancer. Checklist reviewing preventive services available has been given to the patient.    Reviewed patients plan of care and provided an AVS. The Basic Care Plan (routine screening as documented in Health Maintenance) for Nita meets the Care Plan requirement. This Care Plan has been established and reviewed with the Patient.    Counseling Resources:  ATP IV Guidelines  Pooled Cohorts Equation Calculator  Breast Cancer Risk Calculator  FRAX Risk Assessment  ICSI Preventive Guidelines  Dietary Guidelines for Americans, 2010  [employer]'s MyPlate  ASA Prophylaxis  Lung CA Screening    Andrae Burns MD  [facility]    Identified Health Risks:  "

## 2023-02-28 ENCOUNTER — OFFICE VISIT (OUTPATIENT)
Dept: OBGYN | Facility: CLINIC | Age: 73
End: 2023-02-28
Payer: COMMERCIAL

## 2023-02-28 VITALS — BODY MASS INDEX: 24.99 KG/M2 | HEIGHT: 65 IN | WEIGHT: 150 LBS

## 2023-02-28 DIAGNOSIS — R10.2 VAGINAL PAIN: ICD-10-CM

## 2023-02-28 DIAGNOSIS — L98.9 BENIGN SKIN LESION OF FOREHEAD: ICD-10-CM

## 2023-02-28 DIAGNOSIS — N95.2 ATROPHIC VAGINITIS: ICD-10-CM

## 2023-02-28 DIAGNOSIS — K13.0 LIP LESION: ICD-10-CM

## 2023-02-28 DIAGNOSIS — L90.0 LICHEN SCLEROSUS: Primary | ICD-10-CM

## 2023-02-28 PROCEDURE — 99214 OFFICE O/P EST MOD 30 MIN: CPT | Performed by: OBSTETRICS & GYNECOLOGY

## 2023-02-28 RX ORDER — RIBOFLAVIN (VITAMIN B2) 100 MG
100 TABLET ORAL 3 TIMES DAILY
Status: ON HOLD | COMMUNITY
End: 2023-07-21

## 2023-02-28 RX ORDER — PHENAZOPYRIDINE HYDROCHLORIDE 95 MG/1
190 TABLET ORAL 3 TIMES DAILY
COMMUNITY
End: 2023-04-20

## 2023-02-28 RX ORDER — VITAMIN B COMPLEX
TABLET ORAL DAILY
Status: ON HOLD | COMMUNITY
End: 2023-07-21

## 2023-02-28 NOTE — NURSING NOTE
"Chief Complaint   Patient presents with     Follow Up     Follow up for Lichen and Atrophic Vaginitis. Reports no change in symptoms.        Initial Ht 1.651 m (5' 5\")   Wt 68 kg (150 lb)   BMI 24.96 kg/m   Estimated body mass index is 24.96 kg/m  as calculated from the following:    Height as of this encounter: 1.651 m (5' 5\").    Weight as of this encounter: 68 kg (150 lb).  BP completed using cuff size: regular    Questioned patient about current smoking habits.  Pt. has never smoked.          The following HM Due: NONE      The following patient reported/Care Every where data was sent to:  P ABSTRACT QUALITY INITIATIVES [[id number]]  Marina Aburto LPN             "

## 2023-03-01 ENCOUNTER — TELEPHONE (OUTPATIENT)
Dept: OBGYN | Facility: CLINIC | Age: 73
End: 2023-03-01
Payer: COMMERCIAL

## 2023-03-01 NOTE — TELEPHONE ENCOUNTER
Surgeon:ALIRIO AKERS   Assist:  No   Location: [facility]   Date/time preference:  any time     Surgery:  exam under anesthesia, vaginoscopy (with hysteroscope), vulvar biopsy   Length of Surgery:  30 min   Diagnosis:  vaginal stenosis, lichen sclerosis, urinary retention in the vagina   Anesthesia type:  GENERAL     Special instructions / equipment:  hysteroscope, [place] punch biopsy   Am admit or same day: SAME DAY   Bowel prep: No   Pre op: PCP   Office visit with surgeon prior to surgery: No   Schedule postop visit: 4  weeks     ThanksAlirio MD

## 2023-03-03 NOTE — TELEPHONE ENCOUNTER
Type of surgery: exam under anesthesia, vaginoscopy (with hysteroscope), vulvar biopsy  Location of surgery: [facility]  Date and time of surgery: 3/21/23 @ 7:30 am  Surgeon: Dr. Gallegos  Pre-Op Appt Date: Patient advised to schedule.  Post-Op Appt Date: 4/20/23   Packet sent out: Yes  Pre-cert/Authorization completed:  No  Date: 3/3/23

## 2023-03-04 NOTE — PROGRESS NOTES
"  SUBJECTIVE:                                                   Nita Trevizo is a 72 year old female who presents to clinic today to follow up for vulvar lichen sclerosus, urine leakage, and vulvovaginal pain. Please see my last note for complete details.    Since I last saw her, she has been using clobetasol ointment twice daily and estrogen once daily.  She still is having some urine leakage when she stands up, but leaning forwards when she urinates has been helping her feel that the leakage is improved.  On her last exam, the urethra is completely obscured by scar tissue and adhesions in the midline around the introitus, and I felt that most of her new onset urine leakage, which was not associated with any stress or increase in abdominal pressure and was also not associated with urgency was likely urine being trapped in the vagina and then coming out when she stood up.  Again, she still has this a little bit changing her position when urinating has helped somewhat.  She still does note that the skin stings when urine touches it.  She has had a hard time getting the Estrace in the right location because she states \"I cannot find an opening at all.\"    She also has a lesion on her lip she would like me to look at, as well as a bump on her forehead.  Both have been there for quite some time, but her family encouraged her if she was coming to the doctor to have someone look at it.  We discussed quite frankly that this is not my area of expertise, but that I would be happy to take a look at it for her and let her know if it was anything to be concerned about.      Problem list and histories reviewed & adjusted, as indicated.  Additional history: as documented.    Patient Active Problem List   Diagnosis     Chronic maxillary sinusitis     Esophageal reflux     Osteopenia     Dry eyes, bilateral     Irritable bowel syndrome with both constipation and diarrhea     Varicose veins of both legs with edema     Essential " hypertension     Past Surgical History:   Procedure Laterality Date     ABDOMEN SURGERY       BLADDER SURGERY       COLONOSCOPY       COLONOSCOPY N/A 10/16/2020    Procedure: COLONOSCOPY with random colon biopsies;  Surgeon: Louie Patiño MD;  Location:  GI     CYSTOSCOPY       GENITOURINARY SURGERY       HC REMOVAL GALLBLADDER       HC REMOVE TONSILS/ADENOIDS,<13 Y/O       ORTHOPEDIC SURGERY      wrist 2017 [facility]      VASCULAR SURGERY       ZZC APPENDECTOMY       ZZC LIGATE FALLOPIAN TUBE       ZZC NONSPECIFIC PROCEDURE      vein stripping x 3     ZZC TOTAL ABDOM HYSTERECTOMY      for fibroids with BSO      Social History     Tobacco Use     Smoking status: Never     Smokeless tobacco: Never   Substance Use Topics     Alcohol use: No     Alcohol/week: 0.0 standard drinks      Problem (# of Occurrences) Relation (Name,Age of Onset)    Substance Abuse (1) Brother (andrew)    Cancer (1) Paternal Grandfather: skin    Diabetes (1) Mother (Stefanie): born 1928    Hypertension (1) Mother (Stefanie)    Osteoporosis (1) Paternal Grandmother (alberto)    Obesity (1) Mother (Stefanie)    Colon Cancer (1) Maternal Grandfather (brit): had in his late 80s            clobetasol (TEMOVATE) 0.05 % external ointment, Apply topically.  Use daily during a flare, apply twice weekly between flares for prevention.  estradiol (ESTRACE) 0.1 MG/GM vaginal cream, Place 1 g vaginally twice a week  fluticasone (FLONASE) 50 MCG/ACT nasal spray, Spray 1 spray into both nostrils daily  IBUPROFEN 200 MG OR TABS, 2 TABLET EVERY 4 TO 6 HOURS AS NEEDED for headaches   lisinopril (ZESTRIL) 10 MG tablet, Take 1 tablet (10 mg) by mouth daily  Melatonin 10 MG TABS tablet, Take 10 mg by mouth nightly as needed for sleep  nitroGLYcerin (NITROSTAT) 0.4 MG sublingual tablet, Place 1 tablet (0.4 mg) under the tongue every 5 minutes as needed for chest pain If you are still having symptoms after 3 doses (15 minutes) call 911.  OMEPRAZOLE PO,  PRN  phenazopyridine (AZO URINARY PAIN RELIEF) 95 MG tablet, Take 190 mg by mouth 3 times daily  polyethylene glycol (MIRALAX/GLYCOLAX) powder, Take 1 capful by mouth as needed for constipation  Propylene Glycol (SYSTANE COMPLETE OP),   RESTASIS 0.05 % ophthalmic emulsion, INSTILL ONE DROP IN EACH EYE TWO TIMES A DAY  tiZANidine (ZANAFLEX) 4 MG tablet, Take 1 tablet (4 mg) by mouth nightly as needed for muscle spasms  vitamin C (ASCORBIC ACID) 100 MG tablet, Take 100 mg by mouth 3 times daily  Vitamin D3 (CHOLECALCIFEROL) 25 mcg (1000 units) tablet, Take by mouth daily    No current facility-administered medications on file prior to visit.    Allergies   Allergen Reactions     Ciprofloxacin      Rash, eyelids red puffy & itchy     Codeine      Esophogeal spasms     Erythromycin        ROS:  Negative except as noted in HPI.    OBJECTIVE:     Mons pubis is normal.  There is complete obliteration of the labia minora bilaterally secondary to the lichen sclerosus.  There is attenuation and scarring of the clitoral maynard but the maynard is retractable and no phimosis is significant.  There appears to be even more significant scarring of the vaginal opening.  There are no obvious signs of lichen planus, and I cannot ascertain if there are intravaginal adhesions or if there is scarring above the introitus.  There is almost complete agglutination in the midline, with only a 2 to 3 mm opening.  The urethra cannot be assessed at all.  The perianal area appears also consistent with lichen sclerosis but without any other lesions.    Skin: She has a 2.5 cm ovoid, mobile lesion on the right side of her forehead.  She also has 2 small bluish lesions on her lower lip, both of which appear to be vascular as they are blanchable.    In-Clinic Test Results:  No results found for this or any previous visit (from the past 24 hour(s)).    ASSESSMENT/PLAN:                                                        ICD-10-CM    1. Lichen sclerosus   L90.0       2. Vaginal pain  R10.2       3. Atrophic vaginitis  N95.2       4. Lip lesion  K13.0       5. Benign skin lesion of forehead  L98.9             -We reviewed again where to apply clobetasol, and I have encouraged her to continue that twice daily.  She has a good understanding of every where the clobetasol needs to go, and is able to demonstrate this.    -She should continue with the estradiol once daily at the introitus.  However, the introitus is almost completely obliterated.  There is only a small pinpoint opening.  I would like to know if there are intravaginal adhesions, and would also like to perform a vulvar biopsy to rule out lichen planus.  I recommend that we do this in the operating room under anesthesia, as attempting to introduce anything into the introitus may be uncomfortable for her.  We did discuss the risk, benefits, and alternatives to proceeding with the vaginoscopy with the hysteroscope, and an attempt to see what is behind the introitus.  If there are no other adhesions, we may be able to gently dilate the introitus and then after a week or 2, can have her start to do some pelvic floor physical therapy eventually working up to dilators.  I think this would just make her more comfortable in general, and certainly should help with the urine that I believe is being trapped in the vagina when she urinates normally.    -She would like to go ahead and schedule the above procedure, so a request was sent to the surgical scheduler.    -We will refer to dermatology at her request for the 2 lesions noted.    Yamileth Gallegos MD  [facility]

## 2023-03-08 ENCOUNTER — OFFICE VISIT (OUTPATIENT)
Dept: FAMILY MEDICINE | Facility: CLINIC | Age: 73
End: 2023-03-08
Payer: COMMERCIAL

## 2023-03-08 DIAGNOSIS — R23.8 VENOUS LAKE: ICD-10-CM

## 2023-03-08 DIAGNOSIS — L21.9 SEBORRHEIC DERMATITIS: Primary | ICD-10-CM

## 2023-03-08 DIAGNOSIS — L82.0 INFLAMED SEBORRHEIC KERATOSIS: ICD-10-CM

## 2023-03-08 DIAGNOSIS — D48.9 NEOPLASM OF UNCERTAIN BEHAVIOR: ICD-10-CM

## 2023-03-08 PROCEDURE — 17110 DESTRUCTION B9 LES UP TO 14: CPT | Performed by: NURSE PRACTITIONER

## 2023-03-08 PROCEDURE — 99204 OFFICE O/P NEW MOD 45 MIN: CPT | Mod: 25 | Performed by: NURSE PRACTITIONER

## 2023-03-08 RX ORDER — KETOCONAZOLE 20 MG/ML
SHAMPOO TOPICAL
Qty: 120 ML | Refills: 11 | Status: ON HOLD | OUTPATIENT
Start: 2023-03-08 | End: 2023-07-21

## 2023-03-08 RX ORDER — LIDOCAINE HCL 4 %
2 CREAM (GRAM) TOPICAL
Status: ON HOLD | COMMUNITY
End: 2023-07-21

## 2023-03-08 ASSESSMENT — PAIN SCALES - GENERAL: PAINLEVEL: NO PAIN (0)

## 2023-03-08 NOTE — PROGRESS NOTES
[facility] Dermatology Note  Encounter Date: Mar 8, 2023  Office Visit     Reviewed patients past medical history and pertinent chart review prior to patients visit today.     Dermatology Problem List:  Seborrheic dermatitis, given ketoconazole 2% shampoo 3/8/2023   Irritated seborrheic keratosis, cryotherapy left jawline 3/8/2023   Venous lake of lip  Neoplasm of right forehead, most likely cyst versus lipoma.    ____________________________________________    Assessment & Plan:     #Neoplasm of skin, right forehead.  Appears most consistent with lipoma versus cyst.  Offered ultrasound order for more definitive diagnosis.  Patient declines at this time and will let us know if it changes or grows.    # Seborrheic dermatitis, scalp and face. Discussed that this is a recurring rash for most people and will need some maintenance even after rash has resolved. Given prescription for ketoconazole 2% shampoo to use every 1-2 days while rash is present, and ongoing 1-3 times weekly when rash is well controlled. Advised to leave on for 2-5 minutes before rinsing.  Patient declined any topical steroids at this time and will let me know in the future if this is is not well controlled and we will add in a topical steroid.  Okay to use hydrocortisone 1% cream to the face twice daily with flares if needed.    # Irritated and inflamed Seborrheic Keratosis, left jawline. Discussed treatment options with patient including no treatment, cryotherapy, and shave removal. Patient prefers cryotherapy today due to irritation and itching. After verbal consent and discussion of risks and benefits including but no limited to dyspigmentation/scar, blister, and pain, 1 was(were) treated with 1-2mm freeze border for 2 cycles with liquid nitrogen. Post cryotherapy instructions were provided.     #Venous lake, lower lip x2.  Benign reassurance provided.    Follow-up as needed        CADY Olguin CNP on 3/8/2023 at  "7:52 AM   _______________________________________    CC: Derm Problem (1. Bump above r eye/2. Spot on L jawline/3. Itchy face/4. Blue dots on bottom lip)    HPI:  Ms. Nita Trevizo is a(n) 72 year old female who presents today as a new patient for spots of concern.  She has a spot on her right forehead that is been present at least several months up to a year.  She has not noticed any changes to it.  It is not tender or painful.  It is also not cosmetically bothersome to her. She also has some itchy feelings around her cheeks and temples and the skin feels like \"grainy sand\" is coming off when she rubs her face for the past few months. She washes her hair every other day and also gets dry itchy scalp. She uses tea tree oil shampoo.  She also has 2 purple spots on the lower lip that her daughter was concerned about.  They do not bother her and are not concerning to her.  They have not been growing.  She also has a spot on her left jawline that she picks at constantly and gets very irritated and has bled.  She would like this treated if possible.    Patient is otherwise feeling well, without additional skin concerns.      Physical Exam:  SKIN: Focused examination of face was performed.  -Right lateral forehead, about a 2 cm ill-defined subcutaneous soft nodule, some movement on palpation, does not seem to be tethered to underlying structures  -some mild inflammation and erythema of the eyebrows, lateral nose and cheeks  -2 spots on the lower lip with about 2 to 3 mm of purple/blue pigmented macules  -Left jawline, skin colored waxy stuck on papule    - No other lesions of concern on areas examined.     Medications:  Current Outpatient Medications   Medication     clobetasol (TEMOVATE) 0.05 % external ointment     Cranberry-Vitamin C (AZO CRANBERRY URINARY TRACT) 250-60 MG CAPS     estradiol (ESTRACE) 0.1 MG/GM vaginal cream     fluticasone (FLONASE) 50 MCG/ACT nasal spray     IBUPROFEN 200 MG OR TABS     lisinopril " (ZESTRIL) 10 MG tablet     Melatonin 10 MG TABS tablet     nitroGLYcerin (NITROSTAT) 0.4 MG sublingual tablet     OMEPRAZOLE PO     polyethylene glycol (MIRALAX/GLYCOLAX) powder     polyethylene glycol-propylene glycol (SYSTANE ULTRA) 0.4-0.3 % SOLN ophthalmic solution     RESTASIS 0.05 % ophthalmic emulsion     tiZANidine (ZANAFLEX) 4 MG tablet     vitamin C (ASCORBIC ACID) 100 MG tablet     Vitamin D3 (CHOLECALCIFEROL) 25 mcg (1000 units) tablet     phenazopyridine (AZO URINARY PAIN RELIEF) 95 MG tablet     Propylene Glycol (SYSTANE COMPLETE OP)     No current facility-administered medications for this visit.      Past Medical History:   Patient Active Problem List   Diagnosis     Chronic maxillary sinusitis     Esophageal reflux     Osteopenia     Dry eyes, bilateral     Irritable bowel syndrome with both constipation and diarrhea     Varicose veins of both legs with edema     Essential hypertension     Past Medical History:   Diagnosis Date     Arthritis      Disorder of bone and cartilage, unspecified      Esophageal reflux      Essential hypertension 7/26/2022     Osteopenia 6/14    -1.5       CC Yamileth Gallegos MD  [address] [place] [address] on close of this encounter.

## 2023-03-08 NOTE — LETTER
3/8/2023         RE: Nita Trevizo  [address]  [facility] [id number]        Dear Colleague,    Thank you for referring your patient, Nita Trevizo, to the [facility]. Please see a copy of my visit note below.    [facility] Dermatology Note  Encounter Date: Mar 8, 2023  Office Visit     Reviewed patients past medical history and pertinent chart review prior to patients visit today.     Dermatology Problem List:  Seborrheic dermatitis, given ketoconazole 2% shampoo 3/8/2023   Irritated seborrheic keratosis, cryotherapy left jawline 3/8/2023   Venous lake of lip  Neoplasm of right forehead, most likely cyst versus lipoma.    ____________________________________________    Assessment & Plan:     #Neoplasm of skin, right forehead.  Appears most consistent with lipoma versus cyst.  Offered ultrasound order for more definitive diagnosis.  Patient declines at this time and will let us know if it changes or grows.    # Seborrheic dermatitis, scalp and face. Discussed that this is a recurring rash for most people and will need some maintenance even after rash has resolved. Given prescription for ketoconazole 2% shampoo to use every 1-2 days while rash is present, and ongoing 1-3 times weekly when rash is well controlled. Advised to leave on for 2-5 minutes before rinsing.  Patient declined any topical steroids at this time and will let me know in the future if this is is not well controlled and we will add in a topical steroid.  Okay to use hydrocortisone 1% cream to the face twice daily with flares if needed.    # Irritated and inflamed Seborrheic Keratosis, left jawline. Discussed treatment options with patient including no treatment, cryotherapy, and shave removal. Patient prefers cryotherapy today due to irritation and itching. After verbal consent and discussion of risks and benefits including but no limited to dyspigmentation/scar, blister, and  "pain, 1 was(were) treated with 1-2mm freeze border for 2 cycles with liquid nitrogen. Post cryotherapy instructions were provided.     #Venous lake, lower lip x2.  Benign reassurance provided.    Follow-up as needed        CADY Olguin CNP on 3/8/2023 at 7:52 AM   _______________________________________    CC: Derm Problem (1. Bump above r eye/2. Spot on L jawline/3. Itchy face/4. Blue dots on bottom lip)    HPI:  Ms. Nita Trevizo is a(n) 72 year old female who presents today as a new patient for spots of concern.  She has a spot on her right forehead that is been present at least several months up to a year.  She has not noticed any changes to it.  It is not tender or painful.  It is also not cosmetically bothersome to her. She also has some itchy feelings around her cheeks and temples and the skin feels like \"grainy sand\" is coming off when she rubs her face for the past few months. She washes her hair every other day and also gets dry itchy scalp. She uses tea tree oil shampoo.  She also has 2 purple spots on the lower lip that her daughter was concerned about.  They do not bother her and are not concerning to her.  They have not been growing.  She also has a spot on her left jawline that she picks at constantly and gets very irritated and has bled.  She would like this treated if possible.    Patient is otherwise feeling well, without additional skin concerns.      Physical Exam:  SKIN: Focused examination of face was performed.  -Right lateral forehead, about a 2 cm ill-defined subcutaneous soft nodule, some movement on palpation, does not seem to be tethered to underlying structures  -some mild inflammation and erythema of the eyebrows, lateral nose and cheeks  -2 spots on the lower lip with about 2 to 3 mm of purple/blue pigmented macules  -Left jawline, skin colored waxy stuck on papule    - No other lesions of concern on areas examined.     Medications:  Current Outpatient Medications "   Medication     clobetasol (TEMOVATE) 0.05 % external ointment     Cranberry-Vitamin C (AZO CRANBERRY URINARY TRACT) 250-60 MG CAPS     estradiol (ESTRACE) 0.1 MG/GM vaginal cream     fluticasone (FLONASE) 50 MCG/ACT nasal spray     IBUPROFEN 200 MG OR TABS     lisinopril (ZESTRIL) 10 MG tablet     Melatonin 10 MG TABS tablet     nitroGLYcerin (NITROSTAT) 0.4 MG sublingual tablet     OMEPRAZOLE PO     polyethylene glycol (MIRALAX/GLYCOLAX) powder     polyethylene glycol-propylene glycol (SYSTANE ULTRA) 0.4-0.3 % SOLN ophthalmic solution     RESTASIS 0.05 % ophthalmic emulsion     tiZANidine (ZANAFLEX) 4 MG tablet     vitamin C (ASCORBIC ACID) 100 MG tablet     Vitamin D3 (CHOLECALCIFEROL) 25 mcg (1000 units) tablet     phenazopyridine (AZO URINARY PAIN RELIEF) 95 MG tablet     Propylene Glycol (SYSTANE COMPLETE OP)     No current facility-administered medications for this visit.      Past Medical History:   Patient Active Problem List   Diagnosis     Chronic maxillary sinusitis     Esophageal reflux     Osteopenia     Dry eyes, bilateral     Irritable bowel syndrome with both constipation and diarrhea     Varicose veins of both legs with edema     Essential hypertension     Past Medical History:   Diagnosis Date     Arthritis      Disorder of bone and cartilage, unspecified      Esophageal reflux      Essential hypertension 7/26/2022     Osteopenia 6/14    -1.5       CC Yamileth Gallegos MD  [address] [place] [address] on close of this encounter.       Again, thank you for allowing me to participate in the care of your patient.        Sincerely,        Latonia Benz, CADY CNP

## 2023-03-16 ENCOUNTER — OFFICE VISIT (OUTPATIENT)
Dept: INTERNAL MEDICINE | Facility: CLINIC | Age: 73
End: 2023-03-16
Payer: COMMERCIAL

## 2023-03-16 VITALS
TEMPERATURE: 97.3 F | RESPIRATION RATE: 14 BRPM | SYSTOLIC BLOOD PRESSURE: 128 MMHG | WEIGHT: 150 LBS | HEART RATE: 89 BPM | BODY MASS INDEX: 24.99 KG/M2 | HEIGHT: 65 IN | DIASTOLIC BLOOD PRESSURE: 62 MMHG | OXYGEN SATURATION: 98 %

## 2023-03-16 DIAGNOSIS — N89.5 VAGINAL STENOSIS: ICD-10-CM

## 2023-03-16 DIAGNOSIS — Z01.818 PREOP GENERAL PHYSICAL EXAM: Primary | ICD-10-CM

## 2023-03-16 DIAGNOSIS — I10 ESSENTIAL HYPERTENSION: ICD-10-CM

## 2023-03-16 DIAGNOSIS — L90.0 LICHEN SCLEROSUS: ICD-10-CM

## 2023-03-16 LAB
ANION GAP SERPL CALCULATED.3IONS-SCNC: 12 MMOL/L (ref 7–15)
BUN SERPL-MCNC: 16.3 MG/DL (ref 8–23)
CALCIUM SERPL-MCNC: 9.6 MG/DL (ref 8.8–10.2)
CHLORIDE SERPL-SCNC: 103 MMOL/L (ref 98–107)
CREAT SERPL-MCNC: 0.74 MG/DL (ref 0.51–0.95)
DEPRECATED HCO3 PLAS-SCNC: 26 MMOL/L (ref 22–29)
GFR SERPL CREATININE-BSD FRML MDRD: 86 ML/MIN/1.73M2
GLUCOSE SERPL-MCNC: 81 MG/DL (ref 70–99)
HGB BLD-MCNC: 14.2 G/DL (ref 11.7–15.7)
POTASSIUM SERPL-SCNC: 3.9 MMOL/L (ref 3.4–5.3)
SODIUM SERPL-SCNC: 141 MMOL/L (ref 136–145)

## 2023-03-16 PROCEDURE — 36415 COLL VENOUS BLD VENIPUNCTURE: CPT | Performed by: INTERNAL MEDICINE

## 2023-03-16 PROCEDURE — 85018 HEMOGLOBIN: CPT | Performed by: INTERNAL MEDICINE

## 2023-03-16 PROCEDURE — 99214 OFFICE O/P EST MOD 30 MIN: CPT | Performed by: INTERNAL MEDICINE

## 2023-03-16 PROCEDURE — 80048 BASIC METABOLIC PNL TOTAL CA: CPT | Performed by: INTERNAL MEDICINE

## 2023-03-16 PROCEDURE — 93000 ELECTROCARDIOGRAM COMPLETE: CPT | Performed by: INTERNAL MEDICINE

## 2023-03-16 RX ORDER — LISINOPRIL 10 MG/1
10 TABLET ORAL DAILY
Qty: 90 TABLET | Refills: 1 | Status: SHIPPED | OUTPATIENT
Start: 2023-03-16 | End: 2023-10-02

## 2023-03-16 NOTE — PROGRESS NOTES
[place] [address]  [address]  [facility] [address]  Phone: [phone]  Primary Provider: Keri Noyola  Pre-op Performing Provider: KERI NOYOLA      PREOPERATIVE EVALUATION:  Today's date: 3/16/2023    Nita Trevizo is a 72 year old female who presents for a preoperative evaluation.    Surgical Information:  Surgery/Procedure: vaginoscopy (with hysteroscope), vulvar biopsy  Surgery Location: [facility]   Surgeon: Dr. Gallegos  Surgery Date: 3/21/23    Time of Surgery: 0730  Where patient plans to recover: At home with family  Fax number for surgical facility: [phone]    Type of Anesthesia Anticipated: General    Assessment & Plan     The proposed surgical procedure is considered LOW risk.    (Z01.818) Preop general physical exam  (primary encounter diagnosis)  Comment: vaginoscopy (with hysteroscope), vulvar biopsy  Plan: EKG 12-lead complete w/read - Clinics,         Hemoglobin, Basic metabolic panel            (N89.5) Vaginal stenosis  (L90.0) Lichen sclerosus  Plan: vaginoscopy (with hysteroscope), vulvar biopsy    (I10) Essential hypertension  Comment: BP stable  Plan: lisinopril (ZESTRIL) 10 MG tablet refilled.explained clearly about the medication,insructions and side effects.         Risks and Recommendations:  The patient has the following additional risks and recommendations for perioperative complications:   - No identified additional risk factors other than previously addressed    Medication Instructions:   - aspirin: Discontinue aspirin 7-  days prior to procedure to reduce bleeding risk.     - ACE/ARB: May be continued on the day of surgery.    - NSAID's: HOLD 3 days before surgery.     RECOMMENDATION:  APPROVAL GIVEN to proceed with proposed procedure, without further diagnostic evaluation.    Review of the result(s) of each unique test - HGb, BMP   Prescription drug management     Subjective     HPI related to  upcoming procedure: vaginoscopy (with hysteroscope), vulvar biopsy for vaginal stenosis, lichen sclerosis, urinary retention in the vagina     Preop Questions 3/9/2023   1. Have you ever had a heart attack or stroke? No   2. Have you ever had surgery on your heart or blood vessels, such as a stent placement, a coronary artery bypass, or surgery on an artery in your head, neck, heart, or legs? No   3. Do you have chest pain with activity? No   4. Do you have a history of  heart failure? No   5. Do you currently have a cold, bronchitis or symptoms of other infection? No   6. Do you have a cough, shortness of breath, or wheezing? No   7. Do you or anyone in your family have previous history of blood clots? No   8. Do you or does anyone in your family have a serious bleeding problem such as prolonged bleeding following surgeries or cuts? No   9. Have you ever had problems with anemia or been told to take iron pills? No   10. Have you had any abnormal blood loss such as black, tarry or bloody stools, or abnormal vaginal bleeding? No   11. Have you ever had a blood transfusion? No   12. Are you willing to have a blood transfusion if it is medically needed before, during, or after your surgery? Yes   13. Have you or any of your relatives ever had problems with anesthesia? No   14. Do you have sleep apnea, excessive snoring or daytime drowsiness? No   15. Do you have any artifical heart valves or other implanted medical devices like a pacemaker, defibrillator, or continuous glucose monitor? No   16. Do you have artificial joints? No   17. Are you allergic to latex? No   18. Is there any chance that you may be pregnant? -       Health Care Directive:  Patient does not have a Health Care Directive or Living Will: Discussed advance care planning with patient; information given to patient to review.     Status of Chronic Conditions:  HYPERTENSION - Patient has longstanding history of HTN , currently denies any symptoms  referable to elevated blood pressure. Specifically denies chest pain, palpitations, dyspnea, orthopnea, PND or peripheral edema. Blood pressure readings have been in normal range. Current medication regimen is as listed below. Patient denies any side effects of medication.       Review of Systems  CONSTITUTIONAL: NEGATIVE for fever, chills, change in weight  EYES: NEGATIVE for vision changes or irritation  ENT/MOUTH: NEGATIVE for ear, mouth and throat problems  RESP: NEGATIVE for significant cough or SOB  CV: NEGATIVE for chest pain, palpitations or peripheral edema  GI: NEGATIVE for nausea, abdominal pain, heartburn, or change in bowel habits   female: vaginal stenosis  MUSCULOSKELETAL: NEGATIVE for significant arthralgias or myalgia  NEURO: NEGATIVE for weakness, dizziness or paresthesias  ENDOCRINE: NEGATIVE for temperature intolerance, skin/hair changes  HEME: NEGATIVE for bleeding problems  PSYCHIATRIC: NEGATIVE for changes in mood or affect    Patient Active Problem List    Diagnosis Date Noted     Varicose veins of both legs with edema 07/26/2022     Priority: Medium     Essential hypertension 07/26/2022     Priority: Medium     Irritable bowel syndrome with both constipation and diarrhea 05/28/2020     Priority: Medium     Dry eyes, bilateral 10/12/2016     Priority: Medium     Osteopenia 08/01/2016     Priority: Medium     Esophageal reflux 06/27/2006     Priority: Medium     Chronic maxillary sinusitis 08/03/2004     Priority: Medium      Past Medical History:   Diagnosis Date     Arthritis      Disorder of bone and cartilage, unspecified      Esophageal reflux      Essential hypertension 7/26/2022     Osteopenia 6/14    -1.5     Past Surgical History:   Procedure Laterality Date     ABDOMEN SURGERY       BLADDER SURGERY       COLONOSCOPY       COLONOSCOPY N/A 10/16/2020    Procedure: COLONOSCOPY with random colon biopsies;  Surgeon: Louie Patiño MD;  Location:  GI     CYSTOSCOPY        GENITOURINARY SURGERY       HC REMOVAL GALLBLADDER       HC REMOVE TONSILS/ADENOIDS,<13 Y/O       ORTHOPEDIC SURGERY      wrist 2017 [facility]      VASCULAR SURGERY       ZZC APPENDECTOMY       ZZC LIGATE FALLOPIAN TUBE       ZZC NONSPECIFIC PROCEDURE      vein stripping x 3     ZZC TOTAL ABDOM HYSTERECTOMY      for fibroids with BSO     Current Outpatient Medications   Medication Sig Dispense Refill     Cetirizine-Pseudoephedrine (ZYRTEC-D PO) PRN       clobetasol (TEMOVATE) 0.05 % external ointment Apply topically.  Use daily during a flare, apply twice weekly between flares for prevention. 45 g 3     Cranberry-Vitamin C 250-60 MG CAPS Take 2 capsules by mouth       estradiol (ESTRACE) 0.1 MG/GM vaginal cream Place 1 g vaginally twice a week 42.5 g 1     fluticasone (FLONASE) 50 MCG/ACT nasal spray Spray 1 spray into both nostrils daily       ketoconazole (NIZORAL) 2 % external shampoo Use every 1-2 days when flared. Leave in few minutes before rinsing. Use twice weekly to prevent flares. 120 mL 11     lisinopril (ZESTRIL) 10 MG tablet Take 1 tablet (10 mg) by mouth daily 90 tablet 1     Melatonin 10 MG TABS tablet Take 10 mg by mouth nightly as needed for sleep       OMEPRAZOLE PO PRN       polyethylene glycol (MIRALAX/GLYCOLAX) powder Take 1 capful by mouth as needed for constipation       polyethylene glycol-propylene glycol (SYSTANE ULTRA) 0.4-0.3 % SOLN ophthalmic solution Place 1 drop into both eyes every hour as needed for dry eyes       Propylene Glycol (SYSTANE COMPLETE OP)        RESTASIS 0.05 % ophthalmic emulsion INSTILL ONE DROP IN EACH EYE TWO TIMES A DAY  3     tiZANidine (ZANAFLEX) 4 MG tablet Take 1 tablet (4 mg) by mouth nightly as needed for muscle spasms 30 tablet 0     vitamin C (ASCORBIC ACID) 100 MG tablet Take 100 mg by mouth 3 times daily       Vitamin D3 (CHOLECALCIFEROL) 25 mcg (1000 units) tablet Take by mouth daily       IBUPROFEN 200 MG OR TABS 2 TABLET EVERY 4 TO 6 HOURS AS NEEDED  "for headaches  (Patient not taking: Reported on 3/16/2023)       nitroGLYcerin (NITROSTAT) 0.4 MG sublingual tablet Place 1 tablet (0.4 mg) under the tongue every 5 minutes as needed for chest pain If you are still having symptoms after 3 doses (15 minutes) call 911. (Patient not taking: Reported on 3/16/2023) 25 tablet 0     phenazopyridine (AZO URINARY PAIN RELIEF) 95 MG tablet Take 190 mg by mouth 3 times daily (Patient not taking: Reported on 3/8/2023)         Allergies   Allergen Reactions     Ciprofloxacin      Rash, eyelids red puffy & itchy     Codeine      Esophogeal spasms     Erythromycin         Social History     Tobacco Use     Smoking status: Never     Smokeless tobacco: Never   Substance Use Topics     Alcohol use: No     Alcohol/week: 0.0 standard drinks     Family History   Problem Relation Age of Onset     Diabetes Mother         born 192     Hypertension Mother      Obesity Mother      Cancer Paternal Grandfather         skin     Colon Cancer Maternal Grandfather         had in his late 80s     Substance Abuse Brother         hep c  .     Osteoporosis Paternal Grandmother      History   Drug Use No         Objective     /62   Pulse 120   Temp 97.3  F (36.3  C) (Oral)   Resp 14   Ht 1.651 m (5' 5\")   Wt 68 kg (150 lb)   SpO2 98%   BMI 24.96 kg/m      Physical Exam    GENERAL APPEARANCE: healthy, alert and no distress     EYES: EOMI, PERRL     RESP: lungs clear to auscultation - no rales, rhonchi or wheezes     CV: regular rates and rhythm, normal S1 S2,       ABDOMEN:  soft, nontender, no HSM or masses and bowel sounds normal     MS: extremities normal- no gross deformities noted, no evidence of inflammation in joints, FROM in all extremities.     NEURO: Normal strength and tone, sensory exam grossly normal, mentation intact and speech normal     PSYCH: mentation appears normal. and affect normal/bright     LYMPHATICS: No cervical adenopathy    Recent Labs   Lab Test " 09/21/22  1107 06/20/22  1247 02/24/22  1359   HGB 14.3 13.4 14.1   PLT  --  220 223    143 141   POTASSIUM 4.0 3.6 3.9   CR 0.78 0.69 0.77        Diagnostics:  Recent Results (from the past 24 hour(s))   Hemoglobin    Collection Time: 03/16/23 10:15 AM   Result Value Ref Range    Hemoglobin 14.2 11.7 - 15.7 g/dL      EKG: Sinus rhythm ,  normal intervals, no acute ST/T changes c/w ischemia, no LVH by voltage criteria,      Revised Cardiac Risk Index (RCRI):  The patient has the following serious cardiovascular risks for perioperative complications:   - No serious cardiac risks = 0 points          Signed Electronically by: Andrae Burns MD  Copy of this evaluation report is provided to requesting physician.

## 2023-03-21 ENCOUNTER — LAB REQUISITION (OUTPATIENT)
Dept: LAB | Facility: CLINIC | Age: 73
End: 2023-03-21
Payer: COMMERCIAL

## 2023-03-21 DIAGNOSIS — N89.5 STRICTURE AND ATRESIA OF VAGINA: ICD-10-CM

## 2023-03-21 DIAGNOSIS — R39.14 FEELING OF INCOMPLETE BLADDER EMPTYING: ICD-10-CM

## 2023-03-21 DIAGNOSIS — L90.0 LICHEN SCLEROSUS ET ATROPHICUS: ICD-10-CM

## 2023-03-21 PROCEDURE — 88313 SPECIAL STAINS GROUP 2: CPT | Mod: 26 | Performed by: DERMATOLOGY

## 2023-03-21 PROCEDURE — 88312 SPECIAL STAINS GROUP 1: CPT | Mod: TC,ORL | Performed by: OBSTETRICS & GYNECOLOGY

## 2023-03-21 PROCEDURE — 88312 SPECIAL STAINS GROUP 1: CPT | Mod: 26 | Performed by: DERMATOLOGY

## 2023-03-21 PROCEDURE — 88305 TISSUE EXAM BY PATHOLOGIST: CPT | Mod: 26 | Performed by: DERMATOLOGY

## 2023-03-29 LAB
PATH REPORT.COMMENTS IMP SPEC: NORMAL
PATH REPORT.FINAL DX SPEC: NORMAL
PATH REPORT.GROSS SPEC: NORMAL
PATH REPORT.MICROSCOPIC SPEC OTHER STN: NORMAL
PATH REPORT.RELEVANT HX SPEC: NORMAL

## 2023-04-04 ENCOUNTER — MYC MEDICAL ADVICE (OUTPATIENT)
Dept: FAMILY MEDICINE | Facility: CLINIC | Age: 73
End: 2023-04-04
Payer: COMMERCIAL

## 2023-04-14 ENCOUNTER — TELEPHONE (OUTPATIENT)
Dept: FAMILY MEDICINE | Facility: CLINIC | Age: 73
End: 2023-04-14
Payer: COMMERCIAL

## 2023-04-14 NOTE — TELEPHONE ENCOUNTER
Spoke with patient and scheduled follow up appt with Cynthia at  on 4/26/23. Patient expressed understanding.     Josee MCCLENDON RN  [facility] Dermatology  [phone]

## 2023-04-14 NOTE — TELEPHONE ENCOUNTER
[facility] Call Center    Phone Message    May a detailed message be left on voicemail: yes     Reason for Call: Other: Pt. States she was told to get back in with Tuyet but she will be in [place] at end of May when first availability. Pt wanted to check that it was okay to wait and schedule after trip before doing so. Pt. States she thought Latonia Tuyet might want to see her before then. Please call pt to discuss. Thanks!      Action Taken: Other: Derm    Travel Screening: Not Applicable

## 2023-04-14 NOTE — TELEPHONE ENCOUNTER
Left a voicemail asking patient to give us a call back at our direct line [phone].   Also gave them the main derm number [phone] in case they cannot reach us at the direct line.     Josee MCCLENDON RN  [facility] Dermatology  [phone]

## 2023-04-14 NOTE — TELEPHONE ENCOUNTER
Your last note says follow up as needed. Please advise if this can wait or if we are adding on to schedule.    Thank you,    Kiersten DUVAL RN BSN  [facility] Dermatology- [phone]

## 2023-04-20 ENCOUNTER — OFFICE VISIT (OUTPATIENT)
Dept: OBGYN | Facility: CLINIC | Age: 73
End: 2023-04-20
Payer: COMMERCIAL

## 2023-04-20 VITALS
SYSTOLIC BLOOD PRESSURE: 122 MMHG | WEIGHT: 147 LBS | BODY MASS INDEX: 24.49 KG/M2 | DIASTOLIC BLOOD PRESSURE: 76 MMHG | HEIGHT: 65 IN

## 2023-04-20 DIAGNOSIS — L90.0 LICHEN SCLEROSUS: ICD-10-CM

## 2023-04-20 DIAGNOSIS — N95.2 ATROPHIC VAGINITIS: Primary | ICD-10-CM

## 2023-04-20 PROCEDURE — 99214 OFFICE O/P EST MOD 30 MIN: CPT | Performed by: OBSTETRICS & GYNECOLOGY

## 2023-04-20 RX ORDER — PHENAZOPYRIDINE HYDROCHLORIDE 95 MG/1
190 TABLET ORAL 3 TIMES DAILY
COMMUNITY
End: 2024-04-23

## 2023-04-20 RX ORDER — ESTRADIOL 0.1 MG/G
CREAM VAGINAL
Qty: 42.5 G | Refills: 6 | Status: SHIPPED | OUTPATIENT
Start: 2023-04-20

## 2023-04-20 RX ORDER — CRANBERRY FRUIT EXTRACT 250 MG
CAPSULE ORAL 2 TIMES DAILY
COMMUNITY

## 2023-04-20 NOTE — NURSING NOTE
"Chief Complaint   Patient presents with     Surgical Followup     1 month post op. Doing well.        Initial /76   Ht 1.651 m (5' 5\")   Wt 66.7 kg (147 lb)   BMI 24.46 kg/m   Estimated body mass index is 24.46 kg/m  as calculated from the following:    Height as of this encounter: 1.651 m (5' 5\").    Weight as of this encounter: 66.7 kg (147 lb).  BP completed using cuff size: regular    Questioned patient about current smoking habits.  Pt. has never smoked.          The following HM Due: NONE      The following patient reported/Care Every where data was sent to:  P ABSTRACT QUALITY INITIATIVES [[id number]]  Marina Aburto LPN             "

## 2023-04-26 ENCOUNTER — OFFICE VISIT (OUTPATIENT)
Dept: FAMILY MEDICINE | Facility: CLINIC | Age: 73
End: 2023-04-26
Payer: COMMERCIAL

## 2023-04-26 DIAGNOSIS — L29.9 PRURITUS: Primary | ICD-10-CM

## 2023-04-26 DIAGNOSIS — L21.9 SEBORRHEIC DERMATITIS: ICD-10-CM

## 2023-04-26 PROCEDURE — 99214 OFFICE O/P EST MOD 30 MIN: CPT | Performed by: NURSE PRACTITIONER

## 2023-04-26 RX ORDER — FLUOCINONIDE TOPICAL SOLUTION USP, 0.05% 0.5 MG/ML
SOLUTION TOPICAL 2 TIMES DAILY
Qty: 60 ML | Refills: 1 | Status: ON HOLD | OUTPATIENT
Start: 2023-04-26 | End: 2023-07-21

## 2023-04-26 ASSESSMENT — PAIN SCALES - GENERAL: PAINLEVEL: NO PAIN (0)

## 2023-04-26 NOTE — LETTER
4/26/2023         RE: Nita Trevizo  [address]  [facility] [address]        Dear Colleague,    Thank you for referring your patient, Nita Trevizo, to the [facility]. Please see a copy of my visit note below.    [facility] Dermatology Note  Encounter Date: Apr 26, 2023  Office Visit     Reviewed patients past medical history and pertinent chart review prior to patients visit today.     Dermatology Problem List:  Has lichen sclerosus, following with GYN Dr. Price  Generalized pruritus of the upper extremities, chest upper back and face and scalp.  Favor this is an underlying nerve activation  Seborrheic dermatitis, flaking controlled with ketoconazole 2% shampoo, did not help itching.  See above    ____________________________________________    Assessment & Plan:     #Generalized pruritus of the upper arms, chest, upper back, face and scalp.  She does not have any active rash in these areas.  I favor that this is likely due to an underlying nerve activation possibly from a pinched nerve or referred pain sensation.  I have asked her to find follow-up with primary care for more of a work-up.    #Seborrheic dermatitis, well controlled.  Continue the ketoconazole 2% shampoo on an as-needed basis to control the flaking of the scalp    Cynthia Benz CNP  Dermatology    _______________________________________    CC: Derm Problem (Rash recheck/Skin tender to touch under both arms, across chest, armpit, and upper back/no rash or redness)    HPI:  Ms. Nita Trevizo is a(n) 72 year old female who presents today as a return patient for follow-up of seborrheic dermatitis of the scalp as well as generalized itching.  She states that the flaking is completely resolved with the ketoconazole shampoo but she continues to use the hydrocortisone 1% on the face every day and says that it is not helpful with the itching.  She continues to have persistent itching  on the scalp, face, and now on the chest upper back and arms as well.  She feels like her skin is really sensitive to the touch even though there is no rash.  She has had some neck trouble in the past and sees a chiropractor.    Patient is otherwise feeling well, without additional skin concerns.      Physical Exam:  SKIN: Focused examination of chest, upper back, upper arms, face, neck, scalp was performed.  -No flaking or erythema of the face or scalp.  Skin otherwise looks very healthy without any skin abnormalities rash papules or pustules    - No other lesions of concern on areas examined.     Medications:  Current Outpatient Medications   Medication     Cetirizine-Pseudoephedrine (ZYRTEC-D PO)     clobetasol (TEMOVATE) 0.05 % external ointment     Cranberry 250 MG CAPS     Cranberry-Vitamin C 250-60 MG CAPS     estradiol (ESTRACE VAGINAL) 0.1 MG/GM vaginal cream     fluticasone (FLONASE) 50 MCG/ACT nasal spray     IBUPROFEN 200 MG OR TABS     ketoconazole (NIZORAL) 2 % external shampoo     lisinopril (ZESTRIL) 10 MG tablet     Melatonin 10 MG TABS tablet     OMEPRAZOLE PO     polyethylene glycol (MIRALAX/GLYCOLAX) powder     polyethylene glycol-propylene glycol (SYSTANE ULTRA) 0.4-0.3 % SOLN ophthalmic solution     RESTASIS 0.05 % ophthalmic emulsion     Vitamin D3 (CHOLECALCIFEROL) 25 mcg (1000 units) tablet     nitroGLYcerin (NITROSTAT) 0.4 MG sublingual tablet     phenazopyridine (AZO URINARY PAIN RELIEF) 95 MG tablet     tiZANidine (ZANAFLEX) 4 MG tablet     vitamin C (ASCORBIC ACID) 100 MG tablet     No current facility-administered medications for this visit.      Past Medical History:   Patient Active Problem List   Diagnosis     Chronic maxillary sinusitis     Esophageal reflux     Osteopenia     Dry eyes, bilateral     Irritable bowel syndrome with both constipation and diarrhea     Varicose veins of both legs with edema     Essential hypertension     Vaginal stenosis     Lichen sclerosus     Past  Medical History:   Diagnosis Date     Arthritis      Disorder of bone and cartilage, unspecified      Esophageal reflux      Essential hypertension 7/26/2022     Osteopenia 6/14    -1.5       CC No referring provider defined for this encounter. on close of this encounter.       Again, thank you for allowing me to participate in the care of your patient.        Sincerely,        CADY Olguin CNP

## 2023-04-26 NOTE — PROGRESS NOTES
[facility] Dermatology Note  Encounter Date: Apr 26, 2023  Office Visit     Reviewed patients past medical history and pertinent chart review prior to patients visit today.     Dermatology Problem List:  Has lichen sclerosus, following with GYN Dr. Price  Generalized pruritus of the upper extremities, chest upper back and face and scalp.  Favor this is an underlying nerve activation  Seborrheic dermatitis, flaking controlled with ketoconazole 2% shampoo, did not help itching.  See above    ____________________________________________    Assessment & Plan:     #Generalized pruritus of the upper arms, chest, upper back, face and scalp.  She does not have any active rash in these areas.  I favor that this is likely due to an underlying nerve activation possibly from a pinched nerve or referred pain sensation.  I have asked her to find follow-up with primary care for more of a work-up.    #Seborrheic dermatitis, well controlled.  Continue the ketoconazole 2% shampoo on an as-needed basis to control the flaking of the scalp.  I did give her a prescription for fluocinonide 0.05% solution to use twice daily for the next 3 weeks to see if this helps with itching of the scalp as this is the most bothersome to her.  If this is not helpful in 3 weeks I do want her to discontinue this.Councled about use and side effects of thinning of the skin, striae, erythema, and worsening of rash.       Cynthia Benz, CNP  Dermatology    _______________________________________    CC: Derm Problem (Rash recheck/Skin tender to touch under both arms, across chest, armpit, and upper back/no rash or redness)    HPI:  Ms. Nita Trevizo is a(n) 72 year old female who presents today as a return patient for follow-up of seborrheic dermatitis of the scalp as well as generalized itching.  She states that the flaking is completely resolved with the ketoconazole shampoo but she continues to use the hydrocortisone 1% on the face  every day and says that it is not helpful with the itching.  She continues to have persistent itching on the scalp, face, and now on the chest upper back and arms as well.  She feels like her skin is really sensitive to the touch even though there is no rash.  She has had some neck trouble in the past and sees a chiropractor.    Patient is otherwise feeling well, without additional skin concerns.      Physical Exam:  SKIN: Focused examination of chest, upper back, upper arms, face, neck, scalp was performed.  -No flaking or erythema of the face or scalp.  Skin otherwise looks very healthy without any skin abnormalities rash papules or pustules    - No other lesions of concern on areas examined.     Medications:  Current Outpatient Medications   Medication     Cetirizine-Pseudoephedrine (ZYRTEC-D PO)     clobetasol (TEMOVATE) 0.05 % external ointment     Cranberry 250 MG CAPS     Cranberry-Vitamin C 250-60 MG CAPS     estradiol (ESTRACE VAGINAL) 0.1 MG/GM vaginal cream     fluticasone (FLONASE) 50 MCG/ACT nasal spray     IBUPROFEN 200 MG OR TABS     ketoconazole (NIZORAL) 2 % external shampoo     lisinopril (ZESTRIL) 10 MG tablet     Melatonin 10 MG TABS tablet     OMEPRAZOLE PO     polyethylene glycol (MIRALAX/GLYCOLAX) powder     polyethylene glycol-propylene glycol (SYSTANE ULTRA) 0.4-0.3 % SOLN ophthalmic solution     RESTASIS 0.05 % ophthalmic emulsion     Vitamin D3 (CHOLECALCIFEROL) 25 mcg (1000 units) tablet     nitroGLYcerin (NITROSTAT) 0.4 MG sublingual tablet     phenazopyridine (AZO URINARY PAIN RELIEF) 95 MG tablet     tiZANidine (ZANAFLEX) 4 MG tablet     vitamin C (ASCORBIC ACID) 100 MG tablet     No current facility-administered medications for this visit.      Past Medical History:   Patient Active Problem List   Diagnosis     Chronic maxillary sinusitis     Esophageal reflux     Osteopenia     Dry eyes, bilateral     Irritable bowel syndrome with both constipation and diarrhea     Varicose veins of  both legs with edema     Essential hypertension     Vaginal stenosis     Lichen sclerosus     Past Medical History:   Diagnosis Date     Arthritis      Disorder of bone and cartilage, unspecified      Esophageal reflux      Essential hypertension 7/26/2022     Osteopenia 6/14    -1.5       CC No referring provider defined for this encounter. on close of this encounter.

## 2023-04-26 NOTE — Clinical Note
I think that the itching is possibly due to an underlying nerve firing.  I wonder if this is due to a compressed disc in the neck or upper back but I thought maybe you could do a work-up and may be some imaging.  I was not sure if I should send her to neurology or orthopedics or back to primary care so I thought she should start with you.  Please let me know if there is anything I can do to help  Cynthia Benz, [facility]

## 2023-05-30 ENCOUNTER — OFFICE VISIT (OUTPATIENT)
Dept: INTERNAL MEDICINE | Facility: CLINIC | Age: 73
End: 2023-05-30
Payer: COMMERCIAL

## 2023-05-30 VITALS
HEART RATE: 112 BPM | WEIGHT: 146 LBS | RESPIRATION RATE: 16 BRPM | DIASTOLIC BLOOD PRESSURE: 62 MMHG | BODY MASS INDEX: 24.32 KG/M2 | TEMPERATURE: 97.9 F | HEIGHT: 65 IN | SYSTOLIC BLOOD PRESSURE: 126 MMHG | OXYGEN SATURATION: 97 %

## 2023-05-30 DIAGNOSIS — K21.9 GASTROESOPHAGEAL REFLUX DISEASE, UNSPECIFIED WHETHER ESOPHAGITIS PRESENT: ICD-10-CM

## 2023-05-30 DIAGNOSIS — R13.10 DYSPHAGIA, UNSPECIFIED TYPE: ICD-10-CM

## 2023-05-30 DIAGNOSIS — L29.9 PRURITUS: ICD-10-CM

## 2023-05-30 DIAGNOSIS — R07.89 CHEST TIGHTNESS: ICD-10-CM

## 2023-05-30 LAB
ALBUMIN SERPL BCG-MCNC: 4.3 G/DL (ref 3.5–5.2)
ALP SERPL-CCNC: 76 U/L (ref 35–104)
ALT SERPL W P-5'-P-CCNC: 25 U/L (ref 10–35)
AST SERPL W P-5'-P-CCNC: 24 U/L (ref 10–35)
BILIRUB DIRECT SERPL-MCNC: <0.2 MG/DL (ref 0–0.3)
BILIRUB SERPL-MCNC: 0.2 MG/DL
PROT SERPL-MCNC: 6.6 G/DL (ref 6.4–8.3)
TSH SERPL DL<=0.005 MIU/L-ACNC: 0.77 UIU/ML (ref 0.3–4.2)

## 2023-05-30 PROCEDURE — 99215 OFFICE O/P EST HI 40 MIN: CPT | Performed by: INTERNAL MEDICINE

## 2023-05-30 PROCEDURE — 36415 COLL VENOUS BLD VENIPUNCTURE: CPT | Performed by: INTERNAL MEDICINE

## 2023-05-30 PROCEDURE — 80076 HEPATIC FUNCTION PANEL: CPT | Performed by: INTERNAL MEDICINE

## 2023-05-30 PROCEDURE — 84443 ASSAY THYROID STIM HORMONE: CPT | Performed by: INTERNAL MEDICINE

## 2023-05-30 RX ORDER — PANTOPRAZOLE SODIUM 40 MG/1
40 TABLET, DELAYED RELEASE ORAL DAILY
Qty: 30 TABLET | Refills: 1 | Status: ON HOLD | OUTPATIENT
Start: 2023-05-30 | End: 2023-07-21

## 2023-05-30 NOTE — PROGRESS NOTES
Assessment & Plan     (L29.9) Pruritus  Plan: Pruritus mainly on the face and scalp, patient has been using the hydrocortisone cream on face and ketoconazole on scalp as per recommendation from dermatology nurse practitioner, pt was advised to take Zyrtec daily as directed.explained clearly about the medication,insructions and side effects. check TSH with free T4 reflex, Hepatic panel (Albumin, ALT, AST, Bili, Alk Phos, TP), pt was told I will contact her after results and proceed accordingly.             (K21.9) Gastroesophageal reflux disease, unspecified whether esophagitis present  Plan:started on  pantoprazole (PROTONIX) 40 MG EC tablet daily as directed .explained clearly about the medication,insructions and side effects. Discussed raising the head of the bed 4 to 6 inches; avoiding chocolate, coffee, peppermint, fruit juices, tomatoes, greasy and spicy foods.     Ordered upper endoscopy adult GI  Referral - Procedure Only            (R13.10) Dysphagia, unspecified type  Plan: ordered upper endoscopy, Adult GI  Referral - Procedure Only,             (R07.89) Chest tightness  Plan: Exercise Stress Test - Adult.pt was told I will contact her after results and proceed accordingly.             Review of the result(s) of each unique test - LFT, TSH  Ordering of each unique test  Prescription drug management  44 minutes spent by me on the date of the encounter doing chart review, history and exam, documentation and further activities per the note        Andrae Burns MD  [facility] BETY Balbuena is a 72 year old, presenting for the following health issues:  Consult          History of Present Illness       Reason for visit:  Itchy face and head  Symptom onset:  More than a month  Symptoms include:  Itching  Symptom intensity:  Moderate  Symptom progression:  Staying the same  Had these symptoms before:  No  What makes it worse:  No  What makes it  better:  No    She eats 0-1 servings of fruits and vegetables daily.She consumes 0 sweetened beverage(s) daily.She exercises with enough effort to increase her heart rate 9 or less minutes per day.  She exercises with enough effort to increase her heart rate 3 or less days per week.   She is taking medications regularly.       Pt is a 72 year old female who is seen here to day with the complaints of itching mainly on her face/bilateral cheeks and also on the scalp since 2 months.  Patient has not used any new detergent, lotion, shampoo, medications before this started.  Patient was seen by dermatology nurse practitioner and was prescribed ketoconazole without much symptom relief.     Patient also complains of heartburn, reflux symptoms which has been going on for few years but has increased in the past 2 to 3 months, currently taking omeprazole on and off without much symptom relief.  Patient states that she has had history of endoscopies in the past.    Patient also complains of having difficulty swallowing mainly for solids, feels like food gets stuck in the midesophagus.  No nausea vomiting.  Patient has history of esophageal spasms and takes nitro as needed     Patient also complains of chest tightness mainly with exertion/walking.  No radiation to left arm or jaw, no palpitations, no dizziness, no diaphoresis, usually resolves after resting      Past Medical History:   Diagnosis Date     Arthritis      Disorder of bone and cartilage, unspecified      Esophageal reflux      Essential hypertension 7/26/2022     Osteopenia 6/14    -1.5       Current Outpatient Medications   Medication Sig Dispense Refill     Cetirizine-Pseudoephedrine (ZYRTEC-D PO) PRN       clobetasol (TEMOVATE) 0.05 % external ointment Apply topically.  Use daily during a flare, apply twice weekly between flares for prevention. 45 g 3     Cranberry 250 MG CAPS Take by mouth 2 times daily       Cranberry-Vitamin C 250-60 MG CAPS Take 2 capsules  by mouth       estradiol (ESTRACE VAGINAL) 0.1 MG/GM vaginal cream Apply a small amount to the opening to the vagina twice a week. 42.5 g 6     fluticasone (FLONASE) 50 MCG/ACT nasal spray Spray 1 spray into both nostrils daily       IBUPROFEN 200 MG OR TABS        lisinopril (ZESTRIL) 10 MG tablet Take 1 tablet (10 mg) by mouth daily 90 tablet 1     Melatonin 10 MG TABS tablet Take 10 mg by mouth nightly as needed for sleep       OMEPRAZOLE PO PRN       pantoprazole (PROTONIX) 40 MG EC tablet Take 1 tablet (40 mg) by mouth daily 30 tablet 1     phenazopyridine (AZO URINARY PAIN RELIEF) 95 MG tablet Take 190 mg by mouth 3 times daily       polyethylene glycol (MIRALAX/GLYCOLAX) powder Take 1 capful by mouth as needed for constipation       polyethylene glycol-propylene glycol (SYSTANE ULTRA) 0.4-0.3 % SOLN ophthalmic solution Place 1 drop into both eyes every hour as needed for dry eyes       RESTASIS 0.05 % ophthalmic emulsion INSTILL ONE DROP IN EACH EYE TWO TIMES A DAY  3     tiZANidine (ZANAFLEX) 4 MG tablet Take 1 tablet (4 mg) by mouth nightly as needed for muscle spasms 30 tablet 0     vitamin C (ASCORBIC ACID) 100 MG tablet Take 100 mg by mouth 3 times daily       Vitamin D3 (CHOLECALCIFEROL) 25 mcg (1000 units) tablet Take by mouth daily       fluocinonide (LIDEX) 0.05 % external solution Apply topically 2 times daily For up to 3 weeks for itching. 60 mL 1     ketoconazole (NIZORAL) 2 % external shampoo Use every 1-2 days when flared. Leave in few minutes before rinsing. Use twice weekly to prevent flares. 120 mL 11     nitroGLYcerin (NITROSTAT) 0.4 MG sublingual tablet Place 1 tablet (0.4 mg) under the tongue every 5 minutes as needed for chest pain If you are still having symptoms after 3 doses (15 minutes) call 911. (Patient not taking: Reported on 5/30/2023) 25 tablet 0         Review of Systems   CONSTITUTIONAL: NEGATIVE for fever, chills, change in weight  INTEGUMENTARY/SKIN: itching  ENT/MOUTH:  "NEGATIVE for ear, mouth and throat problems  RESP: NEGATIVE for significant cough or SOB  CV: chest tightness with exertion  GI: heartburn, difficulty swallowing           Objective    /62   Pulse 112   Temp 97.9  F (36.6  C) (Tympanic)   Resp 16   Ht 1.651 m (5' 5\")   Wt 66.2 kg (146 lb)   SpO2 97%   BMI 24.30 kg/m    Body mass index is 24.3 kg/m .  Physical Exam   GENERAL: alert and no distress  EYES: Eyes grossly normal to inspection, PERRL and conjunctivae and sclerae normal  RESP: lungs clear to auscultation - no rales, rhonchi or wheezes  CV: regular rate and rhythm, normal S1 S2,    ABDOMEN: soft,  Mild tenderness in epigastric area and bowel sounds normal  MS: no gross musculoskeletal defects noted, no edema  PSYCH: mentation appears normal, affect normal/bright              "

## 2023-05-30 NOTE — NURSING NOTE
"/62   Pulse 112   Temp 97.9  F (36.6  C) (Tympanic)   Resp 16   Ht 1.651 m (5' 5\")   Wt 66.2 kg (146 lb)   SpO2 97%   BMI 24.30 kg/m      "

## 2023-05-31 ENCOUNTER — TELEPHONE (OUTPATIENT)
Dept: GASTROENTEROLOGY | Facility: CLINIC | Age: 73
End: 2023-05-31
Payer: COMMERCIAL

## 2023-05-31 NOTE — TELEPHONE ENCOUNTER
Screening Questions  BLUE  KIND OF PREP RED  LOCATION [review exclusion criteria] GREEN  SEDATION TYPE        y Are you active on mychart?       Andrae Burns MD in [facility] Ordering/Referring Provider?        MEDICA What type of coverage do you have?      n Have you had a positive covid test in the last 14 days?     24.3 1. BMI  [BMI 40+ - review exclusion criteria& smart-phrase document]    y  2. Are you able to give consent for your medical care? [IF NO,RN REVIEW]          n  3. Are you taking any prescription pain medications on a routine schedule   (ex narcotics: oxycodone, roxicodone, oxycontin,  and percocet)? [RN Review]        n  3a. EXTENDED PREP What kind of prescription?     n 4. Do you have any chemical dependencies such as alcohol, street drugs, or methadone?        **If yes 3- 5 , please schedule with MAC sedation.**          IF YES TO ANY 6 - 10 - HOSPITAL SETTING ONLY.     n 6.   Do you need assistance transferring?     n 7.   Have you had a heart or lung transplant?    n 8.   Are you currently on dialysis?   n 9.   Do you use daily home oxygen?   n- pt has it but does not take it and has not for a few years  10. Do you take nitroglycerin?   10a. n If yes, how often?     n 11. Are you currently pregnant?    11a. n If yes, how many weeks? [ Greater than 12 weeks, OR NEEDED]    n 12. Do you have Pulmonary Hypertension? *NEED PAC APPT AT [facility] w/ MAC*     n 13. [review exclusion criteria]  Do you have any implantable devices in your body (pacemaker, defib, LVAD)?    n 14. In the past 6 months, have you had any heart related issues including cardiomyopathy or heart attack?     14a. n If yes, did it require cardiac stenting if so when?     n 15. Have you had a stroke or Transient ischemic attack (TIA - aka  mini stroke ) within 6 months?      n 16. Do you have mod to severe Obstructive Sleep Apnea?  [Hospital only]    n 17. Do you have SEVERE AND UNCONTROLLED asthma? *NEED PAC APPT AT [facility]  "w/MAC*     18.Do you take blood thinners?  No    n 19. Do you take any of the following medications?    nPhentermine    nOzempic    nWegovy (Semaglutide)      19a. If yes, \"Hold for 7 days before procedure.  Please consult your prescribing provider if you have questions about holding this medication.\"     n  20. Do you have chronic kidney disease?      n  21. Do you have a diagnosis of diabetes?     n  22. On a regular basis do you go 3-5 days between bowel movements?      23. Preferred LOCAL Pharmacy for Pre Prescription         [email or web address]     - CLOSING REMINDERS -    You will receive a call from a Nurse to review instructions and health history.  This assessment must be completed prior to your procedure.  Failure to complete the Nurse assessment may result in the procedure being cancelled.      On the day of your procedure, please designatean adult(s) who can drive you home stay with you for the next 24 hours. The medicines used in the exam will make you sleepy. You will not be able to drive.      You cannot take public transportation, ride share services, or non-medical taxi service without a responsible caregiver.  Medical transport services are allowed with the requirement that a responsible caregiver will receive you at your destination.  We require that drivers and caregivers are confirmed prior to your procedure.      - SCHEDULING DETAILS -  n Hospital Setting Required & If yes, what is the exclusion?   Kaylyn  Surgeon    07/21/2023  Date of Procedure  Upper Endoscopy [EGD]  Type of Procedure Scheduled  [facility] Location   MIRALAX GATORADE WITHOUT MAGNEISUM CITRATE Which Colonoscopy Prep was Sent?     moderate Sedation Type     n PAC / Pre-op Required                 "

## 2023-06-02 ENCOUNTER — HOSPITAL ENCOUNTER (OUTPATIENT)
Dept: CARDIOLOGY | Facility: CLINIC | Age: 73
Discharge: HOME | End: 2023-06-02
Attending: INTERNAL MEDICINE | Admitting: INTERNAL MEDICINE
Payer: COMMERCIAL

## 2023-06-02 ENCOUNTER — TELEPHONE (OUTPATIENT)
Dept: CARDIOLOGY | Facility: CLINIC | Age: 73
End: 2023-06-02

## 2023-06-02 DIAGNOSIS — R07.89 CHEST TIGHTNESS: ICD-10-CM

## 2023-06-02 DIAGNOSIS — R07.89 CHEST TIGHTNESS: Primary | ICD-10-CM

## 2023-06-02 PROCEDURE — 93018 CV STRESS TEST I&R ONLY: CPT | Performed by: INTERNAL MEDICINE

## 2023-06-02 PROCEDURE — 93017 CV STRESS TEST TRACING ONLY: CPT

## 2023-06-02 PROCEDURE — 93016 CV STRESS TEST SUPVJ ONLY: CPT | Performed by: INTERNAL MEDICINE

## 2023-06-02 RX ORDER — METOPROLOL TARTRATE 50 MG
TABLET ORAL
Qty: 2 TABLET | Refills: 0 | Status: ON HOLD | OUTPATIENT
Start: 2023-06-02 | End: 2023-07-21

## 2023-06-02 NOTE — TELEPHONE ENCOUNTER
----- Message from Gaetano Arana MD sent at 6/2/2023 11:34 AM CDT -----  This patient presented today at [facility] for a treadmill stress test.  She had some chest pain, but normal EKG.  I spoke with her and would like to set her up for a coronary CTA.  Can you help get this ordered?  She will need Lopressor 50 mg the night before and 50 mg the morning of the scan.  She will be out of town June 21 through July 5.  If CTA could be done before that it would be ideal, but it could be after.  Can you also schedule a follow-up 1 or 2 weeks after CTA?  It could be with me under vascular time or MIREILLE.    Von

## 2023-06-02 NOTE — PROGRESS NOTES
Treadmill stress test completed. Contacted Dr. Arana regarding patient's symptoms of chest pain and BRAUN. Dr. Arana spoke with the patient. She was given the ok to leave.

## 2023-06-02 NOTE — PROGRESS NOTES
Cardiology note    72-year-old female with no cardiac history presenting for outpatient treadmill stress test today.  She reports several months of exertional shortness of breath and chest tightness.  She reports having COVID last fall for the second time.  She has no breast type angina.    Today on the treadmill she had 3/10 chest tightness, EKG was normal.  Symptoms resolved with rest.    Discussed test with patient and limitation of EKG only stress test.  She is agreeable to doing a coronary CTA with cardiology follow-up.    Gaetano Arana MD  Cardiology - [facility]  Pager: [phone]  Text Page  June 2, 2023

## 2023-06-05 NOTE — TELEPHONE ENCOUNTER
Future Appointments   Date Time Provider Department Center   6/8/2023  1:00 PM SCICT1 SHCVCT CVIMG   7/12/2023  3:30 PM Tiny Bolton APRN CNP [facility] PSA CLIN   9/12/2023 11:15 AM Yamileth Gallegos MD [facility] RI   9/25/2023 10:05 AM RHBCMA1 [facility]IC [facility] BC BVIL   9/25/2023 11:00 AM Andrae Burns MD [facility]     Fara VALVERDE, SAI  06/05/23 at 11:26 AM

## 2023-06-08 DIAGNOSIS — R07.89 CHEST TIGHTNESS: Primary | ICD-10-CM

## 2023-06-08 RX ORDER — METOPROLOL TARTRATE 50 MG
TABLET ORAL
Qty: 2 TABLET | Refills: 0 | Status: ON HOLD | OUTPATIENT
Start: 2023-06-08 | End: 2023-07-21

## 2023-06-08 NOTE — PROGRESS NOTES
Received message from scheduling, patient scheduled for CT angiogram today. Appointment needed to be rescheduled due to scanner being down for the day. New order for Lopressor placed.   Fara VALVERDE RN  06/08/23 at 10:07 AM

## 2023-06-15 ENCOUNTER — HOSPITAL ENCOUNTER (OUTPATIENT)
Dept: CARDIOLOGY | Facility: CLINIC | Age: 73
Discharge: HOME | End: 2023-06-15
Attending: INTERNAL MEDICINE | Admitting: INTERNAL MEDICINE
Payer: COMMERCIAL

## 2023-06-15 VITALS — RESPIRATION RATE: 16 BRPM | DIASTOLIC BLOOD PRESSURE: 67 MMHG | HEART RATE: 57 BPM | SYSTOLIC BLOOD PRESSURE: 114 MMHG

## 2023-06-15 DIAGNOSIS — R07.89 CHEST TIGHTNESS: ICD-10-CM

## 2023-06-15 LAB
CREAT BLD-MCNC: 0.8 MG/DL (ref 0.5–1)
GFR SERPL CREATININE-BSD FRML MDRD: >60 ML/MIN/1.73M2

## 2023-06-15 PROCEDURE — 82565 ASSAY OF CREATININE: CPT

## 2023-06-15 PROCEDURE — 75574 CT ANGIO HRT W/3D IMAGE: CPT | Mod: 26 | Performed by: INTERNAL MEDICINE

## 2023-06-15 PROCEDURE — 250N000013 HC RX MED GY IP 250 OP 250 PS 637: Performed by: INTERNAL MEDICINE

## 2023-06-15 PROCEDURE — 250N000011 HC RX IP 250 OP 636: Performed by: INTERNAL MEDICINE

## 2023-06-15 PROCEDURE — 75574 CT ANGIO HRT W/3D IMAGE: CPT

## 2023-06-15 RX ORDER — ACYCLOVIR 200 MG/1
0-1 CAPSULE ORAL
Status: DISCONTINUED | OUTPATIENT
Start: 2023-06-15 | End: 2023-06-16 | Stop reason: HOSPADM

## 2023-06-15 RX ORDER — DILTIAZEM HCL 60 MG
120 TABLET ORAL
Status: DISCONTINUED | OUTPATIENT
Start: 2023-06-15 | End: 2023-06-16 | Stop reason: HOSPADM

## 2023-06-15 RX ORDER — NITROGLYCERIN 0.4 MG/1
0.4 TABLET SUBLINGUAL
Status: DISCONTINUED | OUTPATIENT
Start: 2023-06-15 | End: 2023-06-16 | Stop reason: HOSPADM

## 2023-06-15 RX ORDER — DILTIAZEM HYDROCHLORIDE 5 MG/ML
10-15 INJECTION INTRAVENOUS
Status: DISCONTINUED | OUTPATIENT
Start: 2023-06-15 | End: 2023-06-16 | Stop reason: HOSPADM

## 2023-06-15 RX ORDER — METHYLPREDNISOLONE SODIUM SUCCINATE 125 MG/2ML
125 INJECTION, POWDER, LYOPHILIZED, FOR SOLUTION INTRAMUSCULAR; INTRAVENOUS
Status: DISCONTINUED | OUTPATIENT
Start: 2023-06-15 | End: 2023-06-16 | Stop reason: HOSPADM

## 2023-06-15 RX ORDER — METOPROLOL TARTRATE 1 MG/ML
5-15 INJECTION, SOLUTION INTRAVENOUS
Status: DISCONTINUED | OUTPATIENT
Start: 2023-06-15 | End: 2023-06-16 | Stop reason: HOSPADM

## 2023-06-15 RX ORDER — METOPROLOL TARTRATE 25 MG/1
25-100 TABLET, FILM COATED ORAL
Status: DISCONTINUED | OUTPATIENT
Start: 2023-06-15 | End: 2023-06-16 | Stop reason: HOSPADM

## 2023-06-15 RX ORDER — IVABRADINE 5 MG/1
5-15 TABLET, FILM COATED ORAL
Status: DISCONTINUED | OUTPATIENT
Start: 2023-06-15 | End: 2023-06-16 | Stop reason: HOSPADM

## 2023-06-15 RX ORDER — ONDANSETRON 2 MG/ML
4 INJECTION INTRAMUSCULAR; INTRAVENOUS
Status: DISCONTINUED | OUTPATIENT
Start: 2023-06-15 | End: 2023-06-16 | Stop reason: HOSPADM

## 2023-06-15 RX ORDER — DIPHENHYDRAMINE HYDROCHLORIDE 50 MG/ML
25-50 INJECTION INTRAMUSCULAR; INTRAVENOUS
Status: DISCONTINUED | OUTPATIENT
Start: 2023-06-15 | End: 2023-06-16 | Stop reason: HOSPADM

## 2023-06-15 RX ORDER — IOPAMIDOL 755 MG/ML
500 INJECTION, SOLUTION INTRAVASCULAR ONCE
Status: COMPLETED | OUTPATIENT
Start: 2023-06-15 | End: 2023-06-15

## 2023-06-15 RX ORDER — DIPHENHYDRAMINE HCL 25 MG
25 CAPSULE ORAL
Status: DISCONTINUED | OUTPATIENT
Start: 2023-06-15 | End: 2023-06-16 | Stop reason: HOSPADM

## 2023-06-15 RX ADMIN — NITROGLYCERIN 0.4 MG: 0.4 TABLET SUBLINGUAL at 09:49

## 2023-06-15 RX ADMIN — IOPAMIDOL 110 ML: 755 INJECTION, SOLUTION INTRAVENOUS at 09:55

## 2023-07-21 ENCOUNTER — HOSPITAL ENCOUNTER (OUTPATIENT)
Facility: CLINIC | Age: 73
Discharge: HOME | End: 2023-07-21
Attending: INTERNAL MEDICINE | Admitting: INTERNAL MEDICINE
Payer: COMMERCIAL

## 2023-07-21 VITALS
WEIGHT: 146 LBS | HEIGHT: 65 IN | HEART RATE: 73 BPM | RESPIRATION RATE: 16 BRPM | DIASTOLIC BLOOD PRESSURE: 63 MMHG | SYSTOLIC BLOOD PRESSURE: 111 MMHG | BODY MASS INDEX: 24.32 KG/M2 | OXYGEN SATURATION: 97 %

## 2023-07-21 LAB — UPPER GI ENDOSCOPY: NORMAL

## 2023-07-21 PROCEDURE — 88305 TISSUE EXAM BY PATHOLOGIST: CPT | Mod: TC | Performed by: INTERNAL MEDICINE

## 2023-07-21 PROCEDURE — 250N000011 HC RX IP 250 OP 636: Performed by: INTERNAL MEDICINE

## 2023-07-21 PROCEDURE — 250N000009 HC RX 250: Performed by: INTERNAL MEDICINE

## 2023-07-21 PROCEDURE — 88305 TISSUE EXAM BY PATHOLOGIST: CPT | Mod: 26 | Performed by: PATHOLOGY

## 2023-07-21 PROCEDURE — 43239 EGD BIOPSY SINGLE/MULTIPLE: CPT | Performed by: INTERNAL MEDICINE

## 2023-07-21 PROCEDURE — 999N000099 HC STATISTIC MODERATE SEDATION < 10 MIN: Performed by: INTERNAL MEDICINE

## 2023-07-21 RX ORDER — FLUMAZENIL 0.1 MG/ML
0.2 INJECTION, SOLUTION INTRAVENOUS
Status: DISCONTINUED | OUTPATIENT
Start: 2023-07-21 | End: 2023-07-21 | Stop reason: HOSPADM

## 2023-07-21 RX ORDER — ATROPINE SULFATE 0.1 MG/ML
1 INJECTION INTRAVENOUS
Status: DISCONTINUED | OUTPATIENT
Start: 2023-07-21 | End: 2023-07-21 | Stop reason: HOSPADM

## 2023-07-21 RX ORDER — NALOXONE HYDROCHLORIDE 0.4 MG/ML
0.2 INJECTION, SOLUTION INTRAMUSCULAR; INTRAVENOUS; SUBCUTANEOUS
Status: DISCONTINUED | OUTPATIENT
Start: 2023-07-21 | End: 2023-07-21 | Stop reason: HOSPADM

## 2023-07-21 RX ORDER — LIDOCAINE 40 MG/G
CREAM TOPICAL
Status: DISCONTINUED | OUTPATIENT
Start: 2023-07-21 | End: 2023-07-21 | Stop reason: HOSPADM

## 2023-07-21 RX ORDER — NALOXONE HYDROCHLORIDE 0.4 MG/ML
0.4 INJECTION, SOLUTION INTRAMUSCULAR; INTRAVENOUS; SUBCUTANEOUS
Status: DISCONTINUED | OUTPATIENT
Start: 2023-07-21 | End: 2023-07-21 | Stop reason: HOSPADM

## 2023-07-21 RX ORDER — ONDANSETRON 2 MG/ML
4 INJECTION INTRAMUSCULAR; INTRAVENOUS EVERY 6 HOURS PRN
Status: DISCONTINUED | OUTPATIENT
Start: 2023-07-21 | End: 2023-07-21 | Stop reason: HOSPADM

## 2023-07-21 RX ORDER — PROCHLORPERAZINE MALEATE 5 MG
5 TABLET ORAL EVERY 6 HOURS PRN
Status: DISCONTINUED | OUTPATIENT
Start: 2023-07-21 | End: 2023-07-21 | Stop reason: HOSPADM

## 2023-07-21 RX ORDER — EPINEPHRINE 1 MG/ML
0.1 INJECTION, SOLUTION INTRAMUSCULAR; SUBCUTANEOUS
Status: DISCONTINUED | OUTPATIENT
Start: 2023-07-21 | End: 2023-07-21 | Stop reason: HOSPADM

## 2023-07-21 RX ORDER — SIMETHICONE 40MG/0.6ML
133 SUSPENSION, DROPS(FINAL DOSAGE FORM)(ML) ORAL
Status: DISCONTINUED | OUTPATIENT
Start: 2023-07-21 | End: 2023-07-21 | Stop reason: HOSPADM

## 2023-07-21 RX ORDER — DIPHENHYDRAMINE HYDROCHLORIDE 50 MG/ML
25-50 INJECTION INTRAMUSCULAR; INTRAVENOUS
Status: DISCONTINUED | OUTPATIENT
Start: 2023-07-21 | End: 2023-07-21 | Stop reason: HOSPADM

## 2023-07-21 RX ORDER — ONDANSETRON 4 MG/1
4 TABLET, ORALLY DISINTEGRATING ORAL EVERY 6 HOURS PRN
Status: DISCONTINUED | OUTPATIENT
Start: 2023-07-21 | End: 2023-07-21 | Stop reason: HOSPADM

## 2023-07-21 RX ORDER — FENTANYL CITRATE 50 UG/ML
50-100 INJECTION, SOLUTION INTRAMUSCULAR; INTRAVENOUS EVERY 5 MIN PRN
Status: DISCONTINUED | OUTPATIENT
Start: 2023-07-21 | End: 2023-07-21 | Stop reason: HOSPADM

## 2023-07-21 RX ORDER — ONDANSETRON 2 MG/ML
4 INJECTION INTRAMUSCULAR; INTRAVENOUS
Status: DISCONTINUED | OUTPATIENT
Start: 2023-07-21 | End: 2023-07-21 | Stop reason: HOSPADM

## 2023-07-21 RX ADMIN — MIDAZOLAM 2 MG: 1 INJECTION INTRAMUSCULAR; INTRAVENOUS at 13:27

## 2023-07-21 RX ADMIN — FENTANYL CITRATE 100 MCG: 50 INJECTION, SOLUTION INTRAMUSCULAR; INTRAVENOUS at 13:27

## 2023-07-21 RX ADMIN — TOPICAL ANESTHETIC 0.5 ML: 200 SPRAY DENTAL; PERIODONTAL at 12:37

## 2023-07-21 ASSESSMENT — ACTIVITIES OF DAILY LIVING (ADL): ADLS_ACUITY_SCORE: 35

## 2023-07-21 NOTE — H&P
Pre-Endoscopy History and Physical     Nita Trevizo MRN# [id number]   YOB: 1950 Age: 72 year old     Date of Procedure: 7/21/2023  Primary care provider: Andrae Burns  Type of Endoscopy: Gastroscopy with possible biopsy, possible dilation  Reason for Procedure: reflux  Type of Anesthesia Anticipated: Conscious Sedation    HPI:    Nita is a 72 year old female who will be undergoing the above procedure.      A history and physical has been performed. The patient's medications and allergies have been reviewed. The risks and benefits of the procedure and the sedation options and risks were discussed with the patient.  All questions were answered and informed consent was obtained.      She denies a personal or family history of anesthesia complications or bleeding disorders.     Patient Active Problem List   Diagnosis     Chronic maxillary sinusitis     Esophageal reflux     Osteopenia     Dry eyes, bilateral     Irritable bowel syndrome with both constipation and diarrhea     Varicose veins of both legs with edema     Essential hypertension     Vaginal stenosis     Lichen sclerosus        Past Medical History:   Diagnosis Date     Arthritis      Disorder of bone and cartilage, unspecified      Esophageal reflux      Essential hypertension 07/26/2022     Irritable bowel syndrome      Osteopenia 06/2014    -1.5        Past Surgical History:   Procedure Laterality Date     ABDOMEN SURGERY       BLADDER SURGERY       COLONOSCOPY       COLONOSCOPY N/A 10/16/2020    Procedure: COLONOSCOPY with random colon biopsies;  Surgeon: Louie Patiño MD;  Location:  GI     CYSTOSCOPY       GENITOURINARY SURGERY       HC REMOVAL GALLBLADDER       HC REMOVE TONSILS/ADENOIDS,<13 Y/O       ORTHOPEDIC SURGERY      wrist 2017 [facility]      VASCULAR SURGERY       ZZC APPENDECTOMY       ZZC LIGATE FALLOPIAN TUBE       ZZC NONSPECIFIC PROCEDURE      vein stripping x 3     ZZC TOTAL ABDOM HYSTERECTOMY       for fibroids with BSO       Social History     Tobacco Use     Smoking status: Never     Smokeless tobacco: Never   Substance Use Topics     Alcohol use: No       Family History   Problem Relation Age of Onset     Diabetes Mother         born 1928     Hypertension Mother      Obesity Mother      Cancer Paternal Grandfather         skin     Colon Cancer Maternal Grandfather         had in his late 80s     Substance Abuse Brother         hep c  .     Osteoporosis Paternal Grandmother        Prior to Admission medications    Medication Sig Start Date End Date Taking? Authorizing Provider   Cetirizine-Pseudoephedrine (ZYRTEC-D PO) PRN   Yes Reported, Patient   Cranberry 250 MG CAPS Take by mouth 2 times daily   Yes Reported, Patient   fluticasone (FLONASE) 50 MCG/ACT nasal spray Spray 1 spray into both nostrils daily   Yes Reported, Patient   IBUPROFEN 200 MG OR TABS    Yes Reported, Patient   lisinopril (ZESTRIL) 10 MG tablet Take 1 tablet (10 mg) by mouth daily 3/16/23  Yes Andrae Burns MD   Melatonin 10 MG TABS tablet Take 10 mg by mouth nightly as needed for sleep   Yes Reported, Patient   polyethylene glycol (MIRALAX/GLYCOLAX) powder Take 1 capful by mouth as needed for constipation   Yes Reported, Patient   RESTASIS 0.05 % ophthalmic emulsion INSTILL ONE DROP IN EACH EYE TWO TIMES A DAY 10/16/19  Yes Reported, Patient   tiZANidine (ZANAFLEX) 4 MG tablet Take 1 tablet (4 mg) by mouth nightly as needed for muscle spasms 22  Yes Elsa Stevens MD   clobetasol (TEMOVATE) 0.05 % external ointment Apply topically.  Use daily during a flare, apply twice weekly between flares for prevention. 22   Fiona Bertrand MD   estradiol (ESTRACE VAGINAL) 0.1 MG/GM vaginal cream Apply a small amount to the opening to the vagina twice a week. 23   Yamileth Gallegos MD   nitroGLYcerin (NITROSTAT) 0.4 MG sublingual tablet Place 1 tablet (0.4 mg) under the tongue every 5 minutes as  "needed for chest pain If you are still having symptoms after 3 doses (15 minutes) call 911.  Patient not taking: Reported on 5/30/2023 9/13/21   Andrae Burns MD   phenazopyridine (AZO URINARY PAIN RELIEF) 95 MG tablet Take 190 mg by mouth 3 times daily    Reported, Patient   polyethylene glycol-propylene glycol (SYSTANE ULTRA) 0.4-0.3 % SOLN ophthalmic solution Place 1 drop into both eyes every hour as needed for dry eyes    Reported, Patient       Allergies   Allergen Reactions     Ciprofloxacin      Rash, eyelids red puffy & itchy     Codeine      Esophageal spasms        REVIEW OF SYSTEMS:   5 point ROS negative except as noted above in HPI, including Gen., Resp., CV, GI &  system review.    PHYSICAL EXAM:   /79   Pulse 79   Resp 15   Ht 1.651 m (5' 5\")   Wt 66.2 kg (146 lb)   SpO2 97%   BMI 24.30 kg/m   Estimated body mass index is 24.3 kg/m  as calculated from the following:    Height as of this encounter: 1.651 m (5' 5\").    Weight as of this encounter: 66.2 kg (146 lb).   GENERAL APPEARANCE: alert, and oriented  MENTAL STATUS: alert  AIRWAY EXAM: Mallampatti Class I (visualization of the soft palate, fauces, uvula, anterior and posterior pillars)  RESP: lungs clear to auscultation - no rales, rhonchi or wheezes  CV: regular rates and rhythm  DIAGNOSTICS:    Not indicated    IMPRESSION   ASA Class 2 - Mild systemic disease    PLAN:   Plan for Gastroscopy with possible biopsy, possible dilation. We discussed the risks, benefits and alternatives and the patient wished to proceed.    The above has been forwarded to the consulting provider.      Signed Electronically by: Louie Patiño MD  July 21, 2023          "

## 2023-07-24 LAB
PATH REPORT.COMMENTS IMP SPEC: NORMAL
PATH REPORT.COMMENTS IMP SPEC: NORMAL
PATH REPORT.FINAL DX SPEC: NORMAL
PATH REPORT.GROSS SPEC: NORMAL
PATH REPORT.MICROSCOPIC SPEC OTHER STN: NORMAL
PATH REPORT.RELEVANT HX SPEC: NORMAL
PHOTO IMAGE: NORMAL

## 2023-09-12 ENCOUNTER — OFFICE VISIT (OUTPATIENT)
Dept: OBGYN | Facility: CLINIC | Age: 73
End: 2023-09-12
Payer: COMMERCIAL

## 2023-09-12 VITALS — DIASTOLIC BLOOD PRESSURE: 72 MMHG | SYSTOLIC BLOOD PRESSURE: 114 MMHG

## 2023-09-12 DIAGNOSIS — N95.2 ATROPHIC VAGINITIS: ICD-10-CM

## 2023-09-12 DIAGNOSIS — L90.0 LICHEN SCLEROSUS: Primary | ICD-10-CM

## 2023-09-12 PROCEDURE — 99213 OFFICE O/P EST LOW 20 MIN: CPT | Performed by: OBSTETRICS & GYNECOLOGY

## 2023-09-12 NOTE — NURSING NOTE
"Chief Complaint   Patient presents with    Recheck Medication     Clobetsol and estradiol follow up, using 2x per week, doing well       Initial /72  Estimated body mass index is 24.3 kg/m  as calculated from the following:    Height as of 23: 1.651 m (5' 5\").    Weight as of 23: 66.2 kg (146 lb).  BP completed using cuff size: regular    Questioned patient about current smoking habits.  Pt. has never smoked.          The following HM Due: NONE    "

## 2023-09-12 NOTE — PATIENT INSTRUCTIONS
Use clobetasol ointment to twice a week to prevent a recurrence of the lichen sclerosus. If your symptoms return, I would ask you to increase the use of the ointment again to twice a day for 7-10 days, then decrease back to twice a week. If this is not helpful, or if you notice new symptoms or sores/lesions, please make an appointment as we will want to see you back for an exam. Otherwise, we need to see you once a year.     We are happy to see you any time for new or changing symptoms.

## 2023-09-12 NOTE — PROGRESS NOTES
SUBJECTIVE:                                                   Nita Trevizo is a 73 year old female who presents to clinic today to follow up for 6 month follow up for lichen sclerosus. Please see my last note for complete details.    Today, having no symptoms.  Clobetasol use twice per week. Vaginal estrogen twice a week.  New symptoms?: none      Problem list and histories reviewed & adjusted, as indicated.  Additional history: as documented.    Patient Active Problem List   Diagnosis     Chronic maxillary sinusitis     Esophageal reflux     Osteopenia     Dry eyes, bilateral     Irritable bowel syndrome with both constipation and diarrhea     Varicose veins of both legs with edema     Essential hypertension     Vaginal stenosis     Lichen sclerosus     Past Surgical History:   Procedure Laterality Date     ABDOMEN SURGERY       BLADDER SURGERY       COLONOSCOPY       COLONOSCOPY N/A 10/16/2020    Procedure: COLONOSCOPY with random colon biopsies;  Surgeon: Louie Patiño MD;  Location:  GI     CYSTOSCOPY       ESOPHAGOSCOPY, GASTROSCOPY, DUODENOSCOPY (EGD), COMBINED N/A 2023    Procedure: Esophagoscopy, gastroscopy, duodenoscopy (EGD), with biopsies;  Surgeon: Louie Patiño MD;  Location:  GI     GENITOURINARY SURGERY       HC REMOVAL GALLBLADDER       HC REMOVE TONSILS/ADENOIDS,<11 Y/O       ORTHOPEDIC SURGERY      wrist 2017 [facility]      VASCULAR SURGERY       ZZC APPENDECTOMY       ZZC LIGATE FALLOPIAN TUBE       ZZC NONSPECIFIC PROCEDURE      vein stripping x 3     ZZC TOTAL ABDOM HYSTERECTOMY      for fibroids with BSO      Social History     Tobacco Use     Smoking status: Never     Smokeless tobacco: Never   Substance Use Topics     Alcohol use: No      Problem (# of Occurrences) Relation (Name,Age of Onset)    Substance Abuse (1) Brother (andrew): hep c  .    Cancer (1) Paternal Grandfather: skin    Diabetes (1) Mother (Stefanie): born 192    Hypertension (1) Mother (Stefanie)     Osteoporosis (1) Paternal Grandmother (alberto)    Obesity (1) Mother (Stefanie)    Colon Cancer (1) Maternal Grandfather (brit): had in his late 80s              Cetirizine-Pseudoephedrine (ZYRTEC-D PO), PRN  clobetasol (TEMOVATE) 0.05 % external ointment, Apply topically.  Use daily during a flare, apply twice weekly between flares for prevention.  estradiol (ESTRACE VAGINAL) 0.1 MG/GM vaginal cream, Apply a small amount to the opening to the vagina twice a week.  fluticasone (FLONASE) 50 MCG/ACT nasal spray, Spray 1 spray into both nostrils daily  lisinopril (ZESTRIL) 10 MG tablet, Take 1 tablet (10 mg) by mouth daily  Melatonin 10 MG TABS tablet, Take 10 mg by mouth nightly as needed for sleep  polyethylene glycol (MIRALAX/GLYCOLAX) powder, Take 1 capful by mouth as needed for constipation  polyethylene glycol-propylene glycol (SYSTANE ULTRA) 0.4-0.3 % SOLN ophthalmic solution, Place 1 drop into both eyes every hour as needed for dry eyes  RESTASIS 0.05 % ophthalmic emulsion, INSTILL ONE DROP IN EACH EYE TWO TIMES A DAY  tiZANidine (ZANAFLEX) 4 MG tablet, Take 1 tablet (4 mg) by mouth nightly as needed for muscle spasms  Cranberry 250 MG CAPS, Take by mouth 2 times daily  IBUPROFEN 200 MG OR TABS,   nitroGLYcerin (NITROSTAT) 0.4 MG sublingual tablet, Place 1 tablet (0.4 mg) under the tongue every 5 minutes as needed for chest pain If you are still having symptoms after 3 doses (15 minutes) call 911. (Patient not taking: Reported on 5/30/2023)  phenazopyridine (AZO URINARY PAIN RELIEF) 95 MG tablet, Take 190 mg by mouth 3 times daily    No current facility-administered medications on file prior to visit.    Allergies   Allergen Reactions     Ciprofloxacin      Rash, eyelids red puffy & itchy     Codeine      Esophageal spasms       ROS:  Negative except as noted in HPI.    OBJECTIVE:     Mons pubis Normal   Groin Normal   Labia majora Normal   Perineum Abnormal: thinning consistent with her lichen sclerosus and  atrophic vaginitis    Anus Normal   Labia minora Abnormal: scarred, obliterated by lichen sclerosus: no change from prior exam   Prepuce Abnormal: scarring consistent with prior exam   Clitoris Abnormal: see above.   Intralabial folds Abnormal: obliterated   Vestibule Abnormal: atrophic vaginitis    Urethral meatus Normal     In-Clinic Test Results:  No results found for this or any previous visit (from the past 24 hour(s)).    ASSESSMENT/PLAN:                                                        ICD-10-CM    1. Lichen sclerosus  L90.0       2. Atrophic vaginitis  N95.2             -Continue clobetasol to once daily on 2 days a week.  -For flares, increase clobetasol ointment to bid for 7 days. Follow up if still experiencing symptoms after this. If better, decrease back down to twice a week for maintenance.  -Discussed the need for maintenance dosing of clobetasol to keep symptoms at bay.  -Needs vulvar exam yearly due to increased risk of SCC of the vulva. Regular use of clobetasol to control the progression of lichen sclerosus decreases this risk.   -Continue vaginal estrogen twice a week for atrophic vaginitis/GUSM.    Yamileth Gallegos MD  [facility]

## 2023-09-15 ENCOUNTER — NURSE TRIAGE (OUTPATIENT)
Dept: INTERNAL MEDICINE | Facility: CLINIC | Age: 73
End: 2023-09-15
Payer: COMMERCIAL

## 2023-09-15 NOTE — TELEPHONE ENCOUNTER
"Patient with hx of IBS calling for 2 weeks of worsening lower abdominal cramping-non-radiating. Now constant since yesterday evening. Ambulating without difficulty. No vomiting or blood in stool. Passing gas and had a BM today. Pain is waking her at night 8/10. Unrelieved with ibuprofen, heating pad, and BM passing. Recommended clear fluids only and holding any NSAIDS. Told patient to go to ER if no call back within 1 hour from PCP clinic with second level triage.     Phyllis Pires RN      Reason for Disposition   MILD TO MODERATE constant pain lasting > 2 hours    Answer Assessment - Initial Assessment Questions  1. LOCATION: \"Where does it hurt?\"       Lower abdomen-both sides  2. RADIATION: \"Does the pain shoot anywhere else?\" (e.g., chest, back)      No  3. ONSET: \"When did the pain begin?\" (e.g., minutes, hours or days ago)       Started 2 weeks ago  4. SUDDEN: \"Gradual or sudden onset?\"      Gradul  5. PATTERN \"Does the pain come and go, or is it constant?\"     - If it comes and goes: \"How long does it last?\" \"Do you have pain now?\"      (Note: Comes and goes means the pain is intermittent. It goes away completely between bouts.)     - If constant: \"Is it getting better, staying the same, or getting worse?\"       (Note: Constant means the pain never goes away completely; most serious pain is constant and gets worse.)       Worsening, keeps her up at night. Constant today  6. SEVERITY: \"How bad is the pain?\"  (e.g., Scale 1-10; mild, moderate, or severe)     - MILD (1-3): Doesn't interfere with normal activities, abdomen soft and not tender to touch.      - MODERATE (4-7): Interferes with normal activities or awakens from sleep, abdomen tender to touch.      - SEVERE (8-10): Excruciating pain, doubled over, unable to do any normal activities.        8/10  7. RECURRENT SYMPTOM: \"Have you ever had this type of stomach pain before?\" If Yes, ask: \"When was the last time?\" and \"What happened that time?\"       Not " "sure, does have hx of IBS and constipation. Passed BM this morning and still having pain  8. CAUSE: \"What do you think is causing the stomach pain?\"      Unknown, has been pooping regularly small amounts  9. RELIEVING/AGGRAVATING FACTORS: \"What makes it better or worse?\" (e.g., antacids, bending or twisting motion, bowel movement)      Drank coffee and did poop a little, also using heating pad, but isn't helping much  10. OTHER SYMPTOMS: \"Do you have any other symptoms?\" (e.g., back pain, diarrhea, fever, urination pain, vomiting)        No fever or vomitting, but some nausea when cramping, no urinary pain  11. PREGNANCY: \"Is there any chance you are pregnant?\" \"When was your last menstrual period?\"        N/A    Protocols used: Abdominal Pain - Female-A-[place]    "

## 2023-09-21 ENCOUNTER — NURSE TRIAGE (OUTPATIENT)
Dept: INTERNAL MEDICINE | Facility: CLINIC | Age: 73
End: 2023-09-21
Payer: COMMERCIAL

## 2023-09-21 NOTE — TELEPHONE ENCOUNTER
Nurse Triage SBAR    Is this a 2nd Level Triage? NO    Situation: Patient calls, was recently diagnosed with diverticulitis in ED at [place]. Is currently taking Augmentin. Patient lives in [place].    Background: Today has urinated a little more frequently. 4 times so far today, which is more often for her. No fever, no burning or pain with urination.   Has lower abdomen pain, feels like cramping, but has diverticulitis, patient is hesitant to take prescribed Percocet. Pain is seems to gas in nature. Denies nausea or vomiting.   Assessment: Possible gas in nature, no pain in urination.     Protocol Recommended Disposition:   Home Care, advised to take percocet as prescribed. If urination symptoms get worse ie more frequent, pain, burning fever please call or go to .    Recommendation:  Continue to monitor, if fever develops pain gets worse nausea vomiting please call.     Routed to provider    Does the patient meet one of the following criteria for ADS visit consideration? 16+ years old, with an [facility] PCP     TIP  Providers, please consider if this condition is appropriate for management at one of our Acute and Diagnostic Services sites.     If patient is a good candidate, please use dotphrase <dot>triageresponse and select Refer to ADS to document.  Reason for Disposition   Mild abdominal pain    Additional Information   Negative: Passed out (i.e., fainted, collapsed and was not responding)   Negative: Shock suspected (e.g., cold/pale/clammy skin, too weak to stand, low BP, rapid pulse)   Negative: Sounds like a life-threatening emergency to the triager   Negative: Followed an abdomen (stomach) injury   Negative: Chest pain   Negative: Abdominal pain and pregnant < 20 weeks   Negative: Abdominal pain and pregnant 20 or more weeks   Negative: Pain is mainly in upper abdomen (if needed ask: 'is it mainly above the belly button?')   Negative: Abdomen bloating or swelling are main symptoms   Negative: SEVERE  "abdominal pain (e.g., excruciating)   Negative: Vomiting red blood or black (coffee ground) material   Negative: Blood in bowel movements  (Exception: Blood on surface of BM with constipation.)   Negative: Black or tarry bowel movements  (Exception: Chronic-unchanged black-grey BMs AND is taking iron pills or Pepto-Bismol.)   Negative: MILD TO MODERATE constant pain lasting > 2 hours   Negative: Vomiting bile (green color)   Negative: Patient sounds very sick or weak to the triager   Negative: Vomiting and abdomen looks much more swollen than usual   Negative: White of the eyes have turned yellow (i.e., jaundice)   Negative: Blood in urine (red, pink, or tea-colored)   Negative: Fever > 103 F (39.4 C)   Negative: Fever > 101 F (38.3 C) and over 60 years of age   Negative: Fever > 100.0 F (37.8 C) and has diabetes mellitus or a weak immune system (e.g., HIV positive, cancer chemotherapy, organ transplant, splenectomy, chronic steroids)   Negative: Fever > 100.0 F (37.8 C) and bedridden (e.g., CVA, chronic illness, recovering from surgery)   Negative: Pregnant or could be pregnant (i.e., missed last menstrual period)   Negative: MODERATE pain (e.g., interferes with normal activities that comes and goes (cramps) lasts > 24 hours  (Exception: Pain with Vomiting or Diarrhea - see that Protocol.)   Negative: Unusual vaginal discharge   Negative: Age > 60 years   Negative: Patient wants to be seen   Negative: MILD pain (e.g., does not interfere with normal activities) and pain comes and goes (cramps) lasts > 48 hours  (Exception: This same abdominal pain is a chronic symptom recurrent or ongoing AND present > 4 weeks.)   Negative: Abdominal pain is a chronic symptom (recurrent or ongoing AND lasting > 4 weeks)   Negative: Pain with sexual intercourse (dyspareunia)    Answer Assessment - Initial Assessment Questions  1. LOCATION: \"Where does it hurt?\"       Lower abdomen  2. RADIATION: \"Does the pain shoot anywhere else?\" " "(e.g., chest, back)      no  3. ONSET: \"When did the pain begin?\" (e.g., minutes, hours or days ago)       A few days ago  4. SUDDEN: \"Gradual or sudden onset?\"      It got better but is worse now.  5. PATTERN \"Does the pain come and go, or is it constant?\"     - If it comes and goes: \"How long does it last?\" \"Do you have pain now?\"      (Note: Comes and goes means the pain is intermittent. It goes away completely between bouts.)     - If constant: \"Is it getting better, staying the same, or getting worse?\"       (Note: Constant means the pain never goes away completely; most serious pain is constant and gets worse.)       Come and go, was seen recently in ED at [place], diagnosed with Diverticulitis  6. SEVERITY: \"How bad is the pain?\"  (e.g., Scale 1-10; mild, moderate, or severe)     - MILD (1-3): Doesn't interfere with normal activities, abdomen soft and not tender to touch.      - MODERATE (4-7): Interferes with normal activities or awakens from sleep, abdomen tender to touch.      - SEVERE (8-10): Excruciating pain, doubled over, unable to do any normal activities.        3-4  7. RECURRENT SYMPTOM: \"Have you ever had this type of stomach pain before?\" If Yes, ask: \"When was the last time?\" and \"What happened that time?\"       no  8. CAUSE: \"What do you think is causing the stomach pain?\"      Gas or continued issues with diverticulitis  9. RELIEVING/AGGRAVATING FACTORS: \"What makes it better or worse?\" (e.g., antacids, bending or twisting motion, bowel movement)      Pain medication, patient has Rx of Percocet, is wondering if still ok to take Rx  10. OTHER SYMPTOMS: \"Do you have any other symptoms?\" (e.g., back pain, diarrhea, fever, urination pain, vomiting)        Frequent urination  11. PREGNANCY: \"Is there any chance you are pregnant?\" \"When was your last menstrual period?\"        no    Protocols used: Abdominal Pain - Female-A-[place]    "

## 2023-09-25 ENCOUNTER — TELEPHONE (OUTPATIENT)
Dept: INTERNAL MEDICINE | Facility: CLINIC | Age: 73
End: 2023-09-25

## 2023-09-25 NOTE — TELEPHONE ENCOUNTER
Reason for Call:  Appointment Request    Patient requesting this type of appt:  Preventive     Requested provider: Andrae Burns    Reason patient unable to be scheduled: Not within requested timeframe    When does patient want to be seen/preferred time:  next available- within 2 weeks if possible    Comments: Patient diagnosed with diverticulitis. Today was last day of meds. Planned on discussing at annual visit today but annual was cancelled due to provider out. Wanting to reschedule as soon as possible.      Could we send this information to you in [employer] or would you prefer to receive a phone call?:   Patient would like to be contacted via [employer]    Call taken on 9/25/2023 at 7:59 AM by Jodi Montano

## 2023-09-25 NOTE — TELEPHONE ENCOUNTER
Sending to PCP to review note below and advise if she would like to fit Patient in or have her see first available for the F/U? Schedule Annual for first available? Please advise.

## 2023-09-28 ASSESSMENT — ENCOUNTER SYMPTOMS
FREQUENCY: 0
HEARTBURN: 0
DIZZINESS: 0
FEVER: 0
PARESTHESIAS: 0
PALPITATIONS: 0
WEAKNESS: 0
ABDOMINAL PAIN: 1
ARTHRALGIAS: 1
HEMATOCHEZIA: 0
JOINT SWELLING: 0
DYSURIA: 0
NAUSEA: 0
CHILLS: 0
HEMATURIA: 0
HEADACHES: 0
SORE THROAT: 0
EYE PAIN: 0
NERVOUS/ANXIOUS: 0
SHORTNESS OF BREATH: 0
DIARRHEA: 0
MYALGIAS: 0
BREAST MASS: 0
COUGH: 0
CONSTIPATION: 0

## 2023-09-28 ASSESSMENT — ACTIVITIES OF DAILY LIVING (ADL): CURRENT_FUNCTION: NO ASSISTANCE NEEDED

## 2023-10-02 ENCOUNTER — OFFICE VISIT (OUTPATIENT)
Dept: INTERNAL MEDICINE | Facility: CLINIC | Age: 73
End: 2023-10-02
Payer: COMMERCIAL

## 2023-10-02 VITALS
BODY MASS INDEX: 22.8 KG/M2 | TEMPERATURE: 97 F | DIASTOLIC BLOOD PRESSURE: 67 MMHG | SYSTOLIC BLOOD PRESSURE: 124 MMHG | WEIGHT: 137 LBS | OXYGEN SATURATION: 99 % | HEART RATE: 84 BPM | RESPIRATION RATE: 16 BRPM

## 2023-10-02 DIAGNOSIS — Z87.19 HISTORY OF DIVERTICULITIS OF COLON: ICD-10-CM

## 2023-10-02 DIAGNOSIS — I10 ESSENTIAL HYPERTENSION: ICD-10-CM

## 2023-10-02 DIAGNOSIS — Z00.00 ENCOUNTER FOR MEDICARE ANNUAL WELLNESS EXAM: Primary | ICD-10-CM

## 2023-10-02 PROCEDURE — 99213 OFFICE O/P EST LOW 20 MIN: CPT | Mod: 25 | Performed by: INTERNAL MEDICINE

## 2023-10-02 PROCEDURE — G0439 PPPS, SUBSEQ VISIT: HCPCS | Performed by: INTERNAL MEDICINE

## 2023-10-02 RX ORDER — LISINOPRIL 10 MG/1
10 TABLET ORAL DAILY
Qty: 90 TABLET | Refills: 1 | Status: SHIPPED | OUTPATIENT
Start: 2023-10-02 | End: 2024-04-23

## 2023-10-02 ASSESSMENT — ENCOUNTER SYMPTOMS
NAUSEA: 0
DIZZINESS: 0
BREAST MASS: 0
WEAKNESS: 0
MYALGIAS: 0
CHILLS: 0
EYE PAIN: 0
PARESTHESIAS: 0
HEADACHES: 0
DIARRHEA: 0
NERVOUS/ANXIOUS: 0
SORE THROAT: 0
HEMATURIA: 0
CONSTIPATION: 0
SHORTNESS OF BREATH: 0
HEARTBURN: 0
ARTHRALGIAS: 1
FREQUENCY: 0
JOINT SWELLING: 0
FEVER: 0
PALPITATIONS: 0
COUGH: 0
DYSURIA: 0
ABDOMINAL PAIN: 1
HEMATOCHEZIA: 0

## 2023-10-02 ASSESSMENT — ACTIVITIES OF DAILY LIVING (ADL): CURRENT_FUNCTION: NO ASSISTANCE NEEDED

## 2023-10-02 NOTE — PROGRESS NOTES
"SUBJECTIVE:   Esha is a 73 year old who presents for Preventive Visit.    Non-fasting.          10/2/2023     2:19 PM   Additional Questions   Roomed by GÉNESIS Whittington LPN   Accompanied by self         10/2/2023     2:19 PM   Patient Reported Additional Medications   Patient reports taking the following new medications none       Are you in the first 12 months of your Medicare coverage?  No    Healthy Habits:     In general, how would you rate your overall health?  Good    Frequency of exercise:  2-3 days/week    Duration of exercise:  15-30 minutes    Do you usually eat at least 4 servings of fruit and vegetables a day, include whole grains    & fiber and avoid regularly eating high fat or \"junk\" foods?  No    Taking medications regularly:  Yes    Medication side effects:  Not applicable    Ability to successfully perform activities of daily living:  No assistance needed    Home Safety:  Lack of grab bars in the bathroom    Hearing Impairment:  No hearing concerns    In the past 6 months, have you been bothered by leaking of urine?  No    In general, how would you rate your overall mental or emotional health?  Excellent    Additional concerns today:  No      Today's PHQ-2 Score:       10/2/2023    12:49 PM   PHQ-2 ( 1999 [employer])   Q1: Little interest or pleasure in doing things 0   Q2: Feeling down, depressed or hopeless 0   PHQ-2 Score 0   Q1: Little interest or pleasure in doing things Not at all   Q2: Feeling down, depressed or hopeless Not at all   PHQ-2 Score 0         Have you ever done Advance Care Planning? (For example, a Health Directive, POLST, or a discussion with a medical provider or your loved ones about your wishes): Yes, patient states has an Advance Care Planning document and will bring a copy to the clinic.       Fall risk  Fallen 2 or more times in the past year?: No  Any fall with injury in the past year?: No    Cognitive Screening   1) Repeat 3 items (Leader, Season, Table)    2) Clock draw: " NORMAL  3) 3 item recall: Recalls 3 objects  Results: 3 items recalled: COGNITIVE IMPAIRMENT LESS LIKELY    Mini-CogTM Copyright S Mark. Licensed by the author for use in [facility]; reprinted with permission (chuckie@[facility]). All rights reserved.      Do you have sleep apnea, excessive snoring or daytime drowsiness? : no    Reviewed and updated as needed this visit by clinical staff   Tobacco  Allergies  Meds  Problems  Med Hx  Surg Hx  Fam Hx          Reviewed and updated as needed this visit by Provider                   Past Medical History:   Diagnosis Date     Arthritis      Disorder of bone and cartilage, unspecified      Esophageal reflux      Essential hypertension 07/26/2022     Irritable bowel syndrome      Osteopenia 06/2014    -1.5       Past Surgical History:   Procedure Laterality Date     ABDOMEN SURGERY       BLADDER SURGERY       COLONOSCOPY       COLONOSCOPY N/A 10/16/2020    Procedure: COLONOSCOPY with random colon biopsies;  Surgeon: Louie Patiño MD;  Location:  GI     CYSTOSCOPY       ESOPHAGOSCOPY, GASTROSCOPY, DUODENOSCOPY (EGD), COMBINED N/A 7/21/2023    Procedure: Esophagoscopy, gastroscopy, duodenoscopy (EGD), with biopsies;  Surgeon: Louie Patiño MD;  Location:  GI     GENITOURINARY SURGERY       HC REMOVAL GALLBLADDER       HC REMOVE TONSILS/ADENOIDS,<13 Y/O       ORTHOPEDIC SURGERY      wrist 2017 george dunham      VASCULAR SURGERY       ZZC APPENDECTOMY       ZZC LIGATE FALLOPIAN TUBE       ZZC NONSPECIFIC PROCEDURE      vein stripping x 3     ZZC TOTAL ABDOM HYSTERECTOMY      for fibroids with BSO       Current Outpatient Medications   Medication Sig Dispense Refill     Cetirizine-Pseudoephedrine (ZYRTEC-D PO) PRN       clobetasol (TEMOVATE) 0.05 % external ointment Apply topically.  Use daily during a flare, apply twice weekly between flares for prevention. 45 g 3     estradiol (ESTRACE VAGINAL) 0.1 MG/GM vaginal cream Apply a small amount to the  opening to the vagina twice a week. 42.5 g 6     fluticasone (FLONASE) 50 MCG/ACT nasal spray Spray 1 spray into both nostrils daily       IBUPROFEN 200 MG OR TABS        lisinopril (ZESTRIL) 10 MG tablet Take 1 tablet (10 mg) by mouth daily 90 tablet 1     Melatonin 10 MG TABS tablet Take 10 mg by mouth nightly as needed for sleep       nitroGLYcerin (NITROSTAT) 0.4 MG sublingual tablet Place 1 tablet (0.4 mg) under the tongue every 5 minutes as needed for chest pain If you are still having symptoms after 3 doses (15 minutes) call 911. 25 tablet 0     polyethylene glycol (MIRALAX/GLYCOLAX) powder Take 1 capful by mouth as needed for constipation       polyethylene glycol-propylene glycol (SYSTANE ULTRA) 0.4-0.3 % SOLN ophthalmic solution Place 1 drop into both eyes every hour as needed for dry eyes       RESTASIS 0.05 % ophthalmic emulsion INSTILL ONE DROP IN EACH EYE TWO TIMES A DAY  3     tiZANidine (ZANAFLEX) 4 MG tablet Take 1 tablet (4 mg) by mouth nightly as needed for muscle spasms 30 tablet 0     Cranberry 250 MG CAPS Take by mouth 2 times daily       phenazopyridine (AZO URINARY PAIN RELIEF) 95 MG tablet Take 190 mg by mouth 3 times daily         Family History   Problem Relation Age of Onset     Diabetes Mother         born 1928     Hypertension Mother      Obesity Mother      Cancer Paternal Grandfather         skin     Colon Cancer Maternal Grandfather         had in his late 80s     Substance Abuse Brother         hep c  .     Osteoporosis Paternal Grandmother        Social History     Tobacco Use     Smoking status: Never     Smokeless tobacco: Never   Substance Use Topics     Alcohol use: No         2023     5:16 PM   Alcohol Use   Prescreen: >3 drinks/day or >7 drinks/week? Not Applicable       Do you have a current opioid prescription? No  Do you use any other controlled substances or medications that are not prescribed by a provider? None      ED/UC Followup:  Facility:  [facility]  Date of  visit: 9-  Reason for visit: diverticulitis - patient was evaluated in ER on 9/15/2023 and was treated with Augmentin.  Completed Augmentin 1 week ago and feeling much better.      Hypertension Follow-up    Do you check your blood pressure regularly outside of the clinic? Yes   Are you following a low salt diet? Yes  Are your blood pressures ever more than 140 on the top number (systolic) OR more   than 90 on the bottom number (diastolic), for example 140/90? No    Current providers sharing in care for this patient include:   Patient Care Team:  Andrae Burns MD as PCP - General (Internal Medicine)  Andrae Burns MD as Assigned PCP  Yamileth Gallegos MD as Referring Physician (OB/Gyn)  Latonia Benz APRN CNP as Nurse Practitioner (Dermatology)  Latonia Benz APRN CNP as Assigned Surgical Provider  Yamileth Gallegos MD as Assigned OBGYN Provider    The following health maintenance items are reviewed in Epic and correct as of today:  Health Maintenance   Topic Date Due     ANNUAL REVIEW OF HM ORDERS  06/29/2023     INFLUENZA VACCINE (1) 09/01/2023     COVID-19 Vaccine (3 - 2023-24 season) 09/01/2023     MEDICARE ANNUAL WELLNESS VISIT  09/21/2023     MAMMO SCREENING  09/21/2023     FALL RISK ASSESSMENT  10/02/2024     DTAP/TDAP/TD IMMUNIZATION (4 - Td or Tdap) 07/05/2026     LIPID  09/21/2027     ADVANCE CARE PLANNING  09/21/2027     DEXA  10/08/2035     HEPATITIS C SCREENING  Completed     PHQ-2 (once per calendar year)  Completed     Pneumococcal Vaccine: 65+ Years  Completed     ZOSTER IMMUNIZATION  Completed     IPV IMMUNIZATION  Aged Out     HPV IMMUNIZATION  Aged Out     MENINGITIS IMMUNIZATION  Aged Out     COLORECTAL CANCER SCREENING  Discontinued               Review of Systems   Constitutional:  Negative for chills and fever.   HENT:  Negative for congestion, ear pain, hearing loss and sore throat.    Eyes:  Positive for visual disturbance. Negative for pain.        " Follows up with ophthalmologist   Respiratory:  Negative for cough and shortness of breath.    Cardiovascular:  Negative for chest pain, palpitations and peripheral edema.   Gastrointestinal:  Positive for abdominal pain. Negative for constipation, diarrhea, heartburn, hematochezia and nausea.        Recent diverticulitis treated   Breasts:  Negative for tenderness, breast mass and discharge.   Genitourinary:  Negative for dysuria, frequency, genital sores, hematuria, pelvic pain, urgency, vaginal bleeding and vaginal discharge.   Musculoskeletal:  Positive for arthralgias. Negative for joint swelling and myalgias.   Skin:  Negative for rash.   Neurological:  Negative for dizziness, weakness, headaches and paresthesias.   Psychiatric/Behavioral:  Negative for mood changes. The patient is not nervous/anxious.        OBJECTIVE:   /67   Pulse 84   Temp 97  F (36.1  C)   Resp 16   Wt 62.1 kg (137 lb)   SpO2 99%   Breastfeeding No   BMI 22.80 kg/m   Estimated body mass index is 22.8 kg/m  as calculated from the following:    Height as of 7/21/23: 1.651 m (5' 5\").    Weight as of this encounter: 62.1 kg (137 lb).  Physical Exam  GENERAL APPEARANCE: healthy, alert and no distress  EYES: Eyes grossly normal to inspection, PERRL and conjunctivae and sclerae normal  HENT: ear canals and TM's normal, nose and mouth without ulcers or lesions, oropharynx clear and oral mucous membranes moist  NECK: no adenopathy, no asymmetry, masses, or scars and thyroid normal to palpation  RESP: lungs clear to auscultation - no rales, rhonchi or wheezes  BREAST: normal without masses, tenderness or nipple discharge and no palpable axillary masses or adenopathy  CV: regular rate and rhythm, normal S1 S2,   ABDOMEN: soft, mild LLQ tenderness(significantly better per patient) and bowel sounds normal  MS: no musculoskeletal defects are noted and gait is age appropriate without ataxia  NEURO: Normal strength and tone, sensory exam " grossly normal, mentation intact and speech normal  PSYCH: mentation appears normal and affect normal/bright    ASSESSMENT / PLAN:       (Z00.00) Encounter for Medicare annual wellness exam  (primary encounter diagnosis)  Plan: Recent labs from ER reviewed had CBC and CMP, check lipid panel reflex to direct LDL Fasting, mammogram has been scheduled, up-to-date on colonoscopy.            (I10) Essential hypertension  Comment: Stable  Plan: lisinopril (ZESTRIL) 10 MG tablet refilled as directed.explained clearly about the medication,insructions and side effects.Advised to follow low salt diet and exercise and f/u in 6 mths.       (Z87.19) History of diverticulitis of colon  Plan: Completed antibiotic about 1 week ago, feeling much better, patient was given information on the diet.Call or return to clinic prn if new symptoms develop.      Patient has been advised of split billing requirements and indicates understanding: Yes    MED REC REQUIRED  Post Medication Reconciliation Status:  Discharge medications reconciled, continue medications without change    COUNSELING:  Reviewed preventive health counseling, as reflected in patient instructions       Regular exercise       Healthy diet/nutrition       Immunizations  Declined: Influenza           She reports that she has never smoked. She has never used smokeless tobacco.      Appropriate preventive services were discussed with this patient, including applicable screening as appropriate for fall prevention, nutrition, physical activity, Tobacco-use cessation, weight loss and cognition.  Checklist reviewing preventive services available has been given to the patient.    Reviewed patients plan of care and provided an AVS. The Basic Care Plan (routine screening as documented in Health Maintenance) for Nita meets the Care Plan requirement. This Care Plan has been established and reviewed with the Patient.          Andrae Burns MD  [facility]  BETY    Identified Health Risks:

## 2023-10-06 ENCOUNTER — LAB (OUTPATIENT)
Dept: LAB | Facility: CLINIC | Age: 73
End: 2023-10-06
Payer: COMMERCIAL

## 2023-10-06 ENCOUNTER — HOSPITAL ENCOUNTER (OUTPATIENT)
Dept: MAMMOGRAPHY | Facility: CLINIC | Age: 73
Discharge: HOME | End: 2023-10-06
Attending: INTERNAL MEDICINE | Admitting: INTERNAL MEDICINE
Payer: COMMERCIAL

## 2023-10-06 DIAGNOSIS — Z12.31 VISIT FOR SCREENING MAMMOGRAM: ICD-10-CM

## 2023-10-06 DIAGNOSIS — Z00.00 ENCOUNTER FOR MEDICARE ANNUAL WELLNESS EXAM: ICD-10-CM

## 2023-10-06 LAB
CHOLEST SERPL-MCNC: 198 MG/DL
HDLC SERPL-MCNC: 58 MG/DL
LDLC SERPL CALC-MCNC: 118 MG/DL
NONHDLC SERPL-MCNC: 140 MG/DL
TRIGL SERPL-MCNC: 110 MG/DL

## 2023-10-06 PROCEDURE — 36415 COLL VENOUS BLD VENIPUNCTURE: CPT

## 2023-10-06 PROCEDURE — 77067 SCR MAMMO BI INCL CAD: CPT

## 2023-10-06 PROCEDURE — 80061 LIPID PANEL: CPT

## 2023-12-21 ENCOUNTER — TELEPHONE (OUTPATIENT)
Dept: INTERNAL MEDICINE | Facility: CLINIC | Age: 73
End: 2023-12-21
Payer: COMMERCIAL

## 2023-12-21 NOTE — TELEPHONE ENCOUNTER
Called patient regarding pre-op appointment on 12/22/2023. Patient's second cataract surgery is not until 1/23/2024 - the concern is that her surgeon will not accept her 12/22/2023 pre-op for the 1/23/2024 surgery. Patient is contacting her surgeon's office to see if they would accept the current scheduled pre-op.     If she does need to reschedule, please schedule on 1/3/2024 with Claudine or check with PCP to see if she could see the patient on 1/2/2024.

## 2023-12-22 ENCOUNTER — OFFICE VISIT (OUTPATIENT)
Dept: INTERNAL MEDICINE | Facility: CLINIC | Age: 73
End: 2023-12-22
Payer: COMMERCIAL

## 2023-12-22 VITALS
OXYGEN SATURATION: 98 % | HEART RATE: 82 BPM | BODY MASS INDEX: 24.96 KG/M2 | RESPIRATION RATE: 16 BRPM | WEIGHT: 150 LBS | SYSTOLIC BLOOD PRESSURE: 124 MMHG | TEMPERATURE: 98.1 F | DIASTOLIC BLOOD PRESSURE: 74 MMHG

## 2023-12-22 DIAGNOSIS — Z01.818 PRE-OP EXAM: Primary | ICD-10-CM

## 2023-12-22 DIAGNOSIS — H25.9 AGE-RELATED CATARACT OF BOTH EYES, UNSPECIFIED AGE-RELATED CATARACT TYPE: ICD-10-CM

## 2023-12-22 PROCEDURE — 99214 OFFICE O/P EST MOD 30 MIN: CPT

## 2023-12-22 NOTE — PROGRESS NOTES
[place] [address]  [address]  [facility] [address]  Phone: [phone]  Primary Provider: Andrae Burns  Pre-op Performing Provider: KEL MENON      PREOPERATIVE EVALUATION:  Today's date: 12/22/2023    Esha is a 73 year old, presenting for the following:  Pre-Op Exam        Surgical Information:  Surgery/Procedure: cataract  Surgery Location: [facility]  Surgeon: Kristofer Fournier   Surgery Date: 1/4/2024 & 1/23/2024  Time of Surgery: 1:41 PM  Where patient plans to recover: At home with family  Fax number for surgical facility: 1 [phone]     Assessment & Plan     The proposed surgical procedure is considered LOW risk.    (Z01.818) Pre-op exam  (primary encounter diagnosis)  Comment: Patient presents to the clinic for a preop exam for cataract surgery.  Patient is planning to have her cataracts done on January 4 and as well as January 23.  Please allow this as an extension to both of surgeries as the second surgery will be 2 days post the 30-day preop cut off.  Patient surgeon also requested for an extension for the preop.  Plan: Patient is otherwise healthy with no current concerns.  At this time I do not see any major concerns to not proceed with the procedure.    (H25.9) Age-related cataract of both eyes, unspecified age-related cataract type  Comment: Patient has cataracts of both eyes and is electing to undergo cataract surgery on January 4 in January 2013.  Plan: At this time I do not see any major concerns to not proceed with the procedure.            - No identified additional risk factors other than previously addressed    Antiplatelet or Anticoagulation Medication Instructions:   - Bleeding risk is low for this procedure (e.g. dental, skin, cataract).    Additional Medication Instructions:  Patient is to take all scheduled medications on the day of surgery    RECOMMENDATION:  APPROVAL GIVEN to proceed with proposed procedure, without  further diagnostic evaluation.            Subjective       HPI related to upcoming procedure: Patient presents to the clinic today for a preop exam for cataracts.  Patient has been feeling well denies shortness of breath, heart palpitations, chest pain.  Her  was just diagnosed with terminal cancer and was given a 1 to 2-month life expectancy.  The patient did verbalize that there could be potential that she needs to cancel and reschedule her cataract surgery but wants to continue with planned surgery date as of now.        12/15/2023     9:07 AM   Preop Questions   1. Have you ever had a heart attack or stroke? No   2. Have you ever had surgery on your heart or blood vessels, such as a stent placement, a coronary artery bypass, or surgery on an artery in your head, neck, heart, or legs? NO   3. Do you have chest pain with activity? No   4. Do you have a history of  heart failure? No   5. Do you currently have a cold, bronchitis or symptoms of other infection? No   6. Do you have a cough, shortness of breath, or wheezing? No   7. Do you or anyone in your family have previous history of blood clots? No   8. Do you or does anyone in your family have a serious bleeding problem such as prolonged bleeding following surgeries or cuts? No   9. Have you ever had problems with anemia or been told to take iron pills? No   10. Have you had any abnormal blood loss such as black, tarry or bloody stools, or abnormal vaginal bleeding? No   11. Have you ever had a blood transfusion? No   12. Are you willing to have a blood transfusion if it is medically needed before, during, or after your surgery? Yes   13. Have you or any of your relatives ever had problems with anesthesia? No   14. Do you have sleep apnea, excessive snoring or daytime drowsiness? No   15. Do you have any artifical heart valves or other implanted medical devices like a pacemaker, defibrillator, or continuous glucose monitor? No   16. Do you have artificial  joints? No   17. Are you allergic to latex? No       Health Care Directive:  Patient does not have a Health Care Directive or Living Will: Discussed advance care planning with patient; however, patient declined at this time.    Preoperative Review of :   reviewed - no record of controlled substances prescribed.      Status of Chronic Conditions:  HYPERTENSION - Patient has longstanding history of HTN , currently denies any symptoms referable to elevated blood pressure. Specifically denies chest pain, palpitations, dyspnea, orthopnea, PND or peripheral edema. Blood pressure readings have been in normal range. Current medication regimen is as listed below. Patient denies any side effects of medication.     Review of Systems  CONSTITUTIONAL: NEGATIVE for fever, chills, change in weight  ENT/MOUTH: NEGATIVE for ear, mouth and throat problems  RESP: NEGATIVE for significant cough or SOB  CV: NEGATIVE for chest pain, palpitations or peripheral edema    Patient Active Problem List    Diagnosis Date Noted    History of diverticulitis of colon 10/02/2023     Priority: Medium    Vaginal stenosis 03/16/2023     Priority: Medium    Lichen sclerosus 03/16/2023     Priority: Medium    Varicose veins of both legs with edema 07/26/2022     Priority: Medium    Essential hypertension 07/26/2022     Priority: Medium    Irritable bowel syndrome with both constipation and diarrhea 05/28/2020     Priority: Medium    Dry eyes, bilateral 10/12/2016     Priority: Medium    Osteopenia 08/01/2016     Priority: Medium    Esophageal reflux 06/27/2006     Priority: Medium    Chronic maxillary sinusitis 08/03/2004     Priority: Medium      Past Medical History:   Diagnosis Date    Arthritis     Disorder of bone and cartilage, unspecified     Esophageal reflux     Essential hypertension 07/26/2022    Irritable bowel syndrome     Osteopenia 06/2014    -1.5     Past Surgical History:   Procedure Laterality Date    ABDOMEN SURGERY      BLADDER  SURGERY      COLONOSCOPY      COLONOSCOPY N/A 10/16/2020    Procedure: COLONOSCOPY with random colon biopsies;  Surgeon: Louie Patiño MD;  Location:  GI    CYSTOSCOPY      ESOPHAGOSCOPY, GASTROSCOPY, DUODENOSCOPY (EGD), COMBINED N/A 7/21/2023    Procedure: Esophagoscopy, gastroscopy, duodenoscopy (EGD), with biopsies;  Surgeon: Louie Patiño MD;  Location:  GI    GENITOURINARY SURGERY      HC REMOVAL GALLBLADDER      HC REMOVE TONSILS/ADENOIDS,<11 Y/O      ORTHOPEDIC SURGERY      wrist 2017 george [facility]ham     VASCULAR SURGERY      ZZC APPENDECTOMY      ZZC LIGATE FALLOPIAN TUBE      ZZC NONSPECIFIC PROCEDURE      vein stripping x 3    ZZC TOTAL ABDOM HYSTERECTOMY      for fibroids with BSO     Current Outpatient Medications   Medication Sig Dispense Refill    Cetirizine-Pseudoephedrine (ZYRTEC-D PO) PRN      clobetasol (TEMOVATE) 0.05 % external ointment Apply topically.  Use daily during a flare, apply twice weekly between flares for prevention. 45 g 3    estradiol (ESTRACE VAGINAL) 0.1 MG/GM vaginal cream Apply a small amount to the opening to the vagina twice a week. 42.5 g 6    fluticasone (FLONASE) 50 MCG/ACT nasal spray Spray 1 spray into both nostrils daily      IBUPROFEN 200 MG OR TABS       lisinopril (ZESTRIL) 10 MG tablet Take 1 tablet (10 mg) by mouth daily 90 tablet 1    Melatonin 10 MG TABS tablet Take 10 mg by mouth nightly as needed for sleep      nitroGLYcerin (NITROSTAT) 0.4 MG sublingual tablet Place 1 tablet (0.4 mg) under the tongue every 5 minutes as needed for chest pain If you are still having symptoms after 3 doses (15 minutes) call 911. 25 tablet 0    polyethylene glycol (MIRALAX/GLYCOLAX) powder Take 1 capful by mouth as needed for constipation      polyethylene glycol-propylene glycol (SYSTANE ULTRA) 0.4-0.3 % SOLN ophthalmic solution Place 1 drop into both eyes every hour as needed for dry eyes      RESTASIS 0.05 % ophthalmic emulsion INSTILL ONE DROP IN EACH EYE TWO TIMES A  DAY  3    tiZANidine (ZANAFLEX) 4 MG tablet Take 1 tablet (4 mg) by mouth nightly as needed for muscle spasms 30 tablet 0    Cranberry 250 MG CAPS Take by mouth 2 times daily      phenazopyridine (AZO URINARY PAIN RELIEF) 95 MG tablet Take 190 mg by mouth 3 times daily         Allergies   Allergen Reactions    Ciprofloxacin      Rash, eyelids red puffy & itchy    Codeine      Esophageal spasms        Social History     Tobacco Use    Smoking status: Never    Smokeless tobacco: Never   Substance Use Topics    Alcohol use: No     Family History   Problem Relation Age of Onset    Diabetes Mother         born 192    Hypertension Mother     Obesity Mother     Cancer Paternal Grandfather         skin    Colon Cancer Maternal Grandfather         had in his late 80s    Substance Abuse Brother         hep c  .    Osteoporosis Paternal Grandmother      History   Drug Use No         Objective     /74 (BP Location: Left arm, Patient Position: Sitting, Cuff Size: Adult Regular)   Pulse 82   Temp 98.1  F (36.7  C) (Oral)   Resp 16   Wt 68 kg (150 lb)   SpO2 98%   BMI 24.96 kg/m      Physical Exam  GENERAL APPEARANCE: healthy, alert and no distress  HENT: ear canals and TM's normal and nose and mouth without ulcers or lesions  RESP: lungs clear to auscultation - no rales, rhonchi or wheezes  CV: regular rate and rhythm, normal S1 S2, no S3 or S4 and no murmur, click or rub   ABDOMEN: soft, nontender, no HSM or masses and bowel sounds normal  NEURO: Normal strength and tone, sensory exam grossly normal, mentation intact and speech normal    Recent Labs   Lab Test 06/15/23  0931 23  1015 22  1107 22  1247 22  1359   HGB  --  14.2 14.3 13.4 14.1   PLT  --   --   --  220 223   NA  --  141 141 143 141   POTASSIUM  --  3.9 4.0 3.6 3.9   CR 0.8 0.74 0.78 0.69 0.77        Diagnostics:  No labs were ordered during this visit.   No EKG required for low risk surgery (cataract, skin procedure,  breast biopsy, etc).    Revised Cardiac Risk Index (RCRI):  The patient has the following serious cardiovascular risks for perioperative complications:   - No serious cardiac risks = 0 points     RCRI Interpretation: 0 points: Class I (very low risk - 0.4% complication rate)         Signed Electronically by: CADY Esparza CNP  Copy of this evaluation report is provided to requesting physician.

## 2024-04-23 ENCOUNTER — OFFICE VISIT (OUTPATIENT)
Dept: INTERNAL MEDICINE | Facility: CLINIC | Age: 74
End: 2024-04-23
Payer: COMMERCIAL

## 2024-04-23 VITALS
SYSTOLIC BLOOD PRESSURE: 110 MMHG | HEIGHT: 65 IN | RESPIRATION RATE: 13 BRPM | TEMPERATURE: 97.2 F | DIASTOLIC BLOOD PRESSURE: 62 MMHG | WEIGHT: 151 LBS | BODY MASS INDEX: 25.16 KG/M2 | OXYGEN SATURATION: 98 % | HEART RATE: 98 BPM

## 2024-04-23 DIAGNOSIS — I10 ESSENTIAL HYPERTENSION: Primary | ICD-10-CM

## 2024-04-23 PROCEDURE — 80048 BASIC METABOLIC PNL TOTAL CA: CPT | Performed by: INTERNAL MEDICINE

## 2024-04-23 PROCEDURE — 36415 COLL VENOUS BLD VENIPUNCTURE: CPT | Performed by: INTERNAL MEDICINE

## 2024-04-23 PROCEDURE — 99213 OFFICE O/P EST LOW 20 MIN: CPT | Performed by: INTERNAL MEDICINE

## 2024-04-23 RX ORDER — LISINOPRIL 10 MG/1
10 TABLET ORAL DAILY
Qty: 90 TABLET | Refills: 1 | Status: SHIPPED | OUTPATIENT
Start: 2024-04-23

## 2024-04-23 NOTE — PROGRESS NOTES
"  Assessment & Plan     (I10) Essential hypertension  (primary encounter diagnosis)  Comment: BP well controlled   Plan: refilled lisinopril (ZESTRIL) 10 MG tablet daily as directed.explained clearly about the medication,insructions and side effects. Check  Basic         metabolic panel.  Advised to follow low salt diet and exercise and f/u in 6 mths.          Review of the result(s) of each unique test - BMP  Prescription drug management        BMI  Estimated body mass index is 25.13 kg/m  as calculated from the following:    Height as of this encounter: 1.651 m (5' 5\").    Weight as of this encounter: 68.5 kg (151 lb).           Lexa Balbuena is a 73 year old, presenting for the following health issues:  Hypertension      4/23/2024    10:46 AM   Additional Questions   Roomed by daniel   Accompanied by self         4/23/2024    10:46 AM   Patient Reported Additional Medications   Patient reports taking the following new medications none     History of Present Illness       Hypertension: She presents for follow up of hypertension.  She does check blood pressure  regularly outside of the clinic. Outpatient blood pressures have not been over 140/90. She follows a low salt diet.     She eats 0-1 servings of fruits and vegetables daily.She consumes 0 sweetened beverage(s) daily.She exercises with enough effort to increase her heart rate 9 or less minutes per day.  She exercises with enough effort to increase her heart rate 3 or less days per week.   She is taking medications regularly.        Hypertension Follow-up    Do you check your blood pressure regularly outside of the clinic? Yes   Are you following a low salt diet? Yes  Are your blood pressures ever more than 140 on the top number (systolic) OR more   than 90 on the bottom number (diastolic), for example 140/90? No      Past Medical History:   Diagnosis Date    Arthritis     Disorder of bone and cartilage, unspecified     Esophageal reflux     Essential " hypertension 07/26/2022    Irritable bowel syndrome     Osteopenia 06/2014    -1.5       Current Outpatient Medications   Medication Sig Dispense Refill    estradiol (ESTRACE VAGINAL) 0.1 MG/GM vaginal cream Apply a small amount to the opening to the vagina twice a week. 42.5 g 6    fluticasone (FLONASE) 50 MCG/ACT nasal spray Spray 1 spray into both nostrils daily      lisinopril (ZESTRIL) 10 MG tablet Take 1 tablet (10 mg) by mouth daily 90 tablet 1    RESTASIS 0.05 % ophthalmic emulsion INSTILL ONE DROP IN EACH EYE TWO TIMES A DAY  3    tiZANidine (ZANAFLEX) 4 MG tablet Take 1 tablet (4 mg) by mouth nightly as needed for muscle spasms 30 tablet 0    clobetasol (TEMOVATE) 0.05 % external ointment Apply topically.  Use daily during a flare, apply twice weekly between flares for prevention. (Patient not taking: Reported on 4/23/2024) 45 g 3    Cranberry 250 MG CAPS Take by mouth 2 times daily      IBUPROFEN 200 MG OR TABS  (Patient not taking: Reported on 4/23/2024)      Melatonin 10 MG TABS tablet Take 10 mg by mouth nightly as needed for sleep (Patient not taking: Reported on 4/23/2024)      nitroGLYcerin (NITROSTAT) 0.4 MG sublingual tablet Place 1 tablet (0.4 mg) under the tongue every 5 minutes as needed for chest pain If you are still having symptoms after 3 doses (15 minutes) call 911. (Patient not taking: Reported on 4/23/2024) 25 tablet 0    polyethylene glycol (MIRALAX/GLYCOLAX) powder Take 1 capful by mouth as needed for constipation (Patient not taking: Reported on 4/23/2024)      polyethylene glycol-propylene glycol (SYSTANE ULTRA) 0.4-0.3 % SOLN ophthalmic solution Place 1 drop into both eyes every hour as needed for dry eyes (Patient not taking: Reported on 4/23/2024)     `        Review of Systems  CONSTITUTIONAL: NEGATIVE for fever, chills,    RESP: NEGATIVE for significant cough or SOB  CV: NEGATIVE for chest pain, palpitations or peripheral edema      Objective    /62   Pulse 98   Temp  "97.2  F (36.2  C) (Tympanic)   Resp 13   Ht 1.651 m (5' 5\")   Wt 68.5 kg (151 lb)   SpO2 98%   BMI 25.13 kg/m    Body mass index is 25.13 kg/m .  Physical Exam   GENERAL: alert and no distress  RESP: lungs clear to auscultation - no rales, rhonchi or wheezes  CV: regular rate and rhythm, normal S1 S2,   MS: no gross musculoskeletal defects noted, no edema  PSYCH: mentation appears normal, affect normal/bright           Signed Electronically by: Andrae Burns MD    "

## 2024-04-24 LAB
ANION GAP SERPL CALCULATED.3IONS-SCNC: 10 MMOL/L (ref 7–15)
BUN SERPL-MCNC: 20 MG/DL (ref 8–23)
CALCIUM SERPL-MCNC: 9.2 MG/DL (ref 8.8–10.2)
CHLORIDE SERPL-SCNC: 106 MMOL/L (ref 98–107)
CREAT SERPL-MCNC: 0.74 MG/DL (ref 0.51–0.95)
DEPRECATED HCO3 PLAS-SCNC: 26 MMOL/L (ref 22–29)
EGFRCR SERPLBLD CKD-EPI 2021: 85 ML/MIN/1.73M2
GLUCOSE SERPL-MCNC: 95 MG/DL (ref 70–99)
POTASSIUM SERPL-SCNC: 4.2 MMOL/L (ref 3.4–5.3)
SODIUM SERPL-SCNC: 142 MMOL/L (ref 135–145)

## 2024-08-28 ENCOUNTER — TRANSFERRED RECORDS (OUTPATIENT)
Dept: HEALTH INFORMATION MANAGEMENT | Facility: CLINIC | Age: 74
End: 2024-08-28
Payer: COMMERCIAL

## 2024-11-03 ENCOUNTER — HEALTH MAINTENANCE LETTER (OUTPATIENT)
Age: 74
End: 2024-11-03

## 2024-12-29 ENCOUNTER — HEALTH MAINTENANCE LETTER (OUTPATIENT)
Age: 74
End: 2024-12-29

## (undated) DEVICE — ENDO FORCEP ENDOJAW BIOPSY 2.8MMX230CM FB-220U

## (undated) DEVICE — KIT ENDO TURNOVER/PROCEDURE W/CLEAN A SCOPE LINERS 103888

## (undated) DEVICE — ENDO FORCEP ENDOJAW BIOPSY 2.8MMX160CM FB-220K

## (undated) DEVICE — ENDO BITE BLOCK ADULT OLYMPUS LATEX FREE MAJ-1632

## (undated) RX ORDER — FENTANYL CITRATE 50 UG/ML
INJECTION, SOLUTION INTRAMUSCULAR; INTRAVENOUS
Status: DISPENSED
Start: 2020-10-16

## (undated) RX ORDER — FENTANYL CITRATE 50 UG/ML
INJECTION, SOLUTION INTRAMUSCULAR; INTRAVENOUS
Status: DISPENSED
Start: 2023-07-21